# Patient Record
Sex: FEMALE | URBAN - METROPOLITAN AREA
[De-identification: names, ages, dates, MRNs, and addresses within clinical notes are randomized per-mention and may not be internally consistent; named-entity substitution may affect disease eponyms.]

---

## 2023-03-10 ENCOUNTER — TELEPHONE (OUTPATIENT)
Dept: ENDOCRINOLOGY CLINIC | Facility: CLINIC | Age: 33
End: 2023-03-10

## 2023-03-10 NOTE — TELEPHONE ENCOUNTER
Called pt to request the last two office visit notes from previous endocrinology office. Pt stated was seeing a Dr. Dayna Gould in [place] and has not been seen since November of 2022. Gave pt our fax number to have records sent to. Will await records and give to APRN for review.

## 2023-03-12 ENCOUNTER — PATIENT MESSAGE (OUTPATIENT)
Facility: CLINIC | Age: 33
End: 2023-03-12

## 2023-03-13 ENCOUNTER — OFFICE VISIT (OUTPATIENT)
Facility: CLINIC | Age: 33
End: 2023-03-13
Payer: COMMERCIAL

## 2023-03-13 VITALS — DIASTOLIC BLOOD PRESSURE: 64 MMHG | SYSTOLIC BLOOD PRESSURE: 110 MMHG | OXYGEN SATURATION: 99 % | HEART RATE: 96 BPM

## 2023-03-13 DIAGNOSIS — E10.9 TYPE 1 DIABETES MELLITUS WITHOUT COMPLICATION (HCC): Primary | ICD-10-CM

## 2023-03-13 PROBLEM — Z96.41 INSULIN PUMP IN PLACE: Status: ACTIVE | Noted: 2017-02-23

## 2023-03-13 LAB
CARTRIDGE LOT#: ABNORMAL NUMERIC
HEMOGLOBIN A1C: 6.4 % (ref 4.3–5.6)

## 2023-03-13 RX ORDER — INSULIN LISPRO 100 [IU]/ML
40 INJECTION, SOLUTION INTRAVENOUS; SUBCUTANEOUS DAILY
Qty: 40 ML | Refills: 2 | Status: SHIPPED | OUTPATIENT
Start: 2023-03-13

## 2023-03-13 RX ORDER — CIPROFLOXACIN HYDROCHLORIDE 3.5 MG/ML
2 SOLUTION/ DROPS TOPICAL 2 TIMES DAILY
COMMUNITY
End: 2023-03-13

## 2023-03-13 RX ORDER — BLOOD-GLUCOSE SENSOR
1 EACH MISCELLANEOUS
Qty: 9 EACH | Refills: 2 | Status: SHIPPED | OUTPATIENT
Start: 2023-03-13

## 2023-03-13 RX ORDER — BLOOD-GLUCOSE TRANSMITTER
1 EACH MISCELLANEOUS
Qty: 1 EACH | Refills: 3 | Status: SHIPPED | OUTPATIENT
Start: 2023-03-13

## 2023-03-13 RX ORDER — LANCETS
1 EACH MISCELLANEOUS
COMMUNITY

## 2023-03-13 RX ORDER — DESOGESTREL AND ETHINYL ESTRADIOL 21-5 (28)
1 KIT ORAL DAILY
COMMUNITY
End: 2023-03-13

## 2023-03-13 RX ORDER — BLOOD-GLUCOSE SENSOR
1 EACH MISCELLANEOUS
COMMUNITY
End: 2023-03-13

## 2023-03-13 RX ORDER — BLOOD-GLUCOSE TRANSMITTER
1 EACH MISCELLANEOUS
COMMUNITY
End: 2023-03-13

## 2023-03-13 RX ORDER — NORGESTIMATE AND ETHINYL ESTRADIOL 0.25-0.035
1 KIT ORAL DAILY
COMMUNITY
End: 2023-03-13

## 2023-03-13 RX ORDER — PERPHENAZINE 16 MG/1
1 TABLET, FILM COATED ORAL 4 TIMES DAILY
COMMUNITY

## 2023-03-13 RX ORDER — INSULIN LISPRO 100 [IU]/ML
50 INJECTION, SOLUTION INTRAVENOUS; SUBCUTANEOUS DAILY
COMMUNITY
End: 2023-03-13

## 2023-03-13 RX ORDER — INSULIN GLARGINE 100 [IU]/ML
20 INJECTION, SOLUTION SUBCUTANEOUS NIGHTLY
Qty: 15 ML | Refills: 0 | Status: SHIPPED | OUTPATIENT
Start: 2023-03-13

## 2023-03-23 ENCOUNTER — TELEPHONE (OUTPATIENT)
Facility: CLINIC | Age: 33
End: 2023-03-23

## 2023-03-23 NOTE — TELEPHONE ENCOUNTER
Received a call from [employer] from [employer]. Pts Tslim pump is out of warranty and pt due for a new replacement pump. [employer] will fax us the order form to complete.

## 2023-03-23 NOTE — TELEPHONE ENCOUNTER
Form received from [employer] statement of medical necessity. Form completed and signed by Ciaran Barriga. Faxed to [phone]. Fax confirmation received.

## 2023-03-27 NOTE — TELEPHONE ENCOUNTER
Call placed to [facility] [place] at [phone]. All documents have been received for pump replacement.  Statement of Medical Necessity sent to scan

## 2023-05-11 DIAGNOSIS — E10.9 TYPE 1 DIABETES MELLITUS WITHOUT COMPLICATION (HCC): ICD-10-CM

## 2023-05-11 RX ORDER — BLOOD SUGAR DIAGNOSTIC
1 STRIP MISCELLANEOUS 4 TIMES DAILY
COMMUNITY

## 2023-05-11 RX ORDER — LANCETS 33 GAUGE
1 EACH MISCELLANEOUS AS DIRECTED
COMMUNITY

## 2023-05-11 NOTE — TELEPHONE ENCOUNTER
Refills have been requested for the following medications, please advise and review:     Rx: INSULIN GLARGINE (LANTUS SOLOSTAR) 100 UNIT/ML SUBCUTANEOUS SOLUTION PEN-INJECTOR  Sig: Inject 20 units into the skin nightly.   Route: Subcutaneous  Quantity: 15 mL  Refills: 0     Preferred pharmacy: [facility] ([facility] - Karis Alcantara    Last Rx Refill: 3/13/2023  LOV: 3/13/2023  Next Scheduled Appt: 6/16/2023        Per LOV notes:   (E10.9) Type 1 diabetes mellitus without complication ([facility] [address]  (primary encounter diagnosis)  Plan: LIPID PANEL, MICROALB/CREAT RATIO, RANDOM         URINE, TSH W REFLEX TO FREE T4, COMP METABOLIC         PANEL (14), CBC, PLATELET; NO DIFFERENTIAL,         insulin glargine (LANTUS SOLOSTAR) 100 UNIT/ML         Subcutaneous Solution Pen-injector, Insulin         Lispro (HUMALOG) 100 UNIT/ML Injection         Solution, Continuous Blood Gluc Transmit         (DEXCOM G6 TRANSMITTER) Does not apply Misc,         Continuous Blood Gluc Sensor (DEXCOM G6 SENSOR)        Does not apply Misc, HEMOGLOBIN A1C, IRON AND         TIBC

## 2023-05-11 NOTE — TELEPHONE ENCOUNTER
Please call patient to verify this refill is needed- this is for Lantus (long acting insulin) and was intended as pump back up plan    1. Let patient know we received a request for Lantus refill  2. Ask if she has been using Lantus pens or using her pump  3. If she is using Lantus, how many units is she using? 4.  If not using and she is not aware, might have been autogenerated by the pharmacy and we can close it if she does not need it

## 2023-05-12 RX ORDER — INSULIN GLARGINE 100 [IU]/ML
INJECTION, SOLUTION SUBCUTANEOUS
Qty: 15 ML | Refills: 3 | OUTPATIENT
Start: 2023-05-12

## 2023-05-12 NOTE — TELEPHONE ENCOUNTER
RN called patient to ask if she needs a refill of Lantus pens. Patient stated she is currently on a pump and does not use the pens on a regular basis-only if having problems with pump. Patient said she has extra pens on hand and in her refrigerator, if that should occur. Refill request rejected. Routed to [facility]    Will close encounter.

## 2023-05-23 ENCOUNTER — LAB ENCOUNTER (OUTPATIENT)
Dept: LAB | Age: 33
End: 2023-05-23
Payer: COMMERCIAL

## 2023-05-23 DIAGNOSIS — E10.9 TYPE 1 DIABETES MELLITUS WITHOUT COMPLICATION (HCC): ICD-10-CM

## 2023-05-23 LAB
ALBUMIN SERPL-MCNC: 4 G/DL (ref 3.4–5)
ALBUMIN/GLOB SERPL: 1.1 {RATIO} (ref 1–2)
ALP LIVER SERPL-CCNC: 47 U/L
ALT SERPL-CCNC: 12 U/L
ANION GAP SERPL CALC-SCNC: 4 MMOL/L (ref 0–18)
AST SERPL-CCNC: 17 U/L (ref 15–37)
BILIRUB SERPL-MCNC: 0.4 MG/DL (ref 0.1–2)
BUN BLD-MCNC: 15 MG/DL (ref 7–18)
CALCIUM BLD-MCNC: 8.9 MG/DL (ref 8.5–10.1)
CHLORIDE SERPL-SCNC: 108 MMOL/L (ref 98–112)
CHOLEST SERPL-MCNC: 152 MG/DL (ref ?–200)
CO2 SERPL-SCNC: 25 MMOL/L (ref 21–32)
CREAT BLD-MCNC: 0.73 MG/DL
CREAT UR-SCNC: 65.5 MG/DL
ERYTHROCYTE [DISTWIDTH] IN BLOOD BY AUTOMATED COUNT: 15.6 %
FASTING PATIENT LIPID ANSWER: YES
FASTING STATUS PATIENT QL REPORTED: YES
GFR SERPLBLD BASED ON 1.73 SQ M-ARVRAT: 111 ML/MIN/1.73M2 (ref 60–?)
GLOBULIN PLAS-MCNC: 3.7 G/DL (ref 2.8–4.4)
GLUCOSE BLD-MCNC: 166 MG/DL (ref 70–99)
HCT VFR BLD AUTO: 35.3 %
HDLC SERPL-MCNC: 68 MG/DL (ref 40–59)
HGB BLD-MCNC: 10.8 G/DL
IRON SATN MFR SERPL: 10 %
IRON SERPL-MCNC: 36 UG/DL
LDLC SERPL CALC-MCNC: 74 MG/DL (ref ?–100)
MCH RBC QN AUTO: 22.5 PG (ref 26–34)
MCHC RBC AUTO-ENTMCNC: 30.6 G/DL (ref 31–37)
MCV RBC AUTO: 73.4 FL
MICROALBUMIN UR-MCNC: 1.09 MG/DL
MICROALBUMIN/CREAT 24H UR-RTO: 16.6 UG/MG (ref ?–30)
NONHDLC SERPL-MCNC: 84 MG/DL (ref ?–130)
OSMOLALITY SERPL CALC.SUM OF ELEC: 289 MOSM/KG (ref 275–295)
PLATELET # BLD AUTO: 247 10(3)UL (ref 150–450)
POTASSIUM SERPL-SCNC: 4.1 MMOL/L (ref 3.5–5.1)
PROT SERPL-MCNC: 7.7 G/DL (ref 6.4–8.2)
RBC # BLD AUTO: 4.81 X10(6)UL
SODIUM SERPL-SCNC: 137 MMOL/L (ref 136–145)
TIBC SERPL-MCNC: 371 UG/DL (ref 240–450)
TRANSFERRIN SERPL-MCNC: 249 MG/DL (ref 200–360)
TRIGL SERPL-MCNC: 43 MG/DL (ref 30–149)
TSI SER-ACNC: 3.06 MIU/ML (ref 0.36–3.74)
VLDLC SERPL CALC-MCNC: 7 MG/DL (ref 0–30)
WBC # BLD AUTO: 5.3 X10(3) UL (ref 4–11)

## 2023-05-23 PROCEDURE — 80061 LIPID PANEL: CPT

## 2023-05-23 PROCEDURE — 84443 ASSAY THYROID STIM HORMONE: CPT

## 2023-05-23 PROCEDURE — 82570 ASSAY OF URINE CREATININE: CPT

## 2023-05-23 PROCEDURE — 36415 COLL VENOUS BLD VENIPUNCTURE: CPT

## 2023-05-23 PROCEDURE — 85027 COMPLETE CBC AUTOMATED: CPT

## 2023-05-23 PROCEDURE — 83550 IRON BINDING TEST: CPT

## 2023-05-23 PROCEDURE — 3061F NEG MICROALBUMINURIA REV: CPT

## 2023-05-23 PROCEDURE — 83540 ASSAY OF IRON: CPT

## 2023-05-23 PROCEDURE — 80053 COMPREHEN METABOLIC PANEL: CPT

## 2023-05-23 PROCEDURE — 82043 UR ALBUMIN QUANTITATIVE: CPT

## 2023-05-24 ENCOUNTER — TELEPHONE (OUTPATIENT)
Facility: CLINIC | Age: 33
End: 2023-05-24

## 2023-05-24 NOTE — TELEPHONE ENCOUNTER
Called pt to go over lab results. Per Jacinta BRAGG: \"Please call pt with results:   -CBC shows anemia which appears to be iron deficiency anemia, she mentioned this previously to me during visit so I checked since was getting other annual labs. Would recommend follow up with primary care regarding this, typically we don't manage   -Urine test was checking for microalbuminuria, which isprotein spillage in the urine, can happen in patients with diabetes when their blood sugar or blood pressure is uncontrolled. Her test was NEGATIVE for protein spillage, which is a good result. We will check this annually to screen for kidney damage.   -Cholesterol panel looks good   -TFTs are normal, check annually \"  Provided pt with Jacinta notes and pt verbalized understanding and had no questions for staff at this time.

## 2023-06-16 ENCOUNTER — OFFICE VISIT (OUTPATIENT)
Facility: CLINIC | Age: 33
End: 2023-06-16
Payer: COMMERCIAL

## 2023-06-16 VITALS
DIASTOLIC BLOOD PRESSURE: 66 MMHG | OXYGEN SATURATION: 100 % | HEART RATE: 85 BPM | WEIGHT: 128 LBS | SYSTOLIC BLOOD PRESSURE: 116 MMHG

## 2023-06-16 DIAGNOSIS — D50.9 IRON DEFICIENCY ANEMIA, UNSPECIFIED IRON DEFICIENCY ANEMIA TYPE: ICD-10-CM

## 2023-06-16 DIAGNOSIS — E10.9 TYPE 1 DIABETES MELLITUS WITHOUT COMPLICATION (HCC): Primary | ICD-10-CM

## 2023-06-16 LAB
CARTRIDGE LOT#: ABNORMAL NUMERIC
HEMOGLOBIN A1C: 6.4 % (ref 4.3–5.6)

## 2023-06-16 PROCEDURE — 95251 CONT GLUC MNTR ANALYSIS I&R: CPT

## 2023-06-16 PROCEDURE — 3074F SYST BP LT 130 MM HG: CPT

## 2023-06-16 PROCEDURE — 99215 OFFICE O/P EST HI 40 MIN: CPT

## 2023-06-16 PROCEDURE — 3044F HG A1C LEVEL LT 7.0%: CPT

## 2023-06-16 PROCEDURE — 83036 HEMOGLOBIN GLYCOSYLATED A1C: CPT

## 2023-06-16 PROCEDURE — 3078F DIAST BP <80 MM HG: CPT

## 2023-06-16 RX ORDER — BUSPIRONE HYDROCHLORIDE 5 MG/1
10 TABLET ORAL DAILY
COMMUNITY
Start: 2023-05-12

## 2023-06-16 NOTE — PATIENT INSTRUCTIONS
Eye exam: Please have your diabetic eye exam if you have not had one this year. This exam is critical to preventing and treating eye conditions caused by diabetes that could potentially lead to vision loss. Once you get your eye exam done, or if you've already had one done this year, please call your eye care provider and ask them to fax your latest report to our office. This will help us update our records. Fax number: [phone]     Plan:  - Follow up with primary care/OB regarding iron deficiency anemia  - minor pump setting adjustments made today, if noting still running a bit higher after meals I'd move your carb ratio from 15 --> 14 and see if that helps.  You can also make some more minor adjustments to your sensitivity- if 68 is still not enough, move to 65, that may be your sweet spot  - let's see you back in 3 months to make sure changes all look good :)       Pump back up plan:  Lantus 20u daily  Humalog carb ratio: take 1u for every 15 grams of carbs,   Sensitivity: take 1 u for every 68 points over 100

## 2023-09-15 ENCOUNTER — OFFICE VISIT (OUTPATIENT)
Facility: CLINIC | Age: 33
End: 2023-09-15
Payer: COMMERCIAL

## 2023-09-15 VITALS — DIASTOLIC BLOOD PRESSURE: 60 MMHG | SYSTOLIC BLOOD PRESSURE: 110 MMHG | OXYGEN SATURATION: 100 % | HEART RATE: 93 BPM

## 2023-09-15 DIAGNOSIS — E10.9 TYPE 1 DIABETES MELLITUS WITHOUT COMPLICATION (HCC): Primary | ICD-10-CM

## 2023-09-15 PROBLEM — E10.319 DIABETIC RETINOPATHY ASSOCIATED WITH TYPE 1 DIABETES MELLITUS (HCC): Status: ACTIVE | Noted: 2023-09-15

## 2023-09-15 LAB
CARTRIDGE LOT#: ABNORMAL NUMERIC
HEMOGLOBIN A1C: 6.2 % (ref 4.3–5.6)

## 2023-09-15 PROCEDURE — 3044F HG A1C LEVEL LT 7.0%: CPT

## 2023-09-15 PROCEDURE — 95251 CONT GLUC MNTR ANALYSIS I&R: CPT

## 2023-09-15 PROCEDURE — 3074F SYST BP LT 130 MM HG: CPT

## 2023-09-15 PROCEDURE — 83036 HEMOGLOBIN GLYCOSYLATED A1C: CPT

## 2023-09-15 PROCEDURE — 99214 OFFICE O/P EST MOD 30 MIN: CPT

## 2023-09-15 PROCEDURE — 3078F DIAST BP <80 MM HG: CPT

## 2023-09-15 RX ORDER — INSULIN GLARGINE 100 [IU]/ML
17 INJECTION, SOLUTION SUBCUTANEOUS NIGHTLY
COMMUNITY
Start: 2023-09-15

## 2023-09-15 RX ORDER — PEN NEEDLE, DIABETIC 32GX 5/32"
1 NEEDLE, DISPOSABLE MISCELLANEOUS DAILY
COMMUNITY
Start: 2023-09-15

## 2023-10-22 DIAGNOSIS — E10.9 TYPE 1 DIABETES MELLITUS WITHOUT COMPLICATION (HCC): ICD-10-CM

## 2023-10-23 RX ORDER — PROCHLORPERAZINE 25 MG/1
1 SUPPOSITORY RECTAL
Qty: 9 EACH | Refills: 1 | Status: SHIPPED | OUTPATIENT
Start: 2023-10-23

## 2023-10-23 NOTE — TELEPHONE ENCOUNTER
LOV: 9/15/23    RTC: 3 months - around 12/15/23    FU: scheduled 12/15/23    Last Refill: 3/13/23    Month Supply Pending: 3 months

## 2023-11-09 DIAGNOSIS — E10.9 TYPE 1 DIABETES MELLITUS WITHOUT COMPLICATION (HCC): ICD-10-CM

## 2023-11-09 RX ORDER — INSULIN LISPRO 100 [IU]/ML
INJECTION, SOLUTION INTRAVENOUS; SUBCUTANEOUS
Qty: 60 ML | Refills: 1 | Status: SHIPPED | OUTPATIENT
Start: 2023-11-09

## 2023-11-09 NOTE — TELEPHONE ENCOUNTER
LOV: 9/15/23    RTC: 3 months (around 12/15/23)    FU: scheduled 12/15/23    Last Refill: 3/13/23    Month Supply Pending: 3 months     Per OV notes from 9/15/23:    \"Tandem TSlim pump- Basal IQ- Using Humalog U100 (fills with ~120u q3day)  MN: 0.60 u/hr   630a:  0.90 u/hr   8p: 0.90 u/hr   Calculated Total daily basal: 19.70u  ICR:  14 --> 15  ISF:  68 --> 65  Target \"

## 2023-11-28 LAB
ANTIBODY SCREEN OB: NEGATIVE
HEPATITIS B SURFACE ANTIGEN OB: NEGATIVE
HIV RESULT OB: NEGATIVE
RAPID PLASMA REAGIN OB: NONREACTIVE

## 2023-11-29 ENCOUNTER — OFFICE VISIT (OUTPATIENT)
Facility: CLINIC | Age: 33
End: 2023-11-29
Payer: COMMERCIAL

## 2023-11-29 VITALS — OXYGEN SATURATION: 100 % | HEART RATE: 104 BPM | DIASTOLIC BLOOD PRESSURE: 60 MMHG | SYSTOLIC BLOOD PRESSURE: 92 MMHG

## 2023-11-29 DIAGNOSIS — O24.011 TYPE 1 DIABETES MELLITUS DURING PREGNANCY IN FIRST TRIMESTER: Primary | ICD-10-CM

## 2023-11-29 PROCEDURE — 99214 OFFICE O/P EST MOD 30 MIN: CPT

## 2023-11-29 PROCEDURE — 3074F SYST BP LT 130 MM HG: CPT

## 2023-11-29 PROCEDURE — 3078F DIAST BP <80 MM HG: CPT

## 2023-11-29 PROCEDURE — 95251 CONT GLUC MNTR ANALYSIS I&R: CPT

## 2023-11-29 RX ORDER — GLUCAGON INJECTION, SOLUTION 1 MG/.2ML
1 INJECTION, SOLUTION SUBCUTANEOUS AS NEEDED
Qty: 0.4 ML | Refills: 1 | Status: SHIPPED | OUTPATIENT
Start: 2023-11-29

## 2023-11-29 RX ORDER — PERPHENAZINE 16 MG/1
1 TABLET, FILM COATED ORAL 4 TIMES DAILY
Qty: 200 EACH | Refills: 1 | Status: SHIPPED | OUTPATIENT
Start: 2023-11-29

## 2023-11-29 NOTE — PROGRESS NOTES
Endocrinology Clinic Note    Name: June Grossman  Date: 11/29/2023      HISTORY OF PRESENT ILLNESS   June Grossman is a 26year old female with PMHx significant for type 1 diabetes mellitus who presents for T1DM management. Initial HPI consult in March 2023:   A1C 6.4%  Diagnosed age: 7y/o  Pt denies hx of hospitalization for DM and/or pancreatitis. Family history of DM: None  Only has had DKA 1x possibly- unsure  Establishing care to be closer to home, prev pt of Dr Sanchez Rebolledo in [facility]  Using Tandem pump in [place], is due for pump upgrade but needed updated visit note    Interim hx  June 2023- A1C 6.4% today. Upgraded to control IQ pump in April 2023, liking it so far! Did have to change her settings from [address], we changed from 100-->55 and was constantly snacking w/ this so incr'd back up to middle ground of 75. She does note that once she hits >200 her insulin resistance seems worse, otherwise very insulin sensitive. No pattern to hypo events, occurring very rarely. Sept 2023- Last A1c value was 6.2% done 9/15/2023. Current DM meds: Tandem pump settings (below)   Doing well overall, liking the pump changes we made. Is often manually entering carbs instead of using carb ratio; very busy w/ work and kids  Starting taking iron supplements, has more energy now.  Also recently dx'd with ADHD, tried anxiety meds but wasn't a good fit    Nov 2023-  Newly pregnant, LMP 10/4/23, approx 8 wks pregnant, saw OB yesterday ([address] [place] [address])  Has noticed higher BG readings the past few weeks, using more insulin  Previous pregnancy used Control IQ, prior to that was on medtronic pump      Tandem Tslim with Control IQ - Humalog U100 insulin and Dexcom G6  Average daily insulin summary:  TDD 39.36  Basal: 46% - 18.24u/day  Bolus: 54% - 21.13u/day  Basal setting:  MN: 0.60 u/hr  6:30a 0.90 u/hr  8p: 0.90 u/hr  Bolus setting:   ICR: 15  ISF: 65  Active insulin time: 5h, target 110      Continuous Glucose Monitoring Interpretation    Ronan Blanco has undergone continuous glucose monitoring with the Dexcom CGM. The blood glucose tracings were evaluated for two weeks prior to office visit. Blood glucose tracings demonstrated areas of hyperglycemia from 11a to 4p. There were no areas of hypoglycemia during the weeks of evaluation. At goal () 77% of the time. High 20% of the time. Very high 3% of the time. Low 0% of the time. Very low 0% of the time. Previously trialed/failed: prev on medtronic pump    REVIEW OF SYSTEMS  Ten point review of systems has been performed and is otherwise negative and/or non-contributory, except as described above. Medications:     Current Outpatient Medications:     Glucose Blood (CONTOUR NEXT TEST) In Vitro Strip, 1 each by Other route in the morning, at noon, in the evening, and at bedtime. , Disp: 200 each, Rfl: 1    glucagon (GVOKE HYPOPEN 2-PACK) 1 MG/0.2ML Subcutaneous SUBQ injection, Inject 0.2 mL (1 mg total) into the skin as needed. Patient trained on [address] No substitutions, Disp: 0.4 mL, Rfl: 1    insulin lispro (HUMALOG) 100 UNIT/ML Injection Solution, Used to fill insulin pump with max 60 u daily. 3-month supply, Disp: 60 mL, Rfl: 1    Continuous Blood Gluc Sensor (DEXCOM G6 SENSOR) Does not apply Misc, CHANGE 1 SENSOR EVERY 10 DAYS., Disp: 9 each, Rfl: 1    Insulin Pen Needle (BD PEN NEEDLE CARMEN U/F) 32G X 4 MM Does not apply Misc, Inject 1 Pen into the skin daily. Use as insulin pump back up, Disp: , Rfl:     insulin glargine (LANTUS SOLOSTAR) 100 UNIT/ML Subcutaneous Solution Pen-injector, Inject 17 Units into the skin nightly., Disp: , Rfl:     busPIRone 5 MG Oral Tab, Take 2 tablets (10 mg total) by mouth daily. , Disp: , Rfl:     OneTouch Delica Lancets 11Y Does not apply Misc, 1 Lancet by Finger stick route As Directed., Disp: , Rfl:     Glucose Blood (ONETOUCH VERIO) In Vitro Strip, 1 each by Other route 4 (four) times daily. , Disp: , Rfl:     Microlet Lancets Does not apply Misc, 1 each by Other route TID & HS., Disp: , Rfl:     Continuous Blood Gluc Transmit (DEXCOM G6 TRANSMITTER) Does not apply Misc, 1 each by Other route every 3 (three) months., Disp: 1 each, Rfl: 3     Allergies:   No Known Allergies    Social History:   Social History     Socioeconomic History    Marital status:    Tobacco Use    Smoking status: Never    Smokeless tobacco: Never   Substance and Sexual Activity    Alcohol use: Yes     Alcohol/week: 1.0 standard drink of alcohol     Types: 1 Glasses of wine per week     Comment: 1 per week occasionally    Drug use: Never    Sexual activity: Yes       Medical History:   Past Medical History:   Diagnosis Date    Insulin pump in place     Type 1 diabetes mellitus ([facility] [address]        Surgical history:   Past Surgical History:   Procedure Laterality Date      SECTION LEVEL I  2020      SECTION LEVEL I  2017    WISDOM TEETH REMOVED           PHYSICAL EXAM  Vitals:    23 0912   BP: 92/60   Pulse: 104   SpO2: 100%       General Appearance:  alert, well developed, in no acute distress  Eyes:  normal conjunctivae, sclera  Musculoskeletal:  normal muscle strength and tone  Skin:  normal moisture and skin texture  Hair & Nails:  normal scalp hair     Neuro:  sensory grossly intact and motor grossly intact  Psychiatric:  oriented to time, self, and place      ASSESSMENT/PLAN:    (E10.9) Type 1 diabetes mellitus without complication ([facility] [address]  (primary encounter diagnosis)    Pre pregnancy A1C 6.2%,to goal <6.5%. We discussed options of remaining in control IQ vs manual mode on pump. Control IQ will keep pt closer to 110 vs manual closer to guidelines recommended FBG <95. Pt prefers to trial incr'd settings on Control IQ to eval if BG improve as she is quite sensitive to low Bgs, fearful of hypo events overnight, which control IQ mitigates.  Will trial Control IQ with adjusted settings and she is aware of risks associated with higher BG readings above target. If not meeting goals, will plan to trial manual mode settings     We discussed the importance of glycemic control to prevent complications of diabetes. We discussed the importance of SBGM and consistent, low carb diet as well as moderate exercise as well. We also discussed the complications of diabetes include retinopathy, neuropathy, nephropathy and cardiovascular disease. Tandem TSlim pump- Basal IQ- Using Humalog U100 (fills with ~130u q3day)    Basal setting:  MN: 0.60 u/hr --> 0.80 u/hr  Bolus setting:   ICR: 15 --> 12  ISF: 65 --> 55  Active insulin time: 5h, target 110    - got labs done with OB office, if did not complete TFTs to let office know and I will order  - Goal is to get as close to 6% as possible without causing hypoglycemia. Fasting blood sugar goal <95. 1 hour after meal <140. 2 hours after meal <120. A1C goal 6-6.5%  - plan for every Monday BG check ins while pregnant  - gvoke sent (11/2023)  - pump backup plan: Lantus 17u nightly, Humalog ICR 1:15, ISF 1:65>100 (updated 9/2023)  - Ophtho: no DR, No data recorded Eye Exam shows Diabetic Retinopathy?: No  - BP controlled, on not on antihypertensives  - LDL at goal, defer statin, pregnant  - MAB neg  - Neuropathy/ Foot exam: Last Foot Exam: 03/13/23  - CAD: none       Return in about 1 month (around 12/29/2023) for evaluation of insulin adjustments, follow up on pump settings.       11/29/2023  YEMI Head

## 2023-12-04 ENCOUNTER — TELEPHONE (OUTPATIENT)
Facility: CLINIC | Age: 33
End: 2023-12-04

## 2023-12-04 NOTE — TELEPHONE ENCOUNTER
Printed pts Tandem report for weekly review. Placed in APN in basket.      Per LOV 11/29:  Tandem TSlim pump- Basal IQ- Using Humalog U100 (fills with ~130u q3day)     Basal setting:  MN: 0.60 u/hr --> 0.80 u/hr  Bolus setting:   ICR: 15 --> 12  ISF: 65 --> 55  Active insulin time: 5h, target 110

## 2023-12-04 NOTE — TELEPHONE ENCOUNTER
Patient self adjusted basal rates to:    MN --> 0.8  8 pm --> 1 u (added yesterday)    Patient was noticing high BG's in the evening since stopping control IQ    Patient adjusted ICR + ISF, will check with provider about basal rates    Nidhi Denny RN   DCES

## 2023-12-04 NOTE — TELEPHONE ENCOUNTER
Monday pregnancy check in- 12/4/2023  Unknown today. Patient currently taking:  Basal setting:  MN: 0.80 u/hr  Bolus setting:   ICR: 12  ISF: 55  Active insulin time: 5h, target 110    CGM and Tandem T-slim data reviewed. Provider assessment:  no post prandial hyperglycemia noted. Having lows after meal boluses when she enters carbs, but still is overriding quite a bit  Overnight/basal pattern stable. Occasional near hypo. Did not adjust settings per message last week  There were no areas of hypoglycemia during the weeks of evaluation. and There was notable hypoglycemia over the last week. Plan:  Make the following med changes:    Basal setting:  MN: 0.60 u/hr   0530: 0.65 u/hr  8A: 0.7 u/hr  10P (or one hour before bedtime): 0.60 u/hr  Bolus setting:   ICR: 12 --> 14  ISF: 55---> 56  Active insulin time: 5h, target 110       Routed to DM educator to communicate plan to patient.

## 2023-12-04 NOTE — TELEPHONE ENCOUNTER
Leander Ya to communicate to patient:  Let's try this for basal rates:  MN: 0.70u/hr  7a: 0.80 u/hr  7P: 0.90u/hr  10P: 0.70u/hr    ICR 12-->13  ISF 56    Try to be more consistent with entering carbs using carb bolus feature as this will really help prevent these labile readings and help us titrate as needed    Will get on Nova's schedule for carb count refresh

## 2023-12-05 ENCOUNTER — MED REC SCAN ONLY (OUTPATIENT)
Facility: CLINIC | Age: 33
End: 2023-12-05

## 2023-12-11 ENCOUNTER — TELEPHONE (OUTPATIENT)
Facility: CLINIC | Age: 33
End: 2023-12-11

## 2023-12-11 NOTE — TELEPHONE ENCOUNTER
Printed patients tandem report for weekly review. Placed in APN inbasket.      Per last weekly check in 12/4:   Plan:  Make the following med changes:     Basal setting:  MN: 0.60 u/hr   0530: 0.65 u/hr  8A: 0.7 u/hr  10P (or one hour before bedtime): 0.60 u/hr  Bolus setting:   ICR: 12 --> 14  ISF: 55---> 56  Active insulin time: 5h, target 110

## 2023-12-11 NOTE — TELEPHONE ENCOUNTER
Monday pregnancy check in- 12/11/2023    Patient currently taking:  Basal setting:  MN: 0.60 u/hr   0630: 0.80 u/hr  1P: 1.20 u/hr  3P: 0.80  6P: 1.20  10P: 0.80 u/hr  Bolus setting:   ICR: 14  ISF: 56  TDD 40.93u    Dexcom G6 CGM Tandem T-slim data reviewed. Provider assessment:  mild post prandial hyperglycemia noted. Overnight/basal pattern stable. FBG not to goal  There were several areas of hypoglycemia. These occurred after pump override doses    Plan:  Make the following med changes:  Basal setting:  MN: 0.60 u/hr --> 0.70  0630: 0.80 u/hr --> 1.0 u/hr  1P: 1.20 u/hr--> 1.0 u/hr  3P: 0.80--> 1.0 u/hr  6P: 1.20--> 1.0 u/hr (let's try making these profiles the same thruout the day to see if helps with consistency- hard to note patterns)  10P: 0.80 u/hr  Bolus setting:   ICR: 14 --> 13  ISF: 56    Routed to DM educator to communicate plan to patient.

## 2023-12-18 ENCOUNTER — TELEPHONE (OUTPATIENT)
Facility: CLINIC | Age: 33
End: 2023-12-18

## 2023-12-18 NOTE — TELEPHONE ENCOUNTER
Monday pregnancy check in- 12/18/2023 - Unknown today. Patient currently taking:  TDD 36u     MN:  basal 0.55  ICR 1:13  ISF 56  target 110  6:30 am: basal 1.0 u  ICR 1:13  ISF 56  target 110  10 pm: basal 0.55 u   ICR 1:13  ISF 56  target 110     Tandem T-slim and with phone (connected to clinic profile) data reviewed. Provider assessment:  FBG not to goal, 2h PP to goal, and Hypoglycemia present  Having some overnight hypo/hyperglycemia (no consistent pattern); sometimes going low after meal boluses. Is over basalized currently. Plan:  Make the following med changes:    MN:  basal 0.55   ICR 1:13   --> 14; ISF 56  target 110  6:30 am: basal 1.0 u --> 0.95 u/hr  ICR 1:13 --> 14; ISF 56  target 110  10 pm: basal 0.70 --> 0.65 u/hr   ICR 1:13  --> 14 ; ISF 56  target 110   I am decreasing the settings ever so slightly; now if she is running higher, should give corrections; and if she feels the corrections aren't bringing her down,  we can slowly adjust her correction factor down (which is quite conservative right now compared to rest of settings)    Routed to DM educator to communicate plan to patient.

## 2023-12-18 NOTE — TELEPHONE ENCOUNTER
Downloaded pts Tandem report into [employer] for APN review.      Per last weekly check in 12/11:   Update settings to this:     MN:  basal 0.55  ICR 1:13  ISF 56  target 110  6:30 am: basal 1.0 u  ICR 1:13  ISF 56  target 110  10 pm: basal 0.55 u   ICR 1:13  ISF 56  target 110

## 2023-12-26 ENCOUNTER — PATIENT MESSAGE (OUTPATIENT)
Facility: CLINIC | Age: 33
End: 2023-12-26

## 2023-12-26 ENCOUNTER — TELEPHONE (OUTPATIENT)
Facility: CLINIC | Age: 33
End: 2023-12-26

## 2023-12-27 NOTE — TELEPHONE ENCOUNTER
Reviewed Tandem source    I suspected if we reduced basal rate, would need to incr ICR 14 --> 12    Also c/b pt's current illness.  [facility] sent with instructions

## 2023-12-29 NOTE — TELEPHONE ENCOUNTER
RN phoned patient to no answer, LVM asking patient to review [facility] from [address] [place] [address] & [facility] and then let us know how she is doing on 1/2 when we are back in office.

## 2024-01-02 ENCOUNTER — TELEPHONE (OUTPATIENT)
Facility: CLINIC | Age: 34
End: 2024-01-02

## 2024-01-02 NOTE — TELEPHONE ENCOUNTER
LVM for patient to call back when available to discuss further and schedule appointment with DCES.  Follow up MCM sent as well    Rohini BETANCUR

## 2024-01-02 NOTE — TELEPHONE ENCOUNTER
Monday pregnancy check in- 1/2/2024     Tandem T-slim and with phone (connected to clinic profile) data reviewed.     Provider assessment:  There are some evenings where FBG look stable. She is frequently overriding her pump so the calculations/changes we are making aren't considered in calculation. Her 2hPP appears not to goal, but looks like at some point in last few days she adjusted ICR 1:14 --> 10. Would benefit from ISF adjustment however would not see benefit unless she uses ISF calculator consistently. Needs to meet with CDE.    ISF remains 1:56, using calculations could be ~1:50     Plan:  Make the following med changes:  MN:  basal 0.50   ICR 1:10; ISF 56 --> 52  target 110  6:30 am: basal 0.80  ICR 1:10; ISF 56--> 52   target 110  10 pm: basal  0.65 u/hr   ICR 1:10 ; ISF 56 --> 52  target 110     Encourage pt to avoid pump overrides otherwise we won't have clear pictures of insulin dosages  Plan to meet with DM educator just to review bolus habits    Routed to DM educator to communicate plan to patient.

## 2024-01-02 NOTE — TELEPHONE ENCOUNTER
Uploaded pts [employer]connect report into [email or web address] for weekly check in. Routing to APN for review.     Per last weekly check in 12/26:     Plan:  Make the following med changes:  Suspect some over-basalization during the day. Phoned pt to discuss. She had to cancel her appt tomorrow because she's sick. Will adjust basal rates; MCM sent. If still high later this week, then can adjust ISF, advised to try avoiding overriding the pump using manual boluses as this seems to contribute to low BG as well        MN:  basal 0.50   ICR 1:14; ISF 56  target 110  6:30 am: basal 0.95 --> 0.70 u/hr  ICR 1:14; ISF 56  target 110  10 pm: basal  0.65 u/hr   ICR 1:14 ; ISF 56  target 110

## 2024-01-08 ENCOUNTER — TELEPHONE (OUTPATIENT)
Facility: CLINIC | Age: 34
End: 2024-01-08

## 2024-01-08 NOTE — TELEPHONE ENCOUNTER
Uploaded pt Tandem report into [employer] for weekly review. Routing to APN.     Per last weekly check in 1/2:  Plan:  Make the following med changes:  MN:  basal 0.50   ICR 1:10; ISF 56 --> 52  target 110  6:30 am: basal 0.80  ICR 1:10; ISF 56--> 52   target 110  10 pm: basal  0.65 u/hr   ICR 1:10 ; ISF 56 --> 52  target 110      Encourage pt to avoid pump overrides otherwise we won't have clear pictures of insulin dosages  Plan to meet with DM educator just to review bolus habits

## 2024-01-08 NOTE — TELEPHONE ENCOUNTER
LVM and sent MCM with the following update per APN:     MN:  basal 0.50   ICR 1:10; ISF  52 --> 48  target 110  6:30 am: basal 0.80  ICR 1:10; ISF  52 --> 48   target 110  10 pm: basal  0.65 u/hr   ICR 1:10 ; ISF  52 --> 48  target 110   Patient reports that he used to smoke 1 5 PPD  Has now cut back to smoking 3 cigarettes a day  Plan:  Continue with nicotine patch  Continue encouraging smoking cessation

## 2024-01-08 NOTE — TELEPHONE ENCOUNTER
Monday pregnancy check in- 1/8/2024 - Unknown today.     Patient currently taking:  MN:  basal 0.50   ICR 1:10; ISF  52  target 110  6:30 am: basal 0.80  ICR 1:10; ISF  52   target 110  10 pm: basal  0.65 u/hr   ICR 1:10 ; ISF  52  target 110    Tandem T-slim and with phone (connected to clinic profile) data reviewed.     Provider assessment:  FBG to goal/stable, 2hPP not to goal, and Hypoglycemia present    Plan:  ISF still more conservative which is why she may feel the need to override the pump after a correction given.  Plan to adjust ISF    Make the following med changes:    MN:  basal 0.50   ICR 1:10; ISF  52 --> 48  target 110  6:30 am: basal 0.80  ICR 1:10; ISF  52 --> 48   target 110  10 pm: basal  0.65 u/hr   ICR 1:10 ; ISF  52 --> 48  target 110    Routed to DM educator to communicate plan to patient.

## 2024-01-15 ENCOUNTER — TELEPHONE (OUTPATIENT)
Facility: CLINIC | Age: 34
End: 2024-01-15

## 2024-01-15 NOTE — TELEPHONE ENCOUNTER
Placed call to patient and gave direction per APN to try and avoid overriding boluses.  Patient v/u and thanks writer for calling.    Rohini BETANCUR   DCES

## 2024-01-15 NOTE — TELEPHONE ENCOUNTER
Uploaded pts Tandem report into [employer] for weekly review. Routing to APN.     Per last weekly check in 1/8:  Plan:  ISF still more conservative which is why she may feel the need to override the pump after a correction given.  Plan to adjust ISF     Make the following med changes:     MN:  basal 0.50   ICR 1:10; ISF  52 --> 48  target 110  6:30 am: basal 0.80  ICR 1:10; ISF  52 --> 48   target 110  10 pm: basal  0.65 u/hr   ICR 1:10 ; ISF  52 --> 48  target 110

## 2024-01-15 NOTE — TELEPHONE ENCOUNTER
Monday pregnancy check in- 1/15/2024 - Unknown today.     Patient currently taking:  MN:  basal 0.50u/hr    ICR 1:10; ISF 48  target 110  6:30 am: basal 0.80u/hr  ICR 1:10; ISF 48   target 110  10 pm: basal  0.65 u/hr   ICR 1:10 ; ISF  48  target 110    Tandem T-slim and with phone (connected to clinic profile) data reviewed.   Avg TDD 35u    Provider assessment:  FBG to goal/stable, 2hPP not to goal, and Hypoglycemia present    Plan:  Overall BG appear more stable this week. Occasional hypo episodes occur after stacked insulin, particularly after pt gives manual corrections  No medication changes today, continue current plan.   Advise pt to continue using pump programmed- recommendations as if she overrides for corrections, my settings adjustments won't matter too much. Gave some screenshots below as examples for pt - non manual boluses bring her down nicely w/o lows      Routed to DM educator to communicate plan to patient.

## 2024-01-22 ENCOUNTER — PATIENT MESSAGE (OUTPATIENT)
Facility: CLINIC | Age: 34
End: 2024-01-22

## 2024-01-22 ENCOUNTER — TELEPHONE (OUTPATIENT)
Facility: CLINIC | Age: 34
End: 2024-01-22

## 2024-01-22 NOTE — TELEPHONE ENCOUNTER
Monday pregnancy check in- 1/22/2024 - Unknown today.     Patient currently taking:  MN:  basal 0.50u/hr    ICR 1:10; ISF 48  target 110  6:30 am: basal 0.80u/hr  ICR 1:10; ISF 48   target 110  10 pm: basal  0.60 u/hr   ICR 1:10 ; ISF  48  target 110      Tandem T-slim and with phone (connected to clinic profile) data reviewed.     Provider assessment:  FBG not to goal, 2hPP not to goal, and No hypoglycemia noted    Plan:  No changes today- pt needs appt to discuss BG control - generally stable  MCM sent to pt requesting to call and schedule appt.    MN:  basal 0.50u/hr    ICR 1:10; ISF 48  target 110  6:30 am: basal 0.80u/hr  ICR 1:10; ISF 48   target 110  10 pm: basal  0.60 u/hr   ICR 1:10 ; ISF  48  target 110

## 2024-01-23 NOTE — TELEPHONE ENCOUNTER
From: Beatriz Aguilar  To: Tristin Gonzalez  Sent: 1/22/2024 11:04 AM CST  Subject: Settings    Hi Tristin--    I reviewed your pump report. Things overall look stable but I see you're often riding low overnight.    I like to see you monthly for visits while pregnant- please schedule an in person or phone visit this week or next so we can check in how it's going and from there I can more more adjustments -- I don't want to keep adjusting without chatting about what's working well/what you feel isn't working.    Office phone number: [phone]    Take care! YEMI Ortiz

## 2024-01-23 NOTE — TELEPHONE ENCOUNTER
MN:  basal 0.50u/hr    ICR 1:10; ISF 48  target 110  6:30 am: basal 0.80u/hr  ICR 1:10; ISF 48   target 110  10 pm: basal  0.60 u/hr   ICR 1:10 ; ISF  48  target 110    Changed ISF 48 --> 55, asked to schedule f/u appt. Closing encounter

## 2024-01-29 ENCOUNTER — TELEPHONE (OUTPATIENT)
Facility: CLINIC | Age: 34
End: 2024-01-29

## 2024-01-29 NOTE — TELEPHONE ENCOUNTER
Monday pregnancy check in- 1/29/2024 - Unknown today.     Patient currently taking:  MN:  basal 0.50u/hr    ICR 1:10; ISF 48  target 110  6:30 am: basal 0.80u/hr  ICR 1:10; ISF 48   target 110  10 pm: basal  0.60 u/hr   ICR 1:10 ; ISF  48  target 110     Changed ISF 48 --> 55, asked to schedule f/u appt. Closing encounter           Tandem T-slim and with phone (connected to clinic profile) data reviewed.     Provider assessment:  FBG not to goal, 2hPP not to goal, and No hypoglycemia noted    Plan:  Make the following med changes:  Please arrange for pt to meet with CDE prior to her appt with me  on 1/31 (if possible) to discuss bolusing strategies and carb intake during pregnancy  Agree w/ the changes she made to ISF/ICR over the weekend. Let's adjust MN basal as she's climbing higher at that time:    MN:  basal 0.50u/hr  --> 0.55u/hr  ICR 1:9; ISF 48  target 110  6:30 am: basal 0.80u/hr  ICR 1:9; ISF 48   target 110  10 pm: basal  0.60 u/hr   ICR 1:9 ; ISF  48  target 110        Routed to DM educator to communicate plan to patient.

## 2024-01-29 NOTE — TELEPHONE ENCOUNTER
Uploaded pt tandem report into [employer]. Routing to provider for review.     Pt sent mcm will the following comments:   Hi Beatriz, Happy Monday!      So, the past 2 days my blood sugars have been out of control. I think I’ve hit that next stage in pregnancy where my insulin resistance is going up. I increased the basal slightly, and also the carb ratio from 10 to 9. This may need to be adjusted further.      I’m Calling today to make an appointment!      Thank you,  Tristin Gonzalez     Per TE 1/22 last pump changes made:   Changed ISF 48 --> 55, asked to schedule f/u appt.

## 2024-01-31 ENCOUNTER — LAB ENCOUNTER (OUTPATIENT)
Dept: LAB | Age: 34
End: 2024-01-31
Payer: COMMERCIAL

## 2024-01-31 ENCOUNTER — OFFICE VISIT (OUTPATIENT)
Facility: CLINIC | Age: 34
End: 2024-01-31
Payer: COMMERCIAL

## 2024-01-31 VITALS — SYSTOLIC BLOOD PRESSURE: 110 MMHG | OXYGEN SATURATION: 98 % | DIASTOLIC BLOOD PRESSURE: 64 MMHG | HEART RATE: 101 BPM

## 2024-01-31 DIAGNOSIS — O24.012 TYPE 1 DIABETES MELLITUS DURING PREGNANCY IN SECOND TRIMESTER: Primary | ICD-10-CM

## 2024-01-31 DIAGNOSIS — O24.012 TYPE 1 DIABETES MELLITUS DURING PREGNANCY IN SECOND TRIMESTER: ICD-10-CM

## 2024-01-31 LAB
EST. AVERAGE GLUCOSE BLD GHB EST-MCNC: 117 MG/DL (ref 68–126)
HBA1C MFR BLD: 5.7 % (ref ?–5.7)
T4 FREE SERPL-MCNC: 1.2 NG/DL (ref 0.8–1.7)
THYROGLOB SERPL-MCNC: <15 U/ML (ref ?–60)
THYROPEROXIDASE AB SERPL-ACNC: 44 U/ML (ref ?–60)
TSI SER-ACNC: 3.05 MIU/ML (ref 0.36–3.74)

## 2024-01-31 PROCEDURE — 84439 ASSAY OF FREE THYROXINE: CPT

## 2024-01-31 PROCEDURE — 86376 MICROSOMAL ANTIBODY EACH: CPT

## 2024-01-31 PROCEDURE — 36415 COLL VENOUS BLD VENIPUNCTURE: CPT

## 2024-01-31 PROCEDURE — 86800 THYROGLOBULIN ANTIBODY: CPT

## 2024-01-31 PROCEDURE — 3078F DIAST BP <80 MM HG: CPT

## 2024-01-31 PROCEDURE — 3044F HG A1C LEVEL LT 7.0%: CPT

## 2024-01-31 PROCEDURE — 84443 ASSAY THYROID STIM HORMONE: CPT

## 2024-01-31 PROCEDURE — 95251 CONT GLUC MNTR ANALYSIS I&R: CPT

## 2024-01-31 PROCEDURE — 3074F SYST BP LT 130 MM HG: CPT

## 2024-01-31 PROCEDURE — 99214 OFFICE O/P EST MOD 30 MIN: CPT

## 2024-01-31 PROCEDURE — 83036 HEMOGLOBIN GLYCOSYLATED A1C: CPT

## 2024-01-31 RX ORDER — ACYCLOVIR 400 MG/1
1 TABLET ORAL
Qty: 9 EACH | Refills: 1 | Status: SHIPPED | OUTPATIENT
Start: 2024-01-31

## 2024-01-31 NOTE — PROGRESS NOTES
Endocrinology Clinic Note    Name: Tristin Gonzalez  Date: 1/31/2024      HISTORY OF PRESENT ILLNESS   Tristin Gonzalez is a 33 year old female with PMHx significant for type 1 diabetes mellitus who presents for T1DM management.    Initial HPI consult in March 2023:   A1C 6.4%  Diagnosed age: 7y/o  Pt denies hx of hospitalization for DM and/or pancreatitis.  Family history of DM: None  Only has had DKA 1x possibly- unsure  Establishing care to be closer to home, prev pt of Dr Perrin in [place]  Using Tandem pump in BasalIQ, is due for pump upgrade but needed updated visit note    Interim hx  June 2023- A1C 6.4% today. Upgraded to control IQ pump in April 2023, liking it so far! Did have to change her settings from LOV, we changed from 100-->55 and was constantly snacking w/ this so incr'd back up to middle ground of 75. She does note that once she hits >200 her insulin resistance seems worse, otherwise very insulin sensitive. No pattern to hypo events, occurring very rarely.    Sept 2023- Last A1c value was 6.2% done 9/15/2023.  Current DM meds: Tandem pump settings (below)   Doing well overall, liking the pump changes we made. Is often manually entering carbs instead of using carb ratio; very busy w/ work and kids  Starting taking iron supplements, has more energy now. Also recently dx'd with ADHD, tried anxiety meds but wasn't a good fit    Nov 2023-  Newly pregnant, LMP 10/4/23, approx 8 wks pregnant, saw OB yesterday ([facility])  Has noticed higher BG readings the past few weeks, using more insulin  Previous pregnancy used Control IQ, prior to that was on medtronic pump    January 2024- w/ endo APN, re: DM. Last A1c value was 6.2% done 9/15/2023. Approx 17 wks pregnany.   Changes made at LOV include: pump settings continue to be adjusted qMonday in the setting of pregnancy.  Subjective updates since last office visit: Feeling well, noting more insulin resistance but also thinks she may have had a  faulty site. Some occasional lows. Sometimes overrides the suggested correction because feels it doesn't give enough. Has been feeling tired lately- on vit D, iron supplement, and started baby aspirin at 12 weeks. Unfortunately a lot of ongoing stressors, had a loss in the family so missed an appt.   Blood glucose updates: Checking with CGM, data below  Current DM meds at visit: Tandem Tslim in MANUAL mode, Dexcom G6    Continuous Glucose Monitoring Interpretation  Tristin Gonzalez has undergone continuous glucose monitoring with their CGM.  The blood glucose tracings were evaluated for two weeks prior to office visit.   Blood glucose tracings demonstrated areas of hyperglycemia overnight There were several areas of hypoglycemia noted particularly after a pump override correction bolus.          Tandem Tslim- MANUAL MODE during pregnancy - Humalog U100 insulin and Dexcom G6    Time Basal ICR ISF Active Insulin Time Target   MN 0.60 u/hr 9 48 4h 110   6:30A 0.90 u/hr         10P 0.65 u/hr         TDD 37.33u      Previously trialed/failed: prev on medtronic pump    REVIEW OF SYSTEMS  Ten point review of systems has been performed and is otherwise negative and/or non-contributory, except as described above.     History/Other:   History  Medications:     Current Outpatient Medications:     Continuous Blood Gluc Sensor (DEXCOM G7 SENSOR) Does not apply Misc, 1 each Every 10 days., Disp: 9 each, Rfl: 1    Glucose Blood (CONTOUR NEXT TEST) In Vitro Strip, 1 each by Other route in the morning, at noon, in the evening, and at bedtime., Disp: 200 each, Rfl: 1    glucagon (GVOKE HYPOPEN 2-PACK) 1 MG/0.2ML Subcutaneous SUBQ injection, Inject 0.2 mL (1 mg total) into the skin as needed. Patient trained on Gvoke. No substitutions, Disp: 0.4 mL, Rfl: 1    insulin lispro (HUMALOG) 100 UNIT/ML Injection Solution, Used to fill insulin pump with max 60 u daily.  3-month supply, Disp: 60 mL, Rfl: 1    Insulin Pen Needle (BD PEN NEEDLE  CARMEN U/F) 32G X 4 MM Does not apply Misc, Inject 1 Pen into the skin daily. Use as insulin pump back up, Disp: , Rfl:     insulin glargine (LANTUS SOLOSTAR) 100 UNIT/ML Subcutaneous Solution Pen-injector, Inject 17 Units into the skin nightly., Disp: , Rfl:     busPIRone 5 MG Oral Tab, Take 2 tablets (10 mg total) by mouth daily., Disp: , Rfl:     OneTouch Delica Lancets 33G Does not apply Misc, 1 Lancet by Finger stick route As Directed., Disp: , Rfl:     Glucose Blood (ONETOUCH VERIO) In Vitro Strip, 1 each by Other route 4 (four) times daily., Disp: , Rfl:     Microlet Lancets Does not apply Misc, 1 each by Other route TID & HS., Disp: , Rfl:      Allergies:   No Known Allergies    Social History:   Social History     Socioeconomic History    Marital status:    Tobacco Use    Smoking status: Never    Smokeless tobacco: Never   Substance and Sexual Activity    Alcohol use: Yes     Alcohol/week: 1.0 standard drink of alcohol     Types: 1 Glasses of wine per week     Comment: 1 per week occasionally    Drug use: Never    Sexual activity: Yes       Medical History:   Reviewed    Surgical history:   Past Surgical History:   Procedure Laterality Date      SECTION LEVEL I  2020      SECTION LEVEL I  2017    WISDOM TEETH REMOVED         Objective:   Vitals:  Vitals:    24 0845   BP: 110/64   Pulse: 101   SpO2: 98%       Physical exam:  General Appearance:  alert, well developed, in no acute distress  Eyes:  normal conjunctivae, sclera  Respiratory:  breathing comfortably  Musculoskeletal:  normal muscle strength and tone  Skin:  normal moisture and skin texture  Hair & Nails:  normal scalp hair     Psychiatric:  oriented to time, self, and place  Neuro:  sensory grossly intact and motor grossly intact    Labs/Imaging: Pertinent imaging reviewed.    Assessment & Plan:     (E10.9) Type 1 diabetes mellitus without complication (HCC)  (primary encounter diagnosis)    Pre pregnancy A1C  6.2%,to goal <6.5%. BG are stable, some occasional low BG after a pump override. Pt also states sometimes she is not to target but the calculation will not allow for more insulin, so she overrides. Will adjust AIT which should help w/ this as well.     We discussed the importance of glycemic control to prevent complications of diabetes. We discussed the importance of SBGM and consistent, low carb diet as well as moderate exercise as well.  We also discussed the complications of diabetes include retinopathy, neuropathy, nephropathy and cardiovascular disease.     Tandem TSlim pump- Manual mode-- Using Humalog U100 (fills with ~130u q3day)    Time Basal ICR ISF Active Insulin Time Target   MN 0.60 u/hr 9 --> 10 48 --> 45 4h --> 3h 110   6:30A 0.90 u/hr         10P 0.65 u/hr             - repeat TFTs (fT4 on low end of normal), add on antiTPO, discussed we can consider adding on LT4 possibly  - Goal is to get as close to 6% as possible without causing hypoglycemia. Fasting blood sugar goal <95. 1 hour after meal <140. 2 hours after meal <120. A1C goal 6-6.5%  - plan for every Monday BG check ins while pregnant  - gvoke sent (11/2023)  - pump backup plan: Lantus 17u nightly, Humalog ICR 1:15, ISF 1:65>100 (updated 9/2023)    DM quality metrics:  - Ophtho: no DR, No data recorded Eye Exam shows Diabetic Retinopathy?: No  - BP controlled, on not on antihypertensives  - LDL at goal 5/2023, defer statin, pregnant  - MAB neg 5/2023  - Neuropathy/ Foot exam: Last Foot Exam: 03/13/23  - CAD: none       Return in about 1 month (around 2/29/2024) for review pump settings, also plan for weekly check in via [employer] during pregnancy.    A total of 30 minutes was spent today on obtaining history, reviewing pertinent labs, evaluating patient, providing multiple treatment options, reinforcing diet/exercise and compliance, and completing documentation.       1/31/2024  YEMI Gilbert

## 2024-01-31 NOTE — PATIENT INSTRUCTIONS
Plan:  - complete repeat labs  - upgrade dexcom g6--> g7   - complete your diabetes eye exam. This exam is critical to preventing and treating eye conditions caused by diabetes that could potentially lead to vision loss. Once complete, please call your eye care provider and ask them to fax your latest report to our office. This will help us update our records. Our fax number is: [phone]   Ophthalmologist for diabetes eye exam:  [facility]: Dr Sharon Escalante- [address] [address]- PHONE: [phone]      Medications:     Time Basal ICR ISF Active Insulin Time Target   MN 0.60 u/hr 9 --> 10 48 --> 45 4h --> 3h 110   6:30A 0.90 u/hr         10P 0.65 u/hr             Pregnancy blood sugar targets:  Goal is to get as close to 6% as possible without causing hypoglycemia.   Fasting blood sugar goal <95, 1 hour after meal <140, 2 hours after meal <120.   These targets are supported by American College of Obstetrics and Gynecologists (ACOG) and American Diabetes Association (ADA).    Blood sugar testing:  Please continue checking your blood sugar four times daily (pre-meal, and once before bed, at a minimum), with your Dexcom. If your Dexcom has a reading <70, make sure you verify your reading with a fingerstick. Also, if your Dexcom stops working, resume checking with finger sticks. .     TREATING LOW BLOOD SUGAR: Hypoglycemia  Low blood sugar= Less than 70    How to treat a low blood sugar if you are able to eat/drink: The Rule of 15  If using continuous glucose monitor, verify on fingerstick that your blood sugar is actually low before treating.   Eat 15 grams of carbs (see examples below)  Check your blood sugar after 15 minutes. If it’s still below your target range, have another serving.   Repeat these steps until it’s in your target range. Once it’s in range, if you're nervous about your sugar going low again, have a protein source (ie, a spoonful of peanut  butter).     EASY, PRE-PORTIONED treatment for low blood sugars that are 15G of carbs:   - Children sized fruit snack pack- look for one with 15 grams of total carbohydrate  - Children sized squeeze pouch applesauce  - Small children's sized juicebox- 15g carb  - Glucose tablets from [employer], you can find them near diabetes supplies --> Note, you will need to eat 3-4 to get to 15g of carbs  - 3-4 Starburst candies  - 1 pack of fun sized skittles          Early pregnancy changes  Many women find it extremely challenging to keep blood glucose levels in the target range in the early stage of pregnancy with so many hormonal and physical changes occurring. For around the first six to eight weeks of pregnancy your blood glucose levels may be more unstable. Following these early pregnancy changes to your blood glucose levels, you may find that your insulin requirements are lower until the end of the first trimester. You are likely to need to adjust your insulin doses or pump delivery at this time to reduce the risk of hypos occurring, especially severe ones. It is also important to be aware that during pregnancy, sometimes hypos can occur without much (or any) warning. Preventing a hypo is better than treating one. Try not to miss any meals or snacks and check your blood glucose levels regularly. While most women find that they need lower insulin doses in early pregnancy, this is not the case for everyone. Your diabetes in pregnancy team can help you with advice on individual insulin dose adjustments.      Mid to late pregnancy changes  From the second trimester of pregnancy, especially after 18 weeks your insulin requirements will usually start to rise. By around 30 weeks you may need up to two or three times as much insulin as you did before pregnancy. Hormones made by the placenta interfere with the way your insulin normally works, so as the pregnancy hormones rise, so does your need for insulin.    In the second  half of pregnancy, you are likely to need more mealtime, (rapid-acting/bolus insulin), compared with the long-acting (basal) insulin. Insulin requirements tend to continue to rise until about 34 to 36 weeks, when they may plateau or start to fall a little. If you notice your insulin requirements fall significantly and rapidly in late pregnancy, promptly contact your diabetes in pregnancy team for advice.      Changes after the birth  Once your baby is born and your placenta is delivered, your insulin requirements will fall dramatically. The mother’s insulin requirements tend to be very low for the first few days after the baby is born and then gradually increases. However, if you had Celestone injections before the birth, your insulin requirements will probably fall less.    Your target blood glucose levels should be reviewed after the birth, and frequent blood glucose monitoring is recommended. Target blood glucose targets will be higher than your pregnancy targets. This helps reduce the risk of hypos while you are establishing breastfeeding, and a new routine with your baby.     In the first few weeks, you will usually still need less insulin than you did before the pregnancy. If you are breastfeeding, once your milk comes in your insulin requirements may decrease again. Your endocrinologist or credentialled diabetes educator will help you re-adjust your insulin doses after birth. In the later stages of pregnancy or before you go home from hospital, discuss with your diabetes health professionals the best way of contacting them. It can be challenging managing your diabetes in the early weeks/months with a new baby.    [facility]  My office phone number: [phone]  Fax number: [phone]

## 2024-02-05 ENCOUNTER — TELEPHONE (OUTPATIENT)
Facility: CLINIC | Age: 34
End: 2024-02-05

## 2024-02-05 NOTE — TELEPHONE ENCOUNTER
Monday pregnancy check in- 2/5/2024 - Unknown today.     Patient currently taking:  Time Basal ICR ISF Active Insulin Time Target   MN 0.60 u/hr 10 46 3h 110   6:30A 0.90 u/hr           10P 0.65 u/hr               Provider assessment:  FBG not to goal, 2h PP to goal, and Hypoglycemia present    Plan:  Make the following med changes:    Time Basal ICR ISF Active Insulin Time Target   MN 0.55 --> 0.60 u/hr 10 46 3h 110   6:30A 0.90 u/hr           10P 0.55 --> 0.60 u/hr                 I sent MCM to patient w/ information. Closing encounter.

## 2024-02-05 NOTE — TELEPHONE ENCOUNTER
Uploaded pts Tandem report into [employer] for review.     Per LOV 1/31:     Tandem TSlim pump- Manual mode-- Using Humalog U100 (fills with ~130u q3day)     Time Basal ICR ISF Active Insulin Time Target   MN 0.60 u/hr 9 --> 10 48 --> 45 4h --> 3h 110   6:30A 0.90 u/hr           10P 0.65 u/hr

## 2024-02-08 DIAGNOSIS — O24.011 TYPE 1 DIABETES MELLITUS DURING PREGNANCY IN FIRST TRIMESTER: ICD-10-CM

## 2024-02-08 RX ORDER — PERPHENAZINE 16 MG/1
TABLET, FILM COATED ORAL
Qty: 400 STRIP | Refills: 3 | Status: SHIPPED | OUTPATIENT
Start: 2024-02-08

## 2024-02-08 NOTE — TELEPHONE ENCOUNTER
LOV:     RTC:    FU:    Last Refill:     Month Supply [employer] day supply.    Refill pended and routed for review.

## 2024-02-12 ENCOUNTER — TELEPHONE (OUTPATIENT)
Facility: CLINIC | Age: 34
End: 2024-02-12

## 2024-02-12 NOTE — TELEPHONE ENCOUNTER
Endocrinology- Monday pregnancy check in re: diabetes control- 2/12/2024 - 18w5d today.     Changes made last check-in:  Time Basal ICR ISF Active Insulin Time Target   MN 0.55 --> 0.60 u/hr 10 46 3h 110   6:30A 0.90 u/hr           10P 0.55 --> 0.60 u/hr               Assessment/Plan:  Data reviewed. Currently using 36u/day. FBG not to goal, 2hPP not consistently to goal, frequently hypoglycemic after using ISF  She is having frequent lows, still with some occ post prandial hyperglycemia. Need to first minimize the lows by adjusting ISF, can get better 2hPP control once lows have improved. Ensure she is adequately prebolusing before meals.  Make the following med changes:  Time Basal ICR ISF Active Insulin Time Target   MN 0.60 u/hr 10 --> 11 46 --> 49 3h 110   6:30A 0.90 u/hr           10P 0.60 u/hr               Routed to DM educator to communicate plan to patient.         Using overrides, going low:        Using bolus calculator, going low:

## 2024-02-12 NOTE — TELEPHONE ENCOUNTER
Uploaded pts Tandem report into [employer] for weekly review.     Per last weekly check in 2/5:  I reviewed your report- let's make the following adjustments to your basal rate -- very gentle change just to get your overnight fasting a bit lower.     Time Basal ICR ISF Active Insulin Time Target   MN 0.55 --> 0.60 u/hr 10 46 3h 110   6:30A 0.90 u/hr           10P 0.55 --> 0.60 u/hr

## 2024-02-19 ENCOUNTER — TELEPHONE (OUTPATIENT)
Facility: CLINIC | Age: 34
End: 2024-02-19

## 2024-02-19 NOTE — TELEPHONE ENCOUNTER
Uploaded pts Tandem report into [employer] for weekly review.     Per last weekly check in 2/12:  Make the following med changes:  Endocrinology- Monday pregnancy check in re: diabetes control- 2/19/2024 - 19w5d today.     Changes made last check-in:  Time Basal ICR ISF Active Insulin Time Target   MN 0.60 u/hr 10 --> 11 46 --> 49 3h 110   6:30A 0.90 u/hr           10P 0.60 u/hr               Assessment/Plan:  Data reviewed. FBG to goal/stable, 2hPP not consistently to goal, frequent hypoglycemia particularly after overriding boluses    Relay to patient:  No medication changes today, continue current plan as I want to give it some more time where she has her doses.  I want her to meet with Nova to discuss dosing behaviors prior to our next visit    Time Basal ICR ISF Active Insulin Time Target   MN 0.55 u/hr 9 50 3h 110   6:30A 0.90 u/hr           10P 0.60 u/hr             Routed to pool to communicate plan to patient.

## 2024-02-21 ENCOUNTER — OFFICE VISIT (OUTPATIENT)
Dept: PERINATAL CARE | Facility: HOSPITAL | Age: 34
End: 2024-02-21
Attending: OBSTETRICS & GYNECOLOGY
Payer: COMMERCIAL

## 2024-02-21 VITALS
BODY MASS INDEX: 23.14 KG/M2 | HEIGHT: 66 IN | HEART RATE: 75 BPM | DIASTOLIC BLOOD PRESSURE: 78 MMHG | WEIGHT: 144 LBS | SYSTOLIC BLOOD PRESSURE: 131 MMHG

## 2024-02-21 DIAGNOSIS — O98.512 COVID-19 AFFECTING PREGNANCY IN SECOND TRIMESTER (HCC): ICD-10-CM

## 2024-02-21 DIAGNOSIS — O34.219 HISTORY OF CESAREAN DELIVERY AFFECTING PREGNANCY (HCC): ICD-10-CM

## 2024-02-21 DIAGNOSIS — E10.9 TYPE 1 DIABETES MELLITUS WITHOUT COMPLICATION (HCC): Primary | ICD-10-CM

## 2024-02-21 DIAGNOSIS — U07.1 COVID-19 AFFECTING PREGNANCY IN SECOND TRIMESTER (HCC): ICD-10-CM

## 2024-02-21 DIAGNOSIS — O09.522 MULTIGRAVIDA OF ADVANCED MATERNAL AGE IN SECOND TRIMESTER (HCC): ICD-10-CM

## 2024-02-21 DIAGNOSIS — E10.9 TYPE 1 DIABETES MELLITUS (HCC): ICD-10-CM

## 2024-02-21 PROCEDURE — 76811 OB US DETAILED SNGL FETUS: CPT | Performed by: OBSTETRICS & GYNECOLOGY

## 2024-02-21 RX ORDER — MELATONIN
325 EVERY OTHER DAY
COMMUNITY

## 2024-02-21 RX ORDER — CHOLECALCIFEROL (VITAMIN D3) 25 MCG
1 TABLET,CHEWABLE ORAL DAILY
COMMUNITY

## 2024-02-21 RX ORDER — ASPIRIN 81 MG/1
81 TABLET ORAL DAILY
COMMUNITY

## 2024-02-21 NOTE — PROGRESS NOTES
Outpatient Maternal-Fetal Medicine Consultation    Dear Dr. Perales    Thank you for requesting ultrasound evaluation and maternal fetal medicine consultation on your patient Tristin Gonzalez.  As you are aware she is a 33 year old female  with a lobo pregnancy and an Estimated Date of Delivery: 7/10/24.  A maternal-fetal medicine consultation was requested secondary to Advanced Maternal Age and Diabetes, pre-gestational.  Her prenatal records and labs were reviewed.    She is on a pump.  Managed by Endo.  Diabetes type diagnosed at age 6.  Only has had 1 episode potentially of DKA.  She is overall easily controlled.  No retinopathy.  No kidney disease from the diabetes.  ROS    HISTORY  OB History    Para Term  AB Living   3 2 2   0     SAB IAB Ectopic Multiple Live Births                  # Outcome Date GA Lbr Peter/2nd Weight Sex Delivery Anes PTL Lv   3 Current            2 Term            1 Term                Allergies:  No Known Allergies   Current Meds:  Current Outpatient Medications   Medication Sig Dispense Refill    prenatal vitamin with DHA 27-0.8-228 MG Oral Cap Take 1 capsule by mouth daily.      cholecalciferol (VITAMIN D3) 125 MCG (5000 UT) Oral Cap Take 1 capsule (5,000 Units total) by mouth daily.      aspirin 81 MG Oral Tab EC Take 1 tablet (81 mg total) by mouth daily.      ferrous sulfate 325 (65 FE) MG Oral Tab EC Take 1 tablet (325 mg total) by mouth every other day.      insulin lispro (HUMALOG) 100 UNIT/ML Injection Solution Used to fill insulin pump with max 60 u daily.  3-month supply 60 mL 1    Glucose Blood (CONTOUR NEXT TEST) In Vitro Strip Use to test BG up to four times daily. 400 strip 3    Continuous Blood Gluc Sensor (DEXCOM G7 SENSOR) Does not apply Misc 1 each Every 10 days. 9 each 1    glucagon (GVOKE HYPOPEN 2-PACK) 1 MG/0.2ML Subcutaneous SUBQ injection Inject 0.2 mL (1 mg total) into the skin as needed. Patient trained on Gvoke. No substitutions 0.4 mL  1    Insulin Pen Needle (BD PEN NEEDLE CARMEN U/F) 32G X 4 MM Does not apply Misc Inject 1 Pen into the skin daily. Use as insulin pump back up      insulin glargine (LANTUS SOLOSTAR) 100 UNIT/ML Subcutaneous Solution Pen-injector Inject 17 Units into the skin nightly.      busPIRone 5 MG Oral Tab Take 2 tablets (10 mg total) by mouth daily.      OneTouch Delica Lancets 33G Does not apply Misc 1 Lancet by Finger stick route As Directed.      Glucose Blood (ONETOUCH VERIO) In Vitro Strip 1 each by Other route 4 (four) times daily.      Microlet Lancets Does not apply Misc 1 each by Other route TID & HS.          HISTORY:  Past Medical History:   Diagnosis Date    Insulin pump in place     Type 1 diabetes mellitus (HCC)       Past Surgical History:   Procedure Laterality Date    HC  SECTION LEVEL I  2020      SECTION LEVEL I  2017    WISDOM TEETH REMOVED        Family History   Problem Relation Age of Onset    Other (multiple sclerosis) Mother     Other (Hemochromatosis) Father     Asthma Brother     Colon Cancer Maternal Grandmother     Other (CABG) Maternal Grandfather     Dementia Maternal Grandfather     Other (Coronary artery disease) Maternal Grandfather     Colon Cancer Paternal Grandmother 70    Blood Cancer Paternal Grandfather 70        Lymphoma?      Social History     Socioeconomic History    Marital status:    Tobacco Use    Smoking status: Never    Smokeless tobacco: Never   Substance and Sexual Activity    Alcohol use: Yes     Alcohol/week: 1.0 standard drink of alcohol     Types: 1 Glasses of wine per week     Comment: 1 per week occasionally    Drug use: Never    Sexual activity: Yes          PHYSICAL EXAMINATION:  /78 (BP Location: Right arm, Patient Position: Sitting, Cuff Size: adult)   Pulse 75   Ht 5' 6\" (1.676 m)   Wt 144 lb (65.3 kg)   BMI 23.24 kg/m²   Physical Exam  Constitutional:       Appearance: Normal appearance.   Abdominal:      Palpations:  Abdomen is soft.      Tenderness: There is no abdominal tenderness.   Neurological:      Mental Status: She is alert.   Psychiatric:         Mood and Affect: Mood normal.         Behavior: Behavior normal.           OBSTETRIC ULTRASOUND  The patient had a level II ultrasound today which revealed size consistent with dates and a normal detailed anatomic survey.  Ultrasound Findings:  Single IUP in cephalic presentation.    Placenta is posterior.   A 3 vessel cord is noted.  Cardiac activity is present at 131 bpm   g ( 0 lb 13 oz);   MVP is 3 cm .     The fetal measurements are consistent with the established EDC. No ultrasound evidence of structural abnormalities are seen today. The nasal bone is present. No ultrasound evidence of markers for aneuploidy are seen. She understands that ultrasound exam cannot exclude genetic abnormalities and that genetic testing is recommended. The limitations of ultrasound were discussed.     Uterus and adnexa appeared WNL today on US  See PACS/Imaging Tab For Complete Ultrasound Report  I interpreted the results and reviewed them with the patient.    DISCUSSION  During her visit we discussed and reviewed the following issues:  ADVANCED MATERNAL AGE    Background  I reviewed with the patient that pregnancies in women of advanced maternal age (35 or older at delivery) are associated with elevated risks. Specifically, there is a higher rate of:  Fetal malformations  Preeclampsia  Gestational diabetes  Intrauterine fetal death    As a result, enhanced pregnancy surveillance is advised for these patients including a comprehensive ultrasound to assess for fetal malformations (at 20 weeks) and a third trimester ultrasound assessment for fetal growth (at 32 weeks). In addition, weekly NST's (initiating at 36 weeks gestation for women 35-39 years and at 32 weeks gestation for women 40 years and older) are also advised. Routine obstetric care is more than adequate to assess for  gestational diabetes and preeclampsia; hence, no further significant alterations in obstetric care are advised.    Medical Complications    Women 35 years of age or older can expect to experience two to three fold higher rates of hospitalization,  delivery, and pregnancy-related complications when compared to their younger counterparts.  The two most common medical problems complicating these  pregnanccies are hypertension and diabetes.   The incidence of preeclampsia in the general obstetric population is 3 to 4 percent; this increases to 5 to 10 percent in women over age 40 and is as high as 35 percent in women over age 50.   The incidence of gestational diabetes in the general obstetric population is 3 percent, rising to 7 to 12 percent in women over age 40 and 20 percent in women over age 50.  Women 35 years of age or older are more likely to be delivered by . The  delivery rate in the general obstetric population of the [place] is almost 30 percent, compared to almost 50 percent in women over age 40 to 45 and almost 80 percent in women age 50 to 63.          Fetal Death        A decision analysis tool using data from the [place] [facility]  Database predicted a strategy of weekly antepartum testing and labor induction would lower the risk of unexplained fetal death in women 35 years of age or older. In this model, weekly testing starting at 36 weeks of gestation would drop the risk of fetal death from 5.2 to 1.3 per 1000 pregnancies. While a policy of antepartum testing in older women does increase the chance that a women will be induced (71 inductions per fetal death averted) and thereby increases her risk of having a  delivery, only 14 additional cesareans would need to be performed to avert one unexplained fetal death.  Hence, weekly NST's are advised for women of advanced maternal age; testing should be initiated at 36 weeks for women 35-39 years and at 32 weeks  for women 40 years and older.    Fetal Malformations    Cardiac malformations, clubfoot, and diaphragmatic hernia appear to occur with increased frequency in offspring of older women. These abnormalities are structural and unrelated to aneuploidy, thus they would not be detected by karyotype analysis.  For these reasons a complete, detailed ultrasound (level II) is advised even if the fetus has a normal karyotype.      Fetal Aneuploidy      Invasive Testing  I offered invasive genetic testing (amniocentesis, chorionic villus sampling) after reviewing the diagnostic accuracy of these tests as well as the procedure associated loss rate (1:500 for genetic amniocentesis).    She ultimately does not desire invasive genetic testing.     We discussed  the increased risk of chromosomal abnormalities associated with advanced maternal age at age  34 year old. She understands that ultrasound exam cannot exclude potential genetic abnormalities.  Her estimated risk based on maternal age at term with any chromosome abnormality is about 1: 150 and with Down Syndrome is about 1: 267.   We also discussed the risks and benefits of having  genetic testing ([facility] and amniocentesis) performed.      Non-invasive Pregnancy Testing (NIPT)  I reviewed current non-invasive screening options. Currently non-invasive pregnancy testing (NIPT) offers the highest detection rate (with the lowest false positive rate) for the detection of fetal aneuploidy amongst high-risk patients. The limitations of detailed mid-trimester sonography was reviewed with the patient. First trimester screening and second trimester multiple-marker serum serum screening as alternative aneuploidy screening options were also reviewed. However, both of these tests are associated with lower detection and higher false positive rates.    DIABETES MELLITUS  Diabnosed age 6.  One episode on DKA possible.    We discussed the risks associated with pregestational diabetes.  There is  an increased risk of congenital malformations, fetal growth disturbances, preeclampsia, spontaneous  and intrauterine fetal demise (IUFD).  Infants of diabetic mothers (IDMs) are also at increased risk for hypoglycemia and hyperbilirubinemia.    Congenital Malformations:  Data from multiple studies have consistently shown a higher risk of major congenital malformations and miscarriage associated with increasing first trimester glycated hemoglobin values. Although glycated hemoglobin values from different laboratories may not be comparable because of differences in methodology and a lack of standardization among laboratories, a value >1 percent above the upper limit of the normal range is associated with an increased risk of congenital anomalies.  Patients with a markedly elevated glycated hemoglobin value have a markedly increased risk of congenital anomalies, particularly neural tube and cardiac defects. I informed the patient that more information about fetal development will be obtained from first and second trimester sonographic examinations and maternal serum analyte results.     Fetal Growth Disturbances:   Maternal diabetes mellitus may double the incidence of LGA infants; it also changes the anthropometric measurements of infants of diabetic mothers (IDMs) compared with offspring of women without diabetes. Specifically, the chest-to-head and shoulder-to-head ratios are increased in IDMs.  LGA fetuses are at increased risk for a prolonged second stage of labor, shoulder dystocia, operative delivery, maternal and infant birth trauma, and  death. Maternal diabetes mellitus increases the likelihood of shoulder dystocia two- to six-fold compared to the population without diabetes and increases the likelihood of dystocia-associated fetal morbidity, such as brachial plexus injury. Accelerated fetal growth is also associated with an increased risk of  metabolic and physiologic disturbances.  Continued control of blood glucose concentration during the third trimester is important to minimize the risk of these complications.   Impaired fetal growth is more common among women with diabetic vasculopathy and/or superimposed preeclampsia. It is associated with increased fetal and  morbidity and mortality, and has long-term health implications.  If there is evidence of intrauterine growth restriction, which is uncommon, but often related to preeclampsia or preexisting maternal vasculopathy, tests of fetal well-being are initiated.    Preeclampsia:  The incidences of hypertension and preeclampsia are increased in pregnant women with diabetes and are related to pregestational hypertension and vascular and renal disease. Poor glycemic control also appears to play a role.  Diagnosis and management of preeclampsia are similar to that in women without diabetes, except among those who enter pregnancy with preexisting nephropathy. In these women, diagnosing preeclampsia can be difficult and requires relying on deterioration of other markers.    Intrauterine Fetal Death (IUFD):  Intrauterine fetal demise is now a rare complication of diabetic pregnancy, primarily due to achievement of good glycemic control. The fetus of the diabetic mother is at risk for hypoxia primarily from two mechanisms: (1) fetal hyperglycemia and hyperinsulinemia increase fetal oxygen consumption, which may induce fetal hypoxemia and acidosis if the oxygen needs of the fetus are not met and (2) maternal vasculopathy and hyperglycemia can lead to reduced uteroplacental perfusion, which may be associated with reduced fetal growth.  The American College of Obstetricians and Gynecologists (ACOG) recommends antepartum fetal testing for pregnancies complicated by pregestational diabetes. There are no data from large or randomized trials on which to make an evidenced-based recommendation as to which pregnancies complicated by diabetes should  undergo fetal surveillance, when to start, what test to order, or how often to perform it.   Most experts agree starting weekly NST's at 32 weeks of gestation and increasing the frequency of testing to two times per week from 36 weeks until delivery. In complicated patients (IUGR, oligohydramnios, preeclampsia, or poorly controlled blood glucose concentrations) testing may start earlier in gestation and is performed more frequently. Any significant deterioration in maternal status necessitates reevaluation of the fetus. The frequency of intrauterine fetal death (excluding congenital malformations) with such protocols is approximately 3 per 1000 pregnancies in women with type 1 diabetes.    Glycemic Control:  We discussed goals of optimal glucose control: Fasting levels between 60 - 95, and - hour postprandial values of less than 120.  Blood sugars should be check at least 4 times daily and should be sent to the MFM office for weekly MFM physician review.     Maternal Testing:  Recommendations for pregnancy testing were discussed with the patient. I advised regular  opthomalogical evaluation (with repeat evaluation as indicated), ECG, 24 hour urine collection for protein and creatinine clearance, as well as serum labs including a complete metabolic profile, CBC and HgB A1C.    Fetal Evaluation:  Fetal testing was also reviewed with the patient.  First trimester dating and early anatomic assessment is advised as well as a targeted ultrasound at 20-22 weeks gestation and a fetal echocardiogram at 22-24 weeks gestation.  In addition, growth ultrasounds should be performed every 4 weeks in the third trimester.  Finally, weekly NST evaluations should be initiated at 32 weeks gestation and increased to twice weekly at 36 weeks.      Managed by endo.    Prevention of Preeclampsia    Antiplatelet Agents  The observation that preeclampsia is associated with increased platelet turnover and increased platelet-derived  thromboxane levels led to randomized trials evaluating low-dose aspirin therapy in women thought to be at increased risk of the disease. As opposed to higher dose aspirin therapy, low-dose aspirin (60 to 150 mg per day) diminishes platelet thromboxane synthesis while maintaining vascular wall prostacyclin synthesis. Although not well studied, the beneficial effect of low-dose aspirin for prevention of preeclampsia may also be in part related to modulation of inflammation. The drug has been studied for both prevention of preeclampsia and prevention of progression from mild to severe disease. It appears to result in a modest reduction in risk of preeclampsia when given to women at moderate to high risk of preeclampsia.  This approach has been studied in over 35,000 women, for both prevention of preeclampsia and prevention of progression of the disease. Low dose aspirin has a modest impact on pregnancy outcome; it reduces the risk of preeclampsia, as well as other adverse pregnancy outcomes (eg,  delivery, fetal growth restriction) by about 10 to 20 percent. Low dose aspirin is safe in pregnancy; thus, it is a reasonable strategy in women with a moderate to high risk of preeclampsia in whom the benefits outweigh the risks.     Clinical Guidelines  Based on the available data, low-dose aspirin is advised for women at high risk for preeclampsia. There is no consensus on the criteria that confer high risk. The [place] Preventive Services Task Force (USPSTF) risk criteria are reasonable.  USPSTF criteria for high risk include one or more of the following:   Previous pregnancy with preeclampsia, especially early onset and with an adverse outcome   Multifetal gestation  Chronic hypertension   Type 1 or 2 diabetes mellitus  Chronic kidney disease  Autoimmune disease (antiphospholipid syndrome, systemic lupus erythematosus)     Women with multiple moderate risk factors for preeclampsia are considered high risk, as  well. Identification of women with an appropriate combination of moderate risk factors to be considered high risk is subjective and determined case by case, as the data describing the magnitude of the association between each of these risk factors and development of preeclampsia vary widely and lack consistency. USPSTF criteria for moderate risk include:  Nulliparity  Obesity (body mass index >30 kg/m2)  Family history of preeclampsia in mother or sister  Age ?35 years  Sociodemographic characteristics (, low socioeconomic level)  Personal risk factors (eg, history of low birth weight or small for gestational age, previous adverse pregnancy outcome, >10 year pregnancy interval)     If used, daily low-dose aspirin should be initiated in the 12th or 13th week of gestation, although adverse effects from earlier initiation (eg, preconception) have not been documented. The optimum low dose is unclear; 81 mg is readily available, but 100 or 150 mg (which are not available in some countries including the [place] may be more effective.  In some pregnant women, platelet function is not inhibited by the 81 mg dose but is altered by higher dosing. Hence, current evidence has suggested a daily dose of 100 to 150 mg of aspirin rather than a lower dose. In the [place], this can be achieved by taking one and one-half 81 mg tablets; however, taking one 81 mg tablet is also reasonable since this is the commercially available dose and has proven efficacy. For prevention of preeclampsia, no trials have evaluated the efficacy of a higher dose of aspirin, which could be achieved easily by taking two 81 mg tablets or splitting a 325 mg tablet. For prevention of myocardial infarction and stroke in nonpregnant individuals, aspirin appears to be equally effective at doses between 75 and 325 mg per day.  The safety of low-dose aspirin use in the second and third trimesters is well established, but questions linger  regarding use in the first trimester (possible increase in minor vaginal bleeding or gastroschisis). Early therapy (before 16 weeks) may be important since the pathophysiologic features of preeclampsia develop at this time, weeks before clinical disease is apparent. However, available evidence is inconsistent about the importance of early initiation of therapy, possibly because aspirin has major effects on prostacyclin production and endothelial function throughout gestation.  Aspirin is discontinued 5 to 10 days before expected delivery to diminish the risk of bleeding during delivery; however, no adverse maternal or fetal effects related to low-dose aspirin have been proven.  Major questions remain as to which, if any, subgroups of women are more likely to benefit from low-dose aspirin therapy, when treatment should be started (first or second trimester), and the optimum dose to inhibit placental PGH synthase (cyclooxygenase) and also allow the anti-inflammatory effects of low-dose aspirin.          Despite extensive clinical and epidemiologic research, the etiology of placental abruption is heterogeneous and speculative. Risk factors for placental abruption can be classified as those associated with an acute etiology;  medical and obstetrical risk factors; and  maternal sociodemographic and behavioral risk factors.     Medical and obstetrical risk factors for placental abruption include the hypertensive disorders of pregnancy, premature rupture of membranes, chorioamnionitis (especially at  gestational ages), ischemic placental disease in the current or previous pregnancy (including previous placental abruption, small for gestational age infant, obesity and preeclampsia), and inherited thrombophilia.  Trauma is a factor for acute abruption and fibroids may increase the risk.         As many as 10 percent of women using cocaine in the third trimester will suffer placental abruption. The pathophysiological  effect of cocaine in the mynor of abruption is unknown, but may be related to cocaine-induced acute vasoconstriction leading to ischemia, reflex vasodilatation, and disruption of vascular integrity. We do not routinely perform toxicology screening on all patients with unexplained placental abruption, but we do obtain urine toxicology tests in selected patients.       Smoking is associated with a 2.5 fold increased risk of abruption severe enough to result in fetal death; the risk increases by 40 percent for each pack per day smoked. The effects of cigarette smoking and hypertension are synergistic. A possible mechanism is that the vasoconstrictive effects of cigarette smoking may cause placental hypoperfusion, which could result in decidual ischemia, necrosis, and hemorrhage leading to placental separation. Chorionic villous hemorrhage and intervillous thrombosis are observed in abruptions among cigarette smokers.           Women with placental abruption are at several-fold higher risk of abruption in a subsequent pregnancy. The risk of recurrence has been reported to be 5 to 15 percent, compared to a baseline incidence of 0.4 to 1.3 percent in the general population. After two consecutive abruptions, the risk of a third rises to 20 to 25 percent. The risk of recurrence is higher after a severe abruption than after a mild abruption, and the woman's sisters appear to be at increased risk of also having an abruption. When the abruption is severe enough to kill the fetus, there is a 7 percent incidence of the same outcome in a future pregnancy.        Unfortunately, there are no studies demonstrating that any intervention lowers this risk. Nonetheless, it is reasonable to identify risk factors for abruption and address those risk factors that are modifiable. Women who smoke or use cocaine should be encouraged to stop. Poorly controlled hypertension should be controlled. These changes have proven health benefits even  in the absence of pregnancy. On the other hand, placental abruption resulting from trauma is not likely to recur, so these women can be reassured.        There are no laboratory screening tests that are predictive of an increased risk for abruption. Testing women with a history of abruption for antiphospholipid antibodies or an inherited thrombophilia is not indicated unless other factors are present that could be associated with thrombophilia.     She has type 1 diabetes and has been delivered at 35 weeks for an abruption and then was delivered more in her last pregnancy at 37 weeks for elevated blood pressure.  Therefore I recommended that she be delivered at 38 weeks this pregnancy .    IMPRESSION:  IUP at 20w0d   AMA --NIPT low risk, declines invasive testing   Type I diabetes  History abruption at 35 weeks in Preg #1  History elevated BP and delivery 37 weeks Preg #2  Prior  x 2    RECOMMENDATIONS:  Continue care with Dr. Perales  Check glucoses 4x/day and send to Endo weekly  Fetal heart study at 22-24 wks    Monthly growth US in third trimester with BPP at or beyond 32 weeks  Weekly NST at    32 wks / twice weekly at  34  wks      Hgb A1C q trimester  Baseline urine P/C ratio , CMP or AST/ALT/creatinine, TSH  EKG     Ophthalmology exam      Insulin as above  Aspirin 81-120mg daily   Delivery at 38 weeks          Thank you for allowing me to participate in the care of your patient.  Please do not hesitate to contact me if additional questions or concerns arise.      Randi Oswald M.D.    40 minutes spent in review of records, patient consultation, documentation and coordination of care.  The relevant clinical matter(s) are summarized above.     Note to patient and family  The 21st Century Cures Act makes medical notes available to patients in the interest of transparency.  However, please be advised that this is a medical document.  It is intended as gdnb-wy-tbuf communication.  It is written and  medical language may contain abbreviations or verbiage that are technical and unfamiliar.  It may appear blunt or direct.  Medical documents are intended to carry relevant information, facts as evident, and the clinical opinion of the practitioner.

## 2024-02-26 ENCOUNTER — TELEPHONE (OUTPATIENT)
Facility: CLINIC | Age: 34
End: 2024-02-26

## 2024-02-26 NOTE — TELEPHONE ENCOUNTER
Endocrinology- Monday pregnancy check in re: diabetes control- 2/26/2024 - 20w5d today.     Changes made last check-in:  NO changes made  Current pump settings:  Tandem Tslim + manual mode + Dexcom G6  Time Basal ICR ISF Active Insulin Time Target   MN 0.55 u/hr 9 50 3h 110   0630 0.90 u/hr         10P 0.60 u/hr         TDD 39.40u     Assessment/Plan:  Data reviewed. FBG to goal/stable, 2hPP not consistently to goal, occasional hypoglycemia noted  Make the following med changes:  Time Basal ICR ISF Active Insulin Time Target   MN 0.55 u/hr 9 50 --> 46  3h 110   0630 0.90 u/hr         10P 0.60 u/hr         - the ISF change should help her not have to override to give a correction dose- overall things look much more stable than previous weeks, so don't want to adjust anything else for right now. Reminder: 2h PP goal <120      Routed to DM educator to communicate plan to patient.

## 2024-02-26 NOTE — TELEPHONE ENCOUNTER
Uploaded pts Tandem report into [employer] for review.     Per last weekly check in 2/19:  No medication changes today, continue current plan as I want to give it some more time where she has her doses.  I want her to meet with Nova to discuss dosing behaviors prior to our next visit

## 2024-02-29 ENCOUNTER — OFFICE VISIT (OUTPATIENT)
Facility: CLINIC | Age: 34
End: 2024-02-29
Payer: COMMERCIAL

## 2024-02-29 VITALS
BODY MASS INDEX: 23.14 KG/M2 | SYSTOLIC BLOOD PRESSURE: 102 MMHG | HEIGHT: 66 IN | DIASTOLIC BLOOD PRESSURE: 60 MMHG | WEIGHT: 144 LBS | OXYGEN SATURATION: 100 % | HEART RATE: 95 BPM

## 2024-02-29 DIAGNOSIS — Z96.41 INSULIN PUMP IN PLACE: ICD-10-CM

## 2024-02-29 DIAGNOSIS — Z46.81 INSULIN PUMP TITRATION: ICD-10-CM

## 2024-02-29 DIAGNOSIS — O24.012: Primary | ICD-10-CM

## 2024-02-29 PROCEDURE — 3008F BODY MASS INDEX DOCD: CPT

## 2024-02-29 PROCEDURE — 95251 CONT GLUC MNTR ANALYSIS I&R: CPT

## 2024-02-29 PROCEDURE — 99214 OFFICE O/P EST MOD 30 MIN: CPT

## 2024-02-29 PROCEDURE — 3074F SYST BP LT 130 MM HG: CPT

## 2024-02-29 PROCEDURE — 3078F DIAST BP <80 MM HG: CPT

## 2024-02-29 RX ORDER — INSULIN LISPRO 100 [IU]/ML
INJECTION, SOLUTION INTRAVENOUS; SUBCUTANEOUS
Qty: 70 ML | Refills: 0 | Status: SHIPPED | OUTPATIENT
Start: 2024-02-29

## 2024-02-29 NOTE — PROGRESS NOTES
Endocrinology Clinic Note    Name: Tristin Gonzalez  Date: 2/29/2024      HISTORY OF PRESENT ILLNESS   Tristin Gonzalez is a 33 year old female with PMHx significant for type 1 diabetes mellitus who presents for T1DM management.    Initial HPI consult in March 2023:   A1C 6.4%  Diagnosed age: 5y/o  Pt denies hx of hospitalization for DM and/or pancreatitis.  Family history of DM: None  Only has had DKA 1x possibly- unsure  Establishing care to be closer to home, prev pt of Dr Perrin in [place]  Using Tandem pump in BasalIQ, is due for pump upgrade but needed updated visit note    Interim hx  June 2023- A1C 6.4% today. Upgraded to control IQ pump in April 2023, liking it so far! Did have to change her settings from LOV, we changed from 100-->55 and was constantly snacking w/ this so incr'd back up to middle ground of 75. She does note that once she hits >200 her insulin resistance seems worse, otherwise very insulin sensitive. No pattern to hypo events, occurring very rarely.    Sept 2023- Last A1c value was 6.2% done 9/15/2023.  Current DM meds: Tandem pump settings (below)   Doing well overall, liking the pump changes we made. Is often manually entering carbs instead of using carb ratio; very busy w/ work and kids  Starting taking iron supplements, has more energy now. Also recently dx'd with ADHD, tried anxiety meds but wasn't a good fit    Nov 2023-  Newly pregnant, LMP 10/4/23, approx 8 wks pregnant, saw OB yesterday ([facility])  Has noticed higher BG readings the past few weeks, using more insulin  Previous pregnancy used Control IQ, prior to that was on medtronic pump    January 2024- w/ endo APN, re: DM. Last A1c value was 5.7% done 1/31/2024. Approx 17 wks pregnany.   Changes made at LOV include: pump settings continue to be adjusted qMonday in the setting of pregnancy.  Subjective updates since last office visit: Feeling well, noting more insulin resistance but also thinks she may have had a  faulty site. Some occasional lows. Sometimes overrides the suggested correction because feels it doesn't give enough. Has been feeling tired lately- on vit D, iron supplement, and started baby aspirin at 12 weeks. Unfortunately a lot of ongoing stressors, had a loss in the family so missed an appt.   Blood glucose updates: Checking with CGM, data below  Current DM meds at visit: Tandem Tslim in MANUAL mode, Dexcom G6    February 2024- w/ catherine APN. Last A1c value was 5.7% done 1/31/2024.  Currently 21 weeks, had 20 wk scan & baby girl looked good. She is hoping to make it to 38 wks, planning on csection. Saw MFM. She is hoping to wear pump during Csection/self manage BG while admitted. States overall her energy is good  Current DM meds at visit: Tandem Tslim in MANUAL mode, Dexcom G6    Continuous Glucose Monitoring Interpretation  Tristin Gonzalez has undergone continuous glucose monitoring with their CGM.  The blood glucose tracings were evaluated for two weeks prior to office visit.   Blood glucose tracings demonstrated areas of hyperglycemia post meal occasionally  There were several areas of hypoglycemia noted.            Tandem Tslim- Manual mode during pregnancy - Humalog U100 insulin and Dexcom G6    Time Basal ICR ISF Active Insulin Time Target   MN 0.60 u/hr 9 46  3h 110   0630 0.90 u/hr           10P 0.650 u/hr           TDD 37.24u      Previously trialed/failed: prev on medtronic pump    REVIEW OF SYSTEMS  Ten point review of systems has been performed and is otherwise negative and/or non-contributory, except as described above.     History/Other:   History  Medications:     Current Outpatient Medications:     insulin lispro (HUMALOG) 100 UNIT/ML Injection Solution, Used to fill insulin pump with max 70u daily.  3-month supply, Disp: 70 mL, Rfl: 0    prenatal vitamin with DHA 27-0.8-228 MG Oral Cap, Take 1 capsule by mouth daily., Disp: , Rfl:     cholecalciferol (VITAMIN D3) 125 MCG (5000 UT) Oral Cap, Take  1 capsule (5,000 Units total) by mouth daily., Disp: , Rfl:     aspirin 81 MG Oral Tab EC, Take 1 tablet (81 mg total) by mouth daily., Disp: , Rfl:     ferrous sulfate 325 (65 FE) MG Oral Tab EC, Take 1 tablet (325 mg total) by mouth every other day., Disp: , Rfl:     Glucose Blood (CONTOUR NEXT TEST) In Vitro Strip, Use to test BG up to four times daily., Disp: 400 strip, Rfl: 3    Continuous Blood Gluc Sensor (DEXCOM G7 SENSOR) Does not apply Misc, 1 each Every 10 days., Disp: 9 each, Rfl: 1    glucagon (GVOKE HYPOPEN 2-PACK) 1 MG/0.2ML Subcutaneous SUBQ injection, Inject 0.2 mL (1 mg total) into the skin as needed. Patient trained on Gvoke. No substitutions, Disp: 0.4 mL, Rfl: 1    Insulin Pen Needle (BD PEN NEEDLE CARMEN U/F) 32G X 4 MM Does not apply Misc, Inject 1 Pen into the skin daily. Use as insulin pump back up, Disp: , Rfl:     insulin glargine (LANTUS SOLOSTAR) 100 UNIT/ML Subcutaneous Solution Pen-injector, Inject 17 Units into the skin nightly., Disp: , Rfl:     busPIRone 5 MG Oral Tab, Take 2 tablets (10 mg total) by mouth daily., Disp: , Rfl:     OneTouch Delica Lancets 33G Does not apply Misc, 1 Lancet by Finger stick route As Directed., Disp: , Rfl:     Glucose Blood (ONETOUCH VERIO) In Vitro Strip, 1 each by Other route 4 (four) times daily., Disp: , Rfl:     Microlet Lancets Does not apply Misc, 1 each by Other route TID & HS., Disp: , Rfl:      Allergies:   No Known Allergies    Social History:   Social History     Socioeconomic History    Marital status:    Tobacco Use    Smoking status: Never    Smokeless tobacco: Never   Substance and Sexual Activity    Alcohol use: Yes     Alcohol/week: 1.0 standard drink of alcohol     Types: 1 Glasses of wine per week     Comment: 1 per week occasionally    Drug use: Never    Sexual activity: Yes       Medical History:   Reviewed    Surgical history:   Past Surgical History:   Procedure Laterality Date    HC  SECTION LEVEL I  2020     HC  SECTION LEVEL I  2017    WISDOM TEETH REMOVED         Objective:   Vitals:  Vitals:    24 0841   BP: 102/60   Pulse: 95   SpO2: 100%   Weight: 144 lb (65.3 kg)   Height: 5' 6\" (1.676 m)         Physical exam:  General Appearance:  alert, well developed, in no acute distress  Eyes:  normal conjunctivae, sclera  Respiratory:  breathing comfortably  Musculoskeletal:  normal muscle strength and tone  Skin:  normal moisture and skin texture  Hair & Nails:  normal scalp hair     Psychiatric:  oriented to time, self, and place  Neuro:  sensory grossly intact and motor grossly intact    Labs/Imaging: Pertinent imaging reviewed.    Assessment & Plan:     ICD-10-CM    1. Type 1 diabetes mellitus affecting pregnancy in second trimester, antepartum (HCC)  O24.012 GLUC MNTR CONT REC FROM NTRSTL TISS FLU PHYS I&R     insulin lispro (HUMALOG) 100 UNIT/ML Injection Solution      2. Insulin pump in place  Z96.41       3. Insulin pump titration  Z46.81         A1C 5.7% in 2024, to goal <6.5%. BG generally to target- FBG not consistently <95. Asked pt to fingerstick the next two days and gently incr basal rate by 0.05u/hr to FBG goal <95. Discussed adjusting AIT to 2.5h which would help reduce her need to override pump which sometimes leads to subsequent low BG.    No changes to settings today:  Tandem TSlim pump- Manual mode-- Using Humalog U100  Time Basal ICR ISF Active Insulin Time Target   MN 0.60 u/hr 9 46  3h 110   0630 0.90 u/hr           10P 0.650 u/hr               Recommended Postpartum Settings: (Based on previous settings. Can convert back to control IQ postpartum)  Time Basal ICR ISF Active Insulin Time Target   MN 0.60 u/hr 15 65  3h 110   0630 0.90 u/hr           10P 0.650 u/hr             Diabetes management plan during delivery:  - intrapartum plan: Pt requests that she wear her insulin pump during - I advised her to ask MFM if this is possible as not sure [facility] current  policy. She likely could just pre-program her pump for postpartum settings before  and continue on control IQ while delivering as the closed loop system would avoid hypoglycemia  - postpartum: once baby is delivered, pt is to resume her insulin pump with the above adjusted settings. Endocrinology consult can be placed, endo will see the patient during available endo inpatient hours (M-F, 8:30-4)  - If further BG management is needed during off hours, hospitalist will need to be contacted    - TSH stable 2024  - Goal is to get as close to 6% as possible without causing hypoglycemia. Fasting blood sugar goal <95. 1 hour after meal <140. 2 hours after meal <120. A1C goal 6-6.5%  - plan for every Monday BG check ins while pregnant  - gvoke sent (2023)  - pregnancy pump backup plan: Lantus 17u nightly, Humalog ICR 1:9, ISF 1:45>100 (updated 2023)  - postpartum pump backup plan: lantus 15u nightly, humalog ICR 1:15, ISF 1:65 > 140      DM quality metrics:  - Ophtho: no DR, No data recorded Eye Exam shows Diabetic Retinopathy?: No  - BP controlled, on not on antihypertensives  - LDL at goal 2023, defer statin, pregnant  - MAB neg 2023  - Neuropathy/ Foot exam: Last Foot Exam: 23  - CAD: none       Return in about 1 month (around 3/29/2024) for follow up on pump settings.    2024  YEMI Gilbert

## 2024-02-29 NOTE — PATIENT INSTRUCTIONS
Time Basal ICR ISF Active Insulin Time Target   MN 0.60 u/hr 9 46  3h 110   0630 0.90 u/hr           10P 0.650 u/hr               - Check your fasting BG x2 over the weekend - if >95, adjust basal at 10P and MN to 0.70u/hr, do the same test x2, if still not <95, then incr to 0.75u/hr    - consider adjusting active insulin time from 3h to 2.5h    Pregnancy BG goals: A1C 6-6.5%. Fasting blood sugar goal <95. 1 hour after meal <140. 2 hours after meal <120.

## 2024-03-04 ENCOUNTER — TELEPHONE (OUTPATIENT)
Facility: CLINIC | Age: 34
End: 2024-03-04

## 2024-03-04 NOTE — TELEPHONE ENCOUNTER
Uploaded pts Tandem report into [employer] for weekly review.     Per LOV 2/29:  Recommended Postpartum Settings: (Based on previous settings. Can convert back to control IQ postpartum)  Time Basal ICR ISF Active Insulin Time Target   MN 0.60 u/hr 15 65  3h 110   0630 0.90 u/hr           10P 0.650 u/hr

## 2024-03-04 NOTE — TELEPHONE ENCOUNTER
Endocrinology- Monday pregnancy check in re: diabetes control- 3/4/2024 - 21w5d today.     Changes made last check-in: no changes were made last office visit as BG were quite labile  Time Basal ICR ISF Active Insulin Time Target   MN 0.60 u/hr 9 46  3h 110   0630 0.90 u/hr           10P 0.650 u/hr               Assessment/Plan:  Data reviewed. 2hPP not consistently to goal, Hypoglycemia present. She is having low BG and labile BG control particularly on days she overrides her pump to give a correction. I am going to adjust correction factor to match her TDD of 42u-- want her to consistently just use her corrections as suggested and see if BG control is more stable/less hypoglycemia. Also looks like some sensor errors overnight.    Make the following med changes:  Time Basal ICR ISF Active Insulin Time Target   MN 0.60 u/hr 9 46 --> 42 3h --> 2.5h (this change would help her give more frequent corrections and help to not feel like she has to override so often) 110   0630 0.90 u/hr           10P 0.650 u/hr                     No overrides-- her day was much smoother:        Routed to DM educator to communicate plan to patient.

## 2024-03-11 ENCOUNTER — TELEPHONE (OUTPATIENT)
Facility: CLINIC | Age: 34
End: 2024-03-11

## 2024-03-11 NOTE — TELEPHONE ENCOUNTER
Endocrinology- Monday pregnancy check in re: diabetes control- 3/11/2024 - 22w5d today.     Changes made last check-in:  Time Basal ICR ISF Active Insulin Time Target   MN 0.650 u/hr 9 46 --> 42 3h --> 2.5h (this change will help give you more frequent corrections and help to not feel like you have to override so often) 110   0630 0.90 u/hr           10P 0.650 u/hr             Patient's current settings: (some self adjustments to basal, ICR and ISF made)  Time Basal ICR ISF Active Insulin Time Target   MN 0.650 u/hr 8 43 2.5h  110   0630 1.0 u/hr           10P 0.750 u/hr             TDD 45.29u    Assessment/Plan:  Data reviewed.  It appears (below) most lows are occurring after meal bolus - she is coming down GREAT when using correction doses. Seems that the AIT change helps. Recommend using a gentler carb ratio and focus on behaviors around dosing-- make sure to prebolus 10-15min beforehand  Make the following med changes:  Time Basal ICR ISF Active Insulin Time Target   MN 0.650 --> 0.70 u/hr 8 --> 10 43 2.5h  110   0630 1.0 u/hr           10P 0.750 u/hr               Routed to DM educator to communicate plan to patient.

## 2024-03-11 NOTE — TELEPHONE ENCOUNTER
Uploaded pts Tandem report into [employer] for weekly review. Routing to APN.     Per last weekly check in 3/4/24:  Make the following med changes:  Time Basal ICR ISF Active Insulin Time Target   MN 0.60 u/hr 9 46 --> 42 3h --> 2.5h (this change will help give you more frequent corrections and help to not feel like you have to override so often) 110   0630 0.90 u/hr           10P 0.650 u/hr

## 2024-03-18 ENCOUNTER — TELEPHONE (OUTPATIENT)
Facility: CLINIC | Age: 34
End: 2024-03-18

## 2024-03-18 NOTE — TELEPHONE ENCOUNTER
Continuity of Care Form    Patient Name: Bettye Peterson   :  1947  MRN:  [id number]    Admit date:  3/18/2024  Discharge date:  ***    Code Status Order: Prior   Advance Directives:     Admitting Physician:  No admitting provider for patient encounter.  PCP: Sheri Fam MD    Discharging Nurse: ***  Discharging Hospital Unit/Room#: [id number]  Discharging Unit Phone Number: ***    Emergency Contact:   Extended Emergency Contact Information  Primary Emergency Contact: Flex Peterson   [facility]  Home Phone: [phone]  Mobile Phone: [phone]  Relation: Child  Secondary Emergency Contact: Kayla Peterson  Home Phone: [phone]  Mobile Phone: [phone]  Relation: Child    Past Surgical History:  Past Surgical History:   Procedure Laterality Date    ANKLE SURGERY      left ankle    BLADDER REPAIR      COLONOSCOPY N/A 2/15/2024    COLONOSCOPY POLYPECTOMY SNARE/COLD BIOPSY performed by Lucas Blair MD at [facility] ENDOSCOPY    HYSTERECTOMY (CERVIX STATUS UNKNOWN)      LUNG REMOVAL, PARTIAL Left 2018    PLANTAR FASCIA SURGERY      [facility]  [facility]    BRONCHOSCOPY BRUSHINGS performed by Og Couch [facility]    [facility]TX [facility] PERPH LES  2018    BRONCHOSCOPY ULTRASOUND performed by Og Couch MD at [facility] ENDOSCOPY    MA BRONCHOSCOPY BRONCHIAL/ENDOBRNCL BX 1+ SITES  2018    BRONCHOSCOPY BIOPSY BRONCHUS performed by Og Couch MD at [facility] ENDOSCOPY    MA BRONCHOSCOPY W/TRANSBRONCHIAL LUNG BX 1 LOBE N/A 2018    BRONCHOSCOPY/TRANSBRONCHIAL NEEDLE BIOPSY performed by Og Couch MD at [facility] ENDOSCOPY    UPPER GASTROINTESTINAL ENDOSCOPY N/A 2/15/2024    EGD ESOPHAGOGASTRODUODENOSCOPY WITH BIOPSY performed by Lucas Blair MD at [facility] ENDOSCOPY    WRIST FRACTURE SURGERY Left 2023       Immunization History:   Immunization History   Administered Date(s)  Monday pregnancy check in- 12/26/2023       Patient currently taking:  MN:  basal 0.50   ICR 1:14; ISF 56  target 110  6:30 am: basal 0.95 --> 0.70 u/hr  ICR 1:14; ISF 56  target 110  10 pm: basal  0.65 u/hr   ICR 1:14 ; ISF 56  target 110     Avg TDD 35.52u    As of this morning, she changed her 10pm dose to 0.65u/hr    Dexcom G6 CGM, Tandem T-slim, and with phone (connected to clinic profile) data reviewed. Provider assessment:  FBG not to goal- overnight lows,  2hPP not to goal- sometimes prolonged hyperglycemia, then overcorrects and drops low  Frequent Hypoglycemia present - mostly happening after stacking of boluses; though suspect she may be overbasalized    Plan:  Make the following med changes:  Suspect some over-basalization during the day. Phoned pt to discuss. She had to cancel her appt tomorrow because she's sick. Will adjust basal rates; [facility] sent. If still high later this week, then can adjust ISF, advised to try avoiding overriding the pump using manual boluses as this seems to contribute to low BG as well      MN:  basal 0.50   ICR 1:14; ISF 56  target 110  6:30 am: basal 0.95 --> 0.70 u/hr  ICR 1:14; ISF 56  target 110  10 pm: basal  0.65 u/hr   ICR 1:14 ; ISF 56  target 110     Closing encounter, plan discussed via phone.   YEMI Ervin

## 2024-03-18 NOTE — TELEPHONE ENCOUNTER
Uploaded pts Tandem report into [employer] for weekly review. Routing to APN for review.     Per last weekly check in 3/11:   Make the following med changes:  Time Basal ICR ISF Active Insulin Time Target   MN 0.650 --> 0.70 u/hr 8 --> 10 43 2.5h  110   0630 1.0 u/hr           10P 0.750 u/hr

## 2024-03-18 NOTE — TELEPHONE ENCOUNTER
Endocrinology- Monday pregnancy check in re: diabetes control- 3/18/2024 - 23w5d today.     Changes made last check-in:  Time Basal ICR ISF Active Insulin Time Target   MN 0.650 --> 0.70 u/hr 8 --> 10 43 2.5h  110   0630 1.0 u/hr           10P 0.750 u/hr               Assessment/Plan:  Current pump settings:  Time Basal ICR ISF Active Insulin Time Target   MN 0.650  u/hr 8  43 2.5h  110   0630 1.0 u/hr           10P 0.70 u/hr             Data reviewed. FBG not to goal, 2hPP not consistently to goal -- she has some days with great FBG control, others are higher than goal. She is still needing to override to give corrections- can likely be more aggressive on ISF. Needs to prebolus consistently as the post-meal highs seem to be from bolusing right at meal start.    Time Basal ICR ISF Active Insulin Time Target   MN 0.650  u/hr 8  43 --> 39 2.5h  110   0630 1.0 u/hr           10P 0.70 u/hr               Routed to DM educator to communicate plan to patient.

## 2024-03-20 ENCOUNTER — OFFICE VISIT (OUTPATIENT)
Dept: PERINATAL CARE | Facility: HOSPITAL | Age: 34
End: 2024-03-20
Attending: OBSTETRICS & GYNECOLOGY
Payer: COMMERCIAL

## 2024-03-20 VITALS
HEIGHT: 66 IN | SYSTOLIC BLOOD PRESSURE: 117 MMHG | BODY MASS INDEX: 23.78 KG/M2 | HEART RATE: 79 BPM | DIASTOLIC BLOOD PRESSURE: 70 MMHG | WEIGHT: 148 LBS

## 2024-03-20 DIAGNOSIS — E10.9 TYPE 1 DIABETES MELLITUS (HCC): ICD-10-CM

## 2024-03-20 DIAGNOSIS — E10.9 TYPE 1 DIABETES MELLITUS WITHOUT COMPLICATION (HCC): Primary | ICD-10-CM

## 2024-03-20 DIAGNOSIS — O98.512 COVID-19 AFFECTING PREGNANCY IN SECOND TRIMESTER (HCC): ICD-10-CM

## 2024-03-20 DIAGNOSIS — O34.219 HISTORY OF CESAREAN DELIVERY AFFECTING PREGNANCY (HCC): ICD-10-CM

## 2024-03-20 DIAGNOSIS — U07.1 COVID-19 AFFECTING PREGNANCY IN SECOND TRIMESTER (HCC): ICD-10-CM

## 2024-03-20 PROCEDURE — 76827 ECHO EXAM OF FETAL HEART: CPT

## 2024-03-20 PROCEDURE — 93325 DOPPLER ECHO COLOR FLOW MAPG: CPT

## 2024-03-20 PROCEDURE — 76825 ECHO EXAM OF FETAL HEART: CPT | Performed by: OBSTETRICS & GYNECOLOGY

## 2024-03-20 NOTE — PROGRESS NOTES
Outpatient Maternal-Fetal Medicine Consultation    Dear Dr. Perales    Thank you for requesting ultrasound evaluation and maternal fetal medicine consultation on your patient Tristin Gonzalez.  As you are aware she is a 33 year old female  with a lobo pregnancy and an Estimated Date of Delivery: 7/10/24.  A maternal-fetal medicine f/u is today. Her prenatal records and labs were reviewed.    Asks if pump will need to be off or will she be able to manage her pump during .    Having lower abdominal discomfort and feels popping in her hip or leg area when she tries to turn over in bed.  ROS    HISTORY  OB History    Para Term  AB Living   3 2 2   0     SAB IAB Ectopic Multiple Live Births                  # Outcome Date GA Lbr Peter/2nd Weight Sex Delivery Anes PTL Lv   3 Current            2 Term            1 Term                Allergies:  No Known Allergies   Current Meds:  Current Outpatient Medications   Medication Sig Dispense Refill    insulin lispro (HUMALOG) 100 UNIT/ML Injection Solution Used to fill insulin pump with max 70u daily.  3-month supply 70 mL 0    prenatal vitamin with DHA 27-0.8-228 MG Oral Cap Take 1 capsule by mouth daily.      cholecalciferol (VITAMIN D3) 125 MCG (5000 UT) Oral Cap Take 1 capsule (5,000 Units total) by mouth daily.      aspirin 81 MG Oral Tab EC Take 1 tablet (81 mg total) by mouth daily.      ferrous sulfate 325 (65 FE) MG Oral Tab EC Take 1 tablet (325 mg total) by mouth every other day.      Glucose Blood (CONTOUR NEXT TEST) In Vitro Strip Use to test BG up to four times daily. 400 strip 3    Continuous Blood Gluc Sensor (DEXCOM G7 SENSOR) Does not apply Misc 1 each Every 10 days. 9 each 1    glucagon (GVOKE HYPOPEN 2-PACK) 1 MG/0.2ML Subcutaneous SUBQ injection Inject 0.2 mL (1 mg total) into the skin as needed. Patient trained on Gvoke. No substitutions 0.4 mL 1    Insulin Pen Needle (BD PEN NEEDLE CARMEN U/F) 32G X 4 MM Does not apply Misc  Inject 1 Pen into the skin daily. Use as insulin pump back up      insulin glargine (LANTUS SOLOSTAR) 100 UNIT/ML Subcutaneous Solution Pen-injector Inject 17 Units into the skin nightly. (Patient not taking: Reported on 3/20/2024)      busPIRone 5 MG Oral Tab Take 2 tablets (10 mg total) by mouth daily. (Patient not taking: Reported on 3/20/2024)      OneTouch Delica Lancets 33G Does not apply Misc 1 Lancet by Finger stick route As Directed.      Glucose Blood (ONETOUCH VERIO) In Vitro Strip 1 each by Other route 4 (four) times daily.      Microlet Lancets Does not apply Misc 1 each by Other route TID & HS.          HISTORY:  Past Medical History:   Diagnosis Date    Insulin pump in place     Type 1 diabetes mellitus (HCC)       Past Surgical History:   Procedure Laterality Date    HC  SECTION LEVEL I  2020    HC  SECTION LEVEL I  2017    WISDOM TEETH REMOVED        Family History   Problem Relation Age of Onset    Other (multiple sclerosis) Mother     Other (Hemochromatosis) Father     Asthma Brother     Colon Cancer Maternal Grandmother     Other (CABG) Maternal Grandfather     Dementia Maternal Grandfather     Other (Coronary artery disease) Maternal Grandfather     Colon Cancer Paternal Grandmother 70    Blood Cancer Paternal Grandfather 70        Lymphoma?      Social History     Socioeconomic History    Marital status:    Tobacco Use    Smoking status: Never    Smokeless tobacco: Never   Substance and Sexual Activity    Alcohol use: Yes     Alcohol/week: 1.0 standard drink of alcohol     Types: 1 Glasses of wine per week     Comment: 1 per week occasionally    Drug use: Never    Sexual activity: Yes          PHYSICAL EXAMINATION:  /70 (BP Location: Right arm, Patient Position: Sitting, Cuff Size: adult)   Pulse 79   Ht 5' 6\" (1.676 m)   Wt 148 lb (67.1 kg)   BMI 23.89 kg/m²   Physical Exam  Constitutional:       Appearance: Normal appearance.   Abdominal:       Palpations: Abdomen is soft.      Tenderness: There is no abdominal tenderness.   Neurological:      Mental Status: She is alert.   Psychiatric:         Mood and Affect: Mood normal.         Behavior: Behavior normal.         OBSTETRIC ULTRASOUND  The patient had a fetal echocardiogram today which revealed normal fetal cardiac anatomy.  FETAL ECHOCARDIOGRAM:    Single IUP in breech presentation.    Placenta is posterior.   A 3 vessel cord is noted.  Cardiac activity is present at 137 bpm  MVP is 5.1 cm .      A transabdominal 2D Doppler, fetal echocardiogram is performed. There is a four-chamber heart of appropriate cardiac dimensions. There is situs solitus with levocardia. Intracardiac connection appear to be normal.  The  atrioventricular valves appear normal. There is no evidence of pericardial or pleural effusion. The A-V conduction is one to one and the heart rate is appropriate for gestational age. No evidence of fetal arrhythmias is seen during today's study. There is a right to left shunting across the patent laci ovale. There is a normal appearance of the aorta and branching pattern of the head vessels.    Normal fetal Doppler interrogations    There appears to be a structurally  normal fetal heart and rhythm. The patient was made aware of the limitations of the fetal heart study: malformations such as but not limiting to minor valve , VSD, anomalous pulmonary venus return, or coarctation of the aorta maybe missed. I discussed the results with the patient.        See PACS/Imaging Tab For Complete Ultrasound Report  I interpreted the results and reviewed them with the patient.    DISCUSSION  During her visit we discussed and reviewed the following issues:  ADVANCED MATERNAL AGE    Background  I reviewed with the patient that pregnancies in women of advanced maternal age (35 or older at delivery) are associated with elevated risks. Specifically, there is a higher rate of:  Fetal  malformations  Preeclampsia  Gestational diabetes  Intrauterine fetal death   Please see previous [facility] detailed discussion.     DIABETES MELLITUS  Diabnosed age 6.  One episode on DKA possible.      She is on a pump.  Managed by Endo.  Diabetes type diagnosed at age 6.  Only has had 1 episode potentially of DKA.  She is overall easily controlled.  No retinopathy.  No kidney disease from the diabetes.     We discussed the risks associated with pregestational diabetes.  There is an increased risk of congenital malformations, fetal growth disturbances, preeclampsia, spontaneous  and intrauterine fetal demise (IUFD).  Infants of diabetic mothers (IDMs) are also at increased risk for hypoglycemia and hyperbilirubinemia.   Please see previous [facility] detailed discussion. Managed by endo.    Prevention of Preeclampsia   Please see previous [facility] detailed discussion.           Despite extensive clinical and epidemiologic research, the etiology of placental abruption is heterogeneous and speculative. Risk factors for placental abruption can be classified as those associated with an acute etiology;  medical and obstetrical risk factors; and  maternal sociodemographic and behavioral risk factors.  Please see previous [facility] detailed discussion.         There are no laboratory screening tests that are predictive of an increased risk for abruption. Testing women with a history of abruption for antiphospholipid antibodies or an inherited thrombophilia is not indicated unless other factors are present that could be associated with thrombophilia.     She has type 1 diabetes and has been delivered at 35 weeks for an abruption and then was delivered more in her last pregnancy at 37 weeks for elevated blood pressure.  Therefore I recommended that she be delivered at 38 weeks this pregnancy .    Goal will be to have the pump on before and after surgery.  During surgery, it is possible pump may not be able to be worn. Dexcom cannot be  worn during surgery.         Discomforts of pregnancy discussed.  We discussed using heat packs, shoes with arch support, pregnancy support belt, compression stockings, massage therapy, and physical therapy as options to manage the discomforts.    IMPRESSION:  IUP at 24w0d   AMA --NIPT low risk, declines invasive testing   Type I diabetes  History abruption at 35 weeks in Preg #1  History elevated BP and delivery 37 weeks Preg #2  Prior  x 2    RECOMMENDATIONS:  Continue care with Dr. Perales  Check glucoses 4x/day and send to Endo weekly  Monthly growth US in third trimester with BPP at or beyond 32 weeks  Weekly NST at    32 wks / twice weekly at  34  wks      Hgb A1C q trimester  Baseline urine P/C ratio , CMP or AST/ALT/creatinine, TSH  EKG     Ophthalmology exam      Insulin as above  Aspirin 81-120mg daily   Delivery at 38 weeks          Thank you for allowing me to participate in the care of your patient.  Please do not hesitate to contact me if additional questions or concerns arise.      Randi Oswald M.D.    30 minutes spent in review of records, patient consultation, documentation and coordination of care.  The relevant clinical matter(s) are summarized above.     Note to patient and family  The 21st Century Cures Act makes medical notes available to patients in the interest of transparency.  However, please be advised that this is a medical document.  It is intended as [id number] communication.  It is written and medical language may contain abbreviations or verbiage that are technical and unfamiliar.  It may appear blunt or direct.  Medical documents are intended to carry relevant information, facts as evident, and the clinical opinion of the practitioner.

## 2024-03-25 ENCOUNTER — TELEPHONE (OUTPATIENT)
Facility: CLINIC | Age: 34
End: 2024-03-25

## 2024-03-25 NOTE — TELEPHONE ENCOUNTER
Uploaded pts Tandem report into [employer] for weekly review.    Per last weekly check in 3/18:   FBG not to goal, 2hPP not consistently to goal -- she has some days with great FBG control, others are higher than goal. She is still needing to override to give corrections- can likely be more aggressive on ISF. Needs to prebolus consistently as the post-meal highs seem to be from bolusing right at meal start.     Time Basal ICR ISF Active Insulin Time Target   MN 0.650  u/hr 8  43 --> 39 2.5h  110   0630 1.0 u/hr           10P 0.70 u/hr

## 2024-03-28 ENCOUNTER — OFFICE VISIT (OUTPATIENT)
Facility: CLINIC | Age: 34
End: 2024-03-28
Payer: COMMERCIAL

## 2024-03-28 VITALS — OXYGEN SATURATION: 98 % | SYSTOLIC BLOOD PRESSURE: 118 MMHG | HEART RATE: 95 BPM | DIASTOLIC BLOOD PRESSURE: 70 MMHG

## 2024-03-28 DIAGNOSIS — E10.9 TYPE 1 DIABETES MELLITUS WITHOUT COMPLICATION (HCC): ICD-10-CM

## 2024-03-28 DIAGNOSIS — O24.012: ICD-10-CM

## 2024-03-28 DIAGNOSIS — Z96.41 INSULIN PUMP IN PLACE: Primary | ICD-10-CM

## 2024-03-28 DIAGNOSIS — Z46.81 INSULIN PUMP TITRATION: ICD-10-CM

## 2024-03-28 PROCEDURE — 3074F SYST BP LT 130 MM HG: CPT

## 2024-03-28 PROCEDURE — 3078F DIAST BP <80 MM HG: CPT

## 2024-03-28 PROCEDURE — 99214 OFFICE O/P EST MOD 30 MIN: CPT

## 2024-03-28 RX ORDER — INSULIN GLARGINE 100 [IU]/ML
17 INJECTION, SOLUTION SUBCUTANEOUS NIGHTLY
COMMUNITY
Start: 2024-03-28

## 2024-03-28 NOTE — PROGRESS NOTES
Endocrinology Clinic Note    Name: Tristin Gonzalez  Date: 3/28/2024      HISTORY OF PRESENT ILLNESS   Tristin Gonzalez is a 33 year old female with PMHx significant for type 1 diabetes mellitus who presents for T1DM management.    Initial HPI consult in March 2023:   A1C 6.4%  Diagnosed age: 5y/o  Pt denies hx of hospitalization for DM and/or pancreatitis.  Family history of DM: None  Only has had DKA 1x possibly- unsure  Establishing care to be closer to home, prev pt of Dr Perrin in [place]  Using Tandem pump in Basal[employer], is due for pump upgrade but needed updated visit note    Interim hx  June 2023- A1C 6.4% today. Upgraded to control IQ pump in April 2023, liking it so far! Did have to change her settings from LOV, we changed from 100-->55 and was constantly snacking w/ this so incr'd back up to middle ground of 75. She does note that once she hits >200 her insulin resistance seems worse, otherwise very insulin sensitive. No pattern to hypo events, occurring very rarely.    Sept 2023- Last A1c value was 6.2% done 9/15/2023.  Current DM meds: Tandem pump settings (below)   Doing well overall, liking the pump changes we made. Is often manually entering carbs instead of using carb ratio; very busy w/ work and kids  Starting taking iron supplements, has more energy now. Also recently dx'd with ADHD, tried anxiety meds but wasn't a good fit    Nov 2023-  Newly pregnant, LMP 10/4/23, approx 8 wks pregnant, saw OB yesterday ([facility])  Has noticed higher BG readings the past few weeks, using more insulin  Previous pregnancy used Control IQ, prior to that was on medtronic pump    January 2024- w/ endo APN, re: DM. Last A1c value was 5.7% done 1/31/2024. Approx 17 wks pregnany.   Changes made at LOV include: pump settings continue to be adjusted qMonday in the setting of pregnancy.  Subjective updates since last office visit: Feeling well, noting more insulin resistance but also thinks she may have had a  faulty site. Some occasional lows. Sometimes overrides the suggested correction because feels it doesn't give enough. Has been feeling tired lately- on vit D, iron supplement, and started baby aspirin at 12 weeks. Unfortunately a lot of ongoing stressors, had a loss in the family so missed an appt.   Blood glucose updates: Checking with CGM, data below  Current DM meds at visit: Tandem Tslim in MANUAL mode, Dexcom G6    February 2024- w/ endo APN. Last A1c value was 5.7% done 1/31/2024.  Currently 21 weeks, had 20 wk scan & baby girl looked good. She is hoping to make it to 38 wks, planning on csection. Saw MFM. She is hoping to wear pump during Csection/self manage BG while admitted. States overall her energy is good  Current DM meds at visit: Tandem Tslim in MANUAL mode, Dexcom G6    March 2024-w/ [facility] APN. Last A1c value was 5.7% done 1/31/2024. 25w1d today.   Had 24 wk check in last week. Switched from dexcom G6 --> G7; having spotty readings.  For example, random BG on fingerstick 183, on dexcom it's saying its 204 with two up arrows.  Otherwise, thinks carb ratio is going well. Curious if she can wear pump while delivering.  Current DM meds at visit: Tandem Tslim + MANUAL mode + Dexcom G7  Time Basal ICR ISF Active Insulin Time Target   MN 0.750  u/hr 8  40 2.5h  110   0630 1.0 u/hr           10P 0.80 u/hr           TDD 50u    Continuous Glucose Monitoring Interpretation  Tristin Gonzalez has undergone continuous glucose monitoring with their CGM.  The blood glucose tracings were evaluated for two weeks prior to office visit.   Blood glucose tracings demonstrated areas of hyperglycemia after using ISF calculations  There were occasional hypoglycemia, particularly no specific pattern but some appear to be false sensor readings .             Previously trialed/failed: prev on medtronic pump    REVIEW OF SYSTEMS  Ten point review of systems has been performed and is otherwise negative and/or non-contributory, except  as described above.     History/Other:   History  Medications:     Current Outpatient Medications:     insulin glargine (LANTUS SOLOSTAR) 100 UNIT/ML Subcutaneous Solution Pen-injector, Inject 17 Units into the skin nightly. To use in the event of pump failure., Disp: , Rfl:     insulin lispro (HUMALOG) 100 UNIT/ML Injection Solution, Used to fill insulin pump with max 70u daily.  3-month supply, Disp: 70 mL, Rfl: 0    prenatal vitamin with DHA 27-0.8-228 MG Oral Cap, Take 1 capsule by mouth daily., Disp: , Rfl:     cholecalciferol (VITAMIN D3) 125 MCG (5000 UT) Oral Cap, Take 1 capsule (5,000 Units total) by mouth daily., Disp: , Rfl:     aspirin 81 MG Oral Tab EC, Take 1 tablet (81 mg total) by mouth daily., Disp: , Rfl:     ferrous sulfate 325 (65 FE) MG Oral Tab EC, Take 1 tablet (325 mg total) by mouth every other day., Disp: , Rfl:     Glucose Blood (CONTOUR NEXT TEST) In Vitro Strip, Use to test BG up to four times daily., Disp: 400 strip, Rfl: 3    Continuous Blood Gluc Sensor (DEXCOM G7 SENSOR) Does not apply Misc, 1 each Every 10 days., Disp: 9 each, Rfl: 1    glucagon (GVOKE HYPOPEN 2-PACK) 1 MG/0.2ML Subcutaneous SUBQ injection, Inject 0.2 mL (1 mg total) into the skin as needed. Patient trained on Gvoke. No substitutions, Disp: 0.4 mL, Rfl: 1    Insulin Pen Needle (BD PEN NEEDLE CARMEN U/F) 32G X 4 MM Does not apply Misc, Inject 1 Pen into the skin daily. Use as insulin pump back up, Disp: , Rfl:     OneTouch Delica Lancets 33G Does not apply Misc, 1 Lancet by Finger stick route As Directed., Disp: , Rfl:     Glucose Blood (ONETOUCH VERIO) In Vitro Strip, 1 each by Other route 4 (four) times daily., Disp: , Rfl:     Microlet Lancets Does not apply Misc, 1 each by Other route TID & HS., Disp: , Rfl:     busPIRone 5 MG Oral Tab, Take 2 tablets (10 mg total) by mouth daily. (Patient not taking: Reported on 3/20/2024), Disp: , Rfl:      Allergies:   No Known Allergies    Social History:   Social History      Socioeconomic History    Marital status:    Tobacco Use    Smoking status: Never    Smokeless tobacco: Never   Substance and Sexual Activity    Alcohol use: Yes     Alcohol/week: 1.0 standard drink of alcohol     Types: 1 Glasses of wine per week     Comment: 1 per week occasionally    Drug use: Never    Sexual activity: Yes       Medical History:   Reviewed    Surgical history:   Past Surgical History:   Procedure Laterality Date    HC  SECTION LEVEL I  2020      SECTION LEVEL I  2017    WISDOM TEETH REMOVED         Objective:   Vitals:  Vitals:    24 1110   BP: 118/70   Pulse: 95   SpO2: 98%         Physical exam:  General Appearance:  alert, well developed, in no acute distress  Eyes:  normal conjunctivae, sclera  Respiratory:  breathing comfortably  Musculoskeletal:  normal muscle strength and tone  Skin:  normal moisture and skin texture  Hair & Nails:  normal scalp hair     Psychiatric:  oriented to time, self, and place  Neuro:  sensory grossly intact and motor grossly intact    Labs/Imaging: Pertinent imaging reviewed.    Assessment & Plan:     ICD-10-CM    1. Type 1 diabetes mellitus without complication (HCC)  E10.9 insulin glargine (LANTUS SOLOSTAR) 100 UNIT/ML Subcutaneous Solution Pen-injector          A1C 5.7% in 2024, to goal <6.5%. BG generally to target- FBG improving, 2hPP not consistently to target. Will adjust ISF and overnight basal.    No changes to settings today:  Tandem TSlim pump- Manual mode-- Using Humalog U100 + Dexcom G7  Time Basal ICR ISF Active Insulin Time Target   MN 0.750  u/hr --> 0.80u/hr 8  40 --> 36 2.5h  110   0630 1.0 u/hr           10P 0.80 u/hr --> 0.85u/hr           --> If still high 2hPP after 2-3 days, adjust ISF --> 34    Recommended Postpartum Settings: (Based on previous settings. Can convert back to control IQ postpartum)  Time Basal ICR ISF Active Insulin Time Target   MN 0.60 u/hr 15 65  3h 110   0630 0.90 u/hr            10P 0.650 u/hr             Diabetes management plan during delivery:  - intrapartum plan: Pt requests that she wear her insulin pump during -  she likely can discontinue pump right before  and re-apply after delivery, with plan to check BG as needed using accuchek during delivery- will reach out to MFM team to eval what is typically done intrapartum.   - postpartum: once baby is delivered, pt is to resume her insulin pump with the above adjusted \"postpartum\" settings. Endocrinology consult can be placed while she is inpatient for postpartum insulin pump management, endo will see the patient during available endo inpatient hours (M-F, 8:30-4)  - If further BG management is needed during off hours, hospitalist will need to be contacted    - MFM contacted today re: intrapartum plan via staff message  - TSH stable 2024  - Goal is to get as close to 6% as possible without causing hypoglycemia. Fasting blood sugar goal <95. 1 hour after meal <140. 2 hours after meal <120. A1C goal 6-6.5%  - plan for every Monday BG check ins while pregnant  - gvoke sent (2023)  - pregnancy pump backup plan: Lantus 17u nightly, Humalog ICR 1:9, ISF 1:45>100 (updated 2023)  - postpartum pump backup plan: lantus 15u nightly, humalog ICR 1:15, ISF 1:65 > 140      DM quality metrics:  - Ophtho: no DR, No data recorded No data recorded  - BP controlled, on not on antihypertensives  - LDL at goal 2023, defer statin, pregnant  - MAB neg 2023  - Neuropathy/ Foot exam: No data recorded  - CAD: none       Return in about 1 month (around 2024) for DM follow up, weekly check in via [employer] during pregnancy.    3/28/2024  YEMI Gilbert

## 2024-03-28 NOTE — PATIENT INSTRUCTIONS
Time Basal ICR ISF Active Insulin Time Target   MN 0.750  u/hr --> 0.80u/hr 8  40 --> 36 2.5h  110   0630 1.0 u/hr           10P 0.80 u/hr --> 0.85u/hr             --> give this a few days- if you're still noticing higher numbers after correction, then move correction factor to 34

## 2024-04-01 ENCOUNTER — TELEPHONE (OUTPATIENT)
Facility: CLINIC | Age: 34
End: 2024-04-01

## 2024-04-01 NOTE — TELEPHONE ENCOUNTER
Patient Tandem report uploaded to [employer] for weekly review.     Per LOV 3/28:  No changes to settings today:  Tandem TSlim pump- Manual mode-- Using Humalog U100 + Dexcom G7  Time Basal ICR ISF Active Insulin Time Target   MN 0.750  u/hr --> 0.80u/hr 8  40 --> 36 2.5h  110   0630 1.0 u/hr           10P 0.80 u/hr --> 0.85u/hr           --> If still high 2hPP after 2-3 days, adjust ISF --> 34

## 2024-04-01 NOTE — TELEPHONE ENCOUNTER
Endocrinology- Monday pregnancy check in re: diabetes control- 4/1/2024 - 25w5d today.     Changes made last check-in:  Time Basal ICR ISF Active Insulin Time Target   MN 0.750  u/hr --> 0.80u/hr 8  40 --> 36 2.5h  110   0630 1.0 u/hr           10P 0.80 u/hr --> 0.85u/hr               Assessment/Plan:  Data reviewed- overall things look more stable (especially after her basal rate change to 0.80u/hr on Saturday- things look more stable overnight) & less hypo events after correction doses which is ideal. Will continue current settings for now:    Time Basal ICR ISF Active Insulin Time Target   MN 0.850u/hr 8  36 2.5h  110   0630 1.0 u/hr           10P 0.80 u/hr               Routed to DM educator to communicate plan to patient.

## 2024-04-03 NOTE — TELEPHONE ENCOUNTER
Sent a MCM to the patient about changing basal rates or ICR if she is noticing more resistance  No changes needed further  Closing this encounter

## 2024-04-08 ENCOUNTER — TELEPHONE (OUTPATIENT)
Facility: CLINIC | Age: 34
End: 2024-04-08

## 2024-04-08 NOTE — TELEPHONE ENCOUNTER
Patient Tandem report uploaded to [employer] for weekly review.     Per last TE notes 4/01/24:  Will continue current settings for now:     Time Basal ICR ISF Active Insulin Time Target   MN 0.850u/hr 8  36 2.5h  110   0630 1.0 u/hr           10P 0.80 u/hr

## 2024-04-12 ENCOUNTER — TELEPHONE (OUTPATIENT)
Facility: CLINIC | Age: 34
End: 2024-04-12

## 2024-04-12 NOTE — TELEPHONE ENCOUNTER
4/12/24-Received via fax from Dr. Sharon Escalante of [facility] a note regarding a diabetic eye exam dated 4/12/24.  Given to MA to review.

## 2024-04-15 ENCOUNTER — TELEPHONE (OUTPATIENT)
Facility: CLINIC | Age: 34
End: 2024-04-15

## 2024-04-15 NOTE — TELEPHONE ENCOUNTER
Last check in, kept settings the same; pt self adjusted her ICR 8--> 7 and ISF 36 --> 35 and 0630 basal from 1.0 --> 1.10u/hr since last check in:  Time Basal ICR ISF Active Insulin Time Target   MN 0.850u/hr 7 35 2.5h  110   0630 1.10 u/hr           10P 0.80 u/hr               Tandem reports reviewed. She is using ~49.7u/day-- current settings appear appropriate.  No changes today- will route to DCES to evaluate if there are any other areas of improvement (ie treated the low then had a subsequent high from pausing pump-- maybe did not need to pause pump 4/13 and 4/14?  ICR might be too aggressive if dropping her low).      Notes:  She had a rise in BG this AM, which is not the trend otherwise the past few days:      Several overrides + lows:      Overriding pump, no subsequent lows (initial low after a carb bolus; suspended pump with subsequent high):

## 2024-04-17 ENCOUNTER — MED REC SCAN ONLY (OUTPATIENT)
Facility: CLINIC | Age: 34
End: 2024-04-17

## 2024-04-17 ENCOUNTER — OFFICE VISIT (OUTPATIENT)
Dept: PERINATAL CARE | Facility: HOSPITAL | Age: 34
End: 2024-04-17
Attending: OBSTETRICS & GYNECOLOGY
Payer: COMMERCIAL

## 2024-04-17 VITALS
SYSTOLIC BLOOD PRESSURE: 126 MMHG | HEART RATE: 80 BPM | WEIGHT: 152 LBS | HEIGHT: 66 IN | DIASTOLIC BLOOD PRESSURE: 76 MMHG | BODY MASS INDEX: 24.43 KG/M2

## 2024-04-17 DIAGNOSIS — O09.523 MULTIGRAVIDA OF ADVANCED MATERNAL AGE IN THIRD TRIMESTER (HCC): ICD-10-CM

## 2024-04-17 DIAGNOSIS — O09.522 MULTIGRAVIDA OF ADVANCED MATERNAL AGE IN SECOND TRIMESTER (HCC): ICD-10-CM

## 2024-04-17 DIAGNOSIS — E10.9 TYPE 1 DIABETES MELLITUS (HCC): Primary | ICD-10-CM

## 2024-04-17 DIAGNOSIS — U07.1 COVID-19 AFFECTING PREGNANCY IN SECOND TRIMESTER (HCC): ICD-10-CM

## 2024-04-17 DIAGNOSIS — E10.9 TYPE 1 DIABETES MELLITUS (HCC): ICD-10-CM

## 2024-04-17 DIAGNOSIS — O24.013 PRE-EXISTING TYPE 1 DIABETES MELLITUS DURING PREGNANCY IN THIRD TRIMESTER (HCC): ICD-10-CM

## 2024-04-17 DIAGNOSIS — E10.9 TYPE 1 DIABETES MELLITUS WITHOUT COMPLICATION (HCC): ICD-10-CM

## 2024-04-17 DIAGNOSIS — O98.512 COVID-19 AFFECTING PREGNANCY IN SECOND TRIMESTER (HCC): ICD-10-CM

## 2024-04-17 PROCEDURE — 76819 FETAL BIOPHYS PROFIL W/O NST: CPT

## 2024-04-17 PROCEDURE — 76816 OB US FOLLOW-UP PER FETUS: CPT | Performed by: OBSTETRICS & GYNECOLOGY

## 2024-04-17 NOTE — PROGRESS NOTES
Outpatient Maternal-Fetal Medicine Consultation    Dear Dr. Perales    Thank you for requesting ultrasound evaluation and maternal fetal medicine consultation on your patient Tristin Gonzalez.  As you are aware she is a 33 year old female  with a olbo pregnancy and an Estimated Date of Delivery: 7/10/24.  A maternal-fetal medicine f/u is today. Her prenatal records and labs were reviewed.    Tearful as her 8 yo dog was diagnosed with cancer and will need to be put down this week.  ROS    HISTORY  OB History    Para Term  AB Living   3 2 2   0     SAB IAB Ectopic Multiple Live Births                  # Outcome Date GA Lbr Peter/2nd Weight Sex Type Anes PTL Lv   3 Current            2 Term            1 Term                Allergies:  No Known Allergies   Current Meds:  Current Outpatient Medications   Medication Sig Dispense Refill    insulin lispro (HUMALOG) 100 UNIT/ML Injection Solution Used to fill insulin pump with max 70u daily.  3-month supply 70 mL 0    prenatal vitamin with DHA 27-0.8-228 MG Oral Cap Take 1 capsule by mouth daily.      cholecalciferol (VITAMIN D3) 125 MCG (5000 UT) Oral Cap Take 1 capsule (5,000 Units total) by mouth daily.      aspirin 81 MG Oral Tab EC Take 1 tablet (81 mg total) by mouth daily.      ferrous sulfate 325 (65 FE) MG Oral Tab EC Take 1 tablet (325 mg total) by mouth every other day.      insulin glargine (LANTUS SOLOSTAR) 100 UNIT/ML Subcutaneous Solution Pen-injector Inject 17 Units into the skin nightly. To use in the event of pump failure.      Glucose Blood (CONTOUR NEXT TEST) In Vitro Strip Use to test BG up to four times daily. 400 strip 3    Continuous Blood Gluc Sensor (DEXCOM G7 SENSOR) Does not apply Misc 1 each Every 10 days. 9 each 1    glucagon (GVOKE HYPOPEN 2-PACK) 1 MG/0.2ML Subcutaneous SUBQ injection Inject 0.2 mL (1 mg total) into the skin as needed. Patient trained on Gvoke. No substitutions 0.4 mL 1    Insulin Pen Needle (BD PEN  NEEDLE CARMEN U/F) 32G X 4 MM Does not apply Misc Inject 1 Pen into the skin daily. Use as insulin pump back up      busPIRone 5 MG Oral Tab Take 2 tablets (10 mg total) by mouth daily. (Patient not taking: Reported on 3/20/2024)      OneTouch Delica Lancets 33G Does not apply Misc 1 Lancet by Finger stick route As Directed.      Glucose Blood (ONETOUCH VERIO) In Vitro Strip 1 each by Other route 4 (four) times daily.      Microlet Lancets Does not apply Misc 1 each by Other route TID & HS.          HISTORY:  Past Medical History:    Insulin pump in place    Type 1 diabetes mellitus (HCC)      Past Surgical History:   Procedure Laterality Date    Hc  section level i  2020    Hc  section level i  2017    [place] teeth removed        Family History   Problem Relation Age of Onset    Other (multiple sclerosis) Mother     Other (Hemochromatosis) Father     Asthma Brother     Colon Cancer Maternal Grandmother     Other (CABG) Maternal Grandfather     Dementia Maternal Grandfather     Other (Coronary artery disease) Maternal Grandfather     Colon Cancer Paternal Grandmother 70    Blood Cancer Paternal Grandfather 70        Lymphoma?      Social History     Socioeconomic History    Marital status:    Tobacco Use    Smoking status: Never    Smokeless tobacco: Never   Substance and Sexual Activity    Alcohol use: Yes     Alcohol/week: 1.0 standard drink of alcohol     Types: 1 Glasses of wine per week     Comment: 1 per week occasionally    Drug use: Never    Sexual activity: Yes          PHYSICAL EXAMINATION:  /76 (BP Location: Right arm, Patient Position: Sitting, Cuff Size: adult)   Pulse 80   Ht 5' 6\" (1.676 m)   Wt 152 lb (68.9 kg)   BMI 24.53 kg/m²   Physical Exam  Constitutional:       Appearance: Normal appearance.   Abdominal:      Palpations: Abdomen is soft.      Tenderness: There is no abdominal tenderness.   Neurological:      Mental Status: She is alert.   Psychiatric:          Mood and Affect: Mood normal.         Behavior: Behavior normal.           OBSTETRIC ULTRASOUND  The patient had a follow-up growth ultrasound today which revealed normal interval fetal growth.   Ultrasound Findings:  Single IUP in cephalic presentation.    Placenta is posterior.   A 3 vessel cord is noted.  Cardiac activity is present at 141 bpm  EFW 1338 g ( 2 lb 15 oz); 69%.    MVP is 4.3 cm . MONTSERRAT 14.3 cm    The fetal measurements are consistent with established EDC. No gross ultrasound evidence of structural abnormalities are seen today. The patient understands that ultrasound cannot rule out all structural and chromosomal abnormalities.   See PACS/Imaging Tab For Complete Ultrasound Report  I interpreted the results and reviewed them with the patient.    DISCUSSION  During her visit we discussed and reviewed the following issues:  ADVANCED MATERNAL AGE    Background  I reviewed with the patient that pregnancies in women of advanced maternal age (35 or older at delivery) are associated with elevated risks. Specifically, there is a higher rate of:  Fetal malformations  Preeclampsia  Gestational diabetes  Intrauterine fetal death   Please see previous [facility] detailed discussion.     DIABETES MELLITUS  Diagnosed age 6.  One episode on DKA possible.      She is on a pump.  Managed by Endo.  Diabetes type diagnosed at age 6.  Only has had 1 episode potentially of DKA.  She is overall easily controlled.  No retinopathy.  No kidney disease from the diabetes.     We discussed the risks associated with pregestational diabetes.  There is an increased risk of congenital malformations, fetal growth disturbances, preeclampsia, spontaneous  and intrauterine fetal demise (IUFD).  Infants of diabetic mothers (IDMs) are also at increased risk for hypoglycemia and hyperbilirubinemia.   Please see previous [facility] detailed discussion. Managed by endo.    Prevention of Preeclampsia   Please see previous [facility] detailed discussion.            Despite extensive clinical and epidemiologic research, the etiology of placental abruption is heterogeneous and speculative. Risk factors for placental abruption can be classified as those associated with an acute etiology;  medical and obstetrical risk factors; and  maternal sociodemographic and behavioral risk factors.  Please see previous [facility] detailed discussion.         There are no laboratory screening tests that are predictive of an increased risk for abruption. Testing women with a history of abruption for antiphospholipid antibodies or an inherited thrombophilia is not indicated unless other factors are present that could be associated with thrombophilia.     She has type 1 diabetes and has been delivered at 35 weeks for an abruption and then was delivered more in her last pregnancy at 37 weeks for elevated blood pressure.  Therefore I recommended that she be delivered at 38 weeks this pregnancy .    Pump and dexcom will need to be removed for surgery.       Discomforts of pregnancy discussed.  We discussed using heat packs, shoes with arch support, pregnancy support belt, compression stockings, massage therapy, and physical therapy as options to manage the discomforts.    IMPRESSION:  IUP at 28w0d   AMA --NIPT low risk, declines invasive testing   Type I diabetes  History abruption at 35 weeks in Preg #1  History elevated BP and delivery 37 weeks Preg #2  Prior  x 2    RECOMMENDATIONS:  Continue care with Dr. Perales  Check glucoses 4x/day and send to Endo weekly  Monthly growth US in third trimester with BPP at or beyond 32 weeks  Weekly NST at    32 wks / twice weekly at  34  wks      Hgb A1C q trimester  Baseline urine P/C ratio , CMP or AST/ALT/creatinine, TSH  EKG     Ophthalmology exam      Insulin as above  Aspirin 81-120mg daily   Delivery at 38 weeks          Thank you for allowing me to participate in the care of your patient.  Please do not hesitate to contact me if additional  questions or concerns arise.      Randi Oswald M.D.    20 minutes spent in review of records, patient consultation, documentation and coordination of care.  The relevant clinical matter(s) are summarized above.     Note to patient and family  The 21st Century Cures Act makes medical notes available to patients in the interest of transparency.  However, please be advised that this is a medical document.  It is intended as [id number] communication.  It is written and medical language may contain abbreviations or verbiage that are technical and unfamiliar.  It may appear blunt or direct.  Medical documents are intended to carry relevant information, facts as evident, and the clinical opinion of the practitioner.

## 2024-04-22 ENCOUNTER — TELEPHONE (OUTPATIENT)
Facility: CLINIC | Age: 34
End: 2024-04-22

## 2024-04-22 NOTE — TELEPHONE ENCOUNTER
Endocrinology- Monday pregnancy check in re: diabetes control- 4/22/2024 - 28w5d today.     Changes made last check-in:  No changes made- current settings as follows (patient self adjusted basal settings)  Time Basal ICR ISF Active Insulin Time Target   MN 0.90 u/hr 6 35 2.5h  110   0630 1.050 u/hr    35       10P 0.80 u/hr    36         TDD 47.9    Assessment/Plan:  Data reviewed. Several hypo- episodes d/t overrides.     Fasting BG not consistently to goal but suspect moreso d/t rollercoaster of treatment of low BG. Recommend avoiding the overrides for 3-4 days so we can evaluate patterns/settings-- might be too aggressive but difficult to tell what's going on with the number of overrides and would give us a clearer picture to help make changes-    Make the following med changes:  Time Basal ICR ISF Active Insulin Time Target   MN 0.90 u/hr 6  35 2.5h  110   0630 1.050 u/hr    35       10P 0.80 u/hr    36               Routed to DM educator to communicate plan to patient.

## 2024-04-22 NOTE — TELEPHONE ENCOUNTER
Uploaded pts Tandem to CHERELLE deng for weekly review.     Per last weekly check in 4/15:  She is using ~49.7u/day-- current settings appear appropriate. No changes today- will route to DCES to evaluate if there are any other areas of improvement

## 2024-04-23 DIAGNOSIS — O24.013 PRE-EXISTING TYPE 1 DIABETES MELLITUS DURING PREGNANCY IN THIRD TRIMESTER (HCC): Primary | ICD-10-CM

## 2024-04-24 LAB — HIV RESULT OB: NEGATIVE

## 2024-04-24 NOTE — PROGRESS NOTES
Endocrinology Clinic Note    Name: Tristin Gonzalez  Date: 4/25/2024      HISTORY OF PRESENT ILLNESS   Tristin Gonzalez is a 33 year old female with PMHx significant for type 1 diabetes mellitus who presents for T1DM management.    Initial HPI consult in March 2023:   A1C 6.4%  Diagnosed age: 7y/o  Pt denies hx of hospitalization for DM and/or pancreatitis.  Family history of DM: None  Only has had DKA 1x possibly- unsure  Establishing care to be closer to home, prev pt of Dr Perrin in [place]  Using Tandem pump in BasalIQ, is due for pump upgrade but needed updated visit note    Interim hx  June 2023- A1C 6.4% today. Upgraded to control IQ pump in April 2023, liking it so far! Did have to change her settings from LOV, we changed from 100-->55 and was constantly snacking w/ this so incr'd back up to middle ground of 75. She does note that once she hits >200 her insulin resistance seems worse, otherwise very insulin sensitive. No pattern to hypo events, occurring very rarely.    Sept 2023- Last A1c value was 6.2% done 9/15/2023.  Current DM meds: Tandem pump settings (below)   Doing well overall, liking the pump changes we made. Is often manually entering carbs instead of using carb ratio; very busy w/ work and kids  Starting taking iron supplements, has more energy now. Also recently dx'd with ADHD, tried anxiety meds but wasn't a good fit    Nov 2023-  Newly pregnant, LMP 10/4/23, approx 8 wks pregnant, saw OB yesterday ([facility])  Has noticed higher BG readings the past few weeks, using more insulin  Previous pregnancy used Control IQ, prior to that was on medtronic pump    January 2024- w/ endo APN, re: DM. Last A1c value was 5.6% done 4/25/2024. Approx 17 wks pregnany.   Changes made at LOV include: pump settings continue to be adjusted qMonday in the setting of pregnancy.  Subjective updates since last office visit: Feeling well, noting more insulin resistance but also thinks she may have had a  faulty site. Some occasional lows. Sometimes overrides the suggested correction because feels it doesn't give enough. Has been feeling tired lately- on vit D, iron supplement, and started baby aspirin at 12 weeks. Unfortunately a lot of ongoing stressors, had a loss in the family so missed an appt.   Blood glucose updates: Checking with CGM, data below  Current DM meds at visit: Tandem Tslim in MANUAL mode, Dexcom G6    February 2024- w/ endo APN. Last A1c value was 5.7% done 1/31/2024.  Currently 21 weeks, had 20 wk scan & baby girl looked good. She is hoping to make it to 38 wks, planning on csection. Saw MFM. She is hoping to wear pump during Csection/self manage BG while admitted. States overall her energy is good  Current DM meds at visit: Tandem Tslim in MANUAL mode, Dexcom G6    March 2024-w/ [facility] APN. Last A1c value was 5.7% done 1/31/2024. 25w1d today.   Had 24 wk check in last week. Switched from dexcom G6 --> G7; having spotty readings.  For example, random BG on fingerstick 183, on dexcom it's saying its 204 with two up arrows.  Otherwise, thinks carb ratio is going well. Curious if she can wear pump while delivering.  Current DM meds at visit: Tandem Tslim + MANUAL mode + Dexcom G7    April 2024-w/ [facility] APN. Last A1c value was 5.6% done 4/25/2024. 29w1d today.   Documentation from MA: Follow - Up and Diabetes (PT here for routine f/u, c/o of BG running higher than before in past 3 weeks- 140-170). Continues with weekly Monday follow ups.   Overall has felt more difficulty managing BG over the last few weeks due to insulin resistance features. Feels like she has to override to get dose she wants. Notes after some carb doses going low. Feels overall mental health/burnout w/ DM is normal, just having a few ongoing life stressors (unfortunately her dog passed last week unexpectedly)  Current DM meds at visit:   Blood glucose updates:  Tandem Tslim + MANUAL mode + Dexcom G7  Time Basal ICR ISF Active Insulin  Time Target   MN 0.90 u/hr 6 35 2.5h  110   0630 1.050 u/hr   35       10P 0.80 u/hr   36          TDD 52.87u/day    Continuous Glucose Monitoring Interpretation  Tristin Gonzalez has undergone continuous glucose monitoring with their CGM.  The blood glucose tracings were evaluated for two weeks prior to office visit.   Blood glucose tracings demonstrated areas of hyperglycemia following carb intake; followed by hypoglycemia from stacking insulin.      Several overrides needed:    More stable day:             Previously trialed/failed: prev on medtronic pump    REVIEW OF SYSTEMS  Ten point review of systems has been performed and is otherwise negative and/or non-contributory, except as described above.     History/Other:   History  Medications:     Current Outpatient Medications:     insulin glargine (LANTUS SOLOSTAR) 100 UNIT/ML Subcutaneous Solution Pen-injector, Inject 17 Units into the skin nightly. To use in the event of pump failure., Disp: , Rfl:     insulin lispro (HUMALOG) 100 UNIT/ML Injection Solution, Used to fill insulin pump with max 70u daily.  3-month supply, Disp: 70 mL, Rfl: 0    prenatal vitamin with DHA 27-0.8-228 MG Oral Cap, Take 1 capsule by mouth daily., Disp: , Rfl:     cholecalciferol (VITAMIN D3) 125 MCG (5000 UT) Oral Cap, Take 1 capsule (5,000 Units total) by mouth daily., Disp: , Rfl:     aspirin 81 MG Oral Tab EC, Take 1 tablet (81 mg total) by mouth daily., Disp: , Rfl:     ferrous sulfate 325 (65 FE) MG Oral Tab EC, Take 1 tablet (325 mg total) by mouth every other day., Disp: , Rfl:     Glucose Blood (CONTOUR NEXT TEST) In Vitro Strip, Use to test BG up to four times daily., Disp: 400 strip, Rfl: 3    Continuous Blood Gluc Sensor (DEXCOM G7 SENSOR) Does not apply Misc, 1 each Every 10 days., Disp: 9 each, Rfl: 1    glucagon (GVOKE HYPOPEN 2-PACK) 1 MG/0.2ML Subcutaneous SUBQ injection, Inject 0.2 mL (1 mg total) into the skin as needed. Patient trained on Gvoke. No substitutions, Disp:  0.4 mL, Rfl: 1    Insulin Pen Needle (BD PEN NEEDLE CARMEN U/F) 32G X 4 MM Does not apply Misc, Inject 1 Pen into the skin daily. Use as insulin pump back up, Disp: , Rfl:     busPIRone 5 MG Oral Tab, Take 2 tablets (10 mg total) by mouth daily., Disp: , Rfl:     OneTouch Delica Lancets 33G Does not apply Misc, 1 Lancet by Finger stick route As Directed., Disp: , Rfl:     Glucose Blood (ONETOUCH VERIO) In Vitro Strip, 1 each by Other route 4 (four) times daily., Disp: , Rfl:     Microlet Lancets Does not apply Misc, 1 each by Other route TID & HS., Disp: , Rfl:      Allergies:   No Known Allergies    Social History:   Social History     Socioeconomic History    Marital status:    Tobacco Use    Smoking status: Never    Smokeless tobacco: Never   Substance and Sexual Activity    Alcohol use: Yes     Alcohol/week: 1.0 standard drink of alcohol     Types: 1 Glasses of wine per week     Comment: 1 per week occasionally    Drug use: Never    Sexual activity: Yes       Medical History:   Reviewed    Surgical history:   Past Surgical History:   Procedure Laterality Date      section level i  2020      section level i  2017    [place] teeth removed         Objective:   Vitals:  Vitals:    24 0851   BP: 122/70   Pulse: 102   SpO2: 99%           Physical exam:  General Appearance:  alert, well developed, in no acute distress  Eyes:  normal conjunctivae, sclera  Respiratory:  breathing comfortably  Musculoskeletal:  normal muscle strength and tone  Skin:  normal moisture and skin texture  Hair & Nails:  normal scalp hair     Psychiatric:  oriented to time, self, and place  Neuro:  sensory grossly intact and motor grossly intact    Labs/Imaging: Pertinent imaging reviewed.    DM quality metrics:   CAD: none  Neuropathy/ Foot exam: No data recorded  Ophtho:  Mild unilateral NPDR noted on exam; unable to dilate this time due to pregnancy, is going to continue follow up with Dr. Escalante  q1yr  Last Dilated Eye Exam: 04/12/24   Eye Exam shows Diabetic Retinopathy?: Yes    Kidney health evaluation:  At goal  Lab Results   Component Value Date    EGFRCR 111 05/23/2023    MICROALBCREA 16.6 05/23/2023      HTN: At goal  BP Readings from Last 1 Encounters:   04/25/24 122/70     Lipids:  Statin CI; childbearing age  Lab Results   Component Value Date    CHOLEST 152 05/23/2023    LDL 74 05/23/2023    TRIG 43 05/23/2023    HDL 68 (H) 05/23/2023    FASTING Yes 05/23/2023    FASTING Yes 05/23/2023            Assessment & Plan:     ICD-10-CM    1. Type 1 diabetes mellitus affecting pregnancy in third trimester, antepartum (HCC)  O24.013 GLUC MNTR CONT REC FROM NTRSTL TISS FLU PHYS I&R     POC Hgb A1C      2. Mild nonproliferative diabetic retinopathy without macular edema associated with type 1 diabetes mellitus, unspecified laterality ([facility])  E10.3299       3. Statins contraindicated  Z53.09       4. Insulin pump in place  Z96.41       5. Insulin pump titration  Z46.81         Pleasant 33 year old female with PMHx Type 1 diabetes dx'd age 6 (one DKA episode). Currently pregnant, 29w1d today. Continues follow up with MFM.  Weekly check in's with endo re: pump settings. A1C 5.7% in Jan 2024, to goal <6.5%. Last A1c value was 5.6% done 4/25/2024, goal pregnancy <6.5%. Overall still requiring a lot of work on patient's end to manage BG- discussed I want to incr her basal but will require less overrides to avoid hypoglycemia with basal adjustment. ICR may be a bit too aggressive for TDD, so loosened. Created a new profile for 0300 to account for chary phenomenon.    Pump settings:   Tandem TSlim pump- Manual mode-- Using Humalog U100 + Dexcom G7  Time Basal ICR ISF Active Insulin Time Target   MN 0.90 u/hr 6 --> 7 35 --> 32 2.5h  110   0300 0.95 u/hr (new profile)`       0630 1.050 u/hr --> 1.1 u/hr   35 --> 32       10P 0.80 u/hr   36 --> 34                Recommended Postpartum Settings: (Based on previous  settings. Can convert back to control IQ postpartum)  Time Basal ICR ISF Active Insulin Time Target   MN 0.60 u/hr 15 65  3h 110   0630 0.90 u/hr           10P 0.650 u/hr             Diabetes management plan during delivery:  - intrapartum plan: Pt requests that she wear her insulin pump during -  she likely can discontinue pump right before  and re-apply after delivery, with plan to check BG as needed using accuchek during delivery- will reach out to MFM team to eval what is typically done intrapartum.   - postpartum: once baby is delivered, pt is to resume her insulin pump with the above adjusted \"postpartum\" settings. Endocrinology consult can be placed while she is inpatient for postpartum insulin pump management, endo will see the patient during available endo inpatient hours (M-F, 8:30-4)  - If further BG management is needed during off hours, hospitalist will need to be contacted    - MFM contacted 3/28 with postpartum plan, can feel free to contact me at any point for questions/concerns  - TSH stable 2024  - provided with burnout support (Rohini Aguilera our DM educator); feels her DM burnout is stable currently  - Goal is to get as close to 6% as possible without causing hypoglycemia. Fasting blood sugar goal <95. 1 hour after meal <140. 2 hours after meal <120. A1C goal 6-6.5%  - plan for every Monday BG check ins while pregnant  - gvoke sent (2023)  - pregnancy pump backup plan: Lantus 26u nightly, Humalog ICR 1:6, ISF 1:35>100 (updated 2024)  - postpartum pump backup plan: lantus 15u nightly, humalog ICR 1:15, ISF 1:65 > 140      Return in about 1 month (around 2024) for follow up on pump settings.    A total of 44 minutes was spent today on obtaining history, reviewing pertinent labs, evaluating patient, providing multiple treatment options, reinforcing diet/exercise and compliance, and completing documentation.       2024  YEMI Gilbert

## 2024-04-25 ENCOUNTER — OFFICE VISIT (OUTPATIENT)
Facility: CLINIC | Age: 34
End: 2024-04-25
Payer: COMMERCIAL

## 2024-04-25 VITALS — SYSTOLIC BLOOD PRESSURE: 122 MMHG | DIASTOLIC BLOOD PRESSURE: 70 MMHG | OXYGEN SATURATION: 99 % | HEART RATE: 102 BPM

## 2024-04-25 DIAGNOSIS — Z96.41 INSULIN PUMP IN PLACE: ICD-10-CM

## 2024-04-25 DIAGNOSIS — Z53.09 STATINS CONTRAINDICATED: ICD-10-CM

## 2024-04-25 DIAGNOSIS — O24.013 TYPE 1 DIABETES MELLITUS AFFECTING PREGNANCY IN THIRD TRIMESTER, ANTEPARTUM (HCC): Primary | ICD-10-CM

## 2024-04-25 DIAGNOSIS — Z46.81 INSULIN PUMP TITRATION: ICD-10-CM

## 2024-04-25 DIAGNOSIS — E10.3299 MILD NONPROLIFERATIVE DIABETIC RETINOPATHY WITHOUT MACULAR EDEMA ASSOCIATED WITH TYPE 1 DIABETES MELLITUS, UNSPECIFIED LATERALITY (HCC): ICD-10-CM

## 2024-04-25 LAB — HEMOGLOBIN A1C: 5.6 % (ref 4.3–5.6)

## 2024-04-25 PROCEDURE — 83036 HEMOGLOBIN GLYCOSYLATED A1C: CPT

## 2024-04-25 PROCEDURE — 3078F DIAST BP <80 MM HG: CPT

## 2024-04-25 PROCEDURE — 3074F SYST BP LT 130 MM HG: CPT

## 2024-04-25 PROCEDURE — 3044F HG A1C LEVEL LT 7.0%: CPT

## 2024-04-25 PROCEDURE — 99215 OFFICE O/P EST HI 40 MIN: CPT

## 2024-04-25 PROCEDURE — 95251 CONT GLUC MNTR ANALYSIS I&R: CPT

## 2024-04-25 NOTE — PATIENT INSTRUCTIONS
Follow up plan:  With YEMI Ortiz: Return in about 1 month (around 5/25/2024) for follow up on pump settings.      Medications:   Time Basal ICR ISF Active Insulin Time Target   MN 0.90 u/hr 6 --> 7 35 --> 32 2.5h  110   0300 0.95 u/hr (new profile)`       0630 1.050 u/hr --> 1.1 u/hr   35 --> 32       10P 0.80 u/hr   36 --> 34            - pregnancy pump backup plan: Lantus 26u nightly, Humalog ICR 1:6, ISF 1:35>100  - postpartum pump backup plan: lantus 15u nightly, humalog ICR 1:15, ISF 1:65 > 140      Information on Dr. Boston:        Dr. Boston has specialty training in postpartum disorders and diabetes.  As a mom and a type 1 diabetic, she understands at a personal level the struggles that individuals can face while coping with these concerns.    As your therapist, Dr. Boston will work with you on developing an individualized treatment plan to meet your goals.   Throughout therapy, you will work towards these goals through the development of a supportive therapeutic relationship, coping skills and interactive sessions.      Dr. Boston believes that not all individuals can fulfill their therapy needs solely through talking alone.   Consequently, sessions can be individualized based on individual interests and can include art, play therapy, connecting with nature, dance, and music.   At our location, walk and talk sessions are also available! She is described by her patients as a warm, compassionate, and openminded psychologist who will always meet you where you are.     Call [phone] or email at [email or web address] to schedule your appointment today!  Telehealth is available for those that travel to sessions can be difficult.     [address], [address] [place] [address]     Telehealth Available for [facility]    [email or web address]     Blood sugar targets:  Before breakfast: , 2 hours after meals: <180 (preferably <150), A1C goal:  <7.0%  Time in Range goal is higher than 80% if using a continuous glucose monitor (Dexcom or Delgado).  Time in Range can be found within the Dexcom G7 coco, on Clarity if using Dexcom G6, or on LibreView if using Delgado.    HOW TO TREAT LOW BLOOD SUGAR (Hypoglycemia)  Low blood sugar= Less than 70, or if you start to have symptoms (below)  Symptoms: Shaking or trembling, fast heart rate, extreme hunger, sweating, confusion/difficulty concentrating, dizziness.    How to treat a low blood sugar if you are able to eat/drink: The Rule of 15  If you are using continuous glucose monitor that says you are low, but you do not have any symptoms, verify on fingerstick that your blood sugar is actually low before treating.   Eat 15 grams of carbs (see examples below)  Check your blood sugar after 15 minutes. If it’s still below your target range, have another serving.   Repeat these steps until it’s in your target range. Once it’s in range, if you're nervous about your sugar going low again, have a protein source (ie, a spoonful of peanut butter).   If you have a CGM you want to look for how your arrow has changed. If you arrow is pointed up or sideways after 15 min, give your CGM more time OR check with a finger stick. Try not to eat more food until at least 15 min after the first BG check - otherwise you risk having a rebound high.  If you are experiencing symptoms and you are unable to check your blood glucose for any reason, treat the hypoglycemia.  If someone has a low blood sugar and is unconscious: Don’t hesitate to call 911. If someone is unconscious and glucagon is not available or someone does not know how to use it, call 911 immediately.    To treat a low, I recommend you carry with you easy, pre-portioned treatment for low blood sugars that are 15G of carbs:   - Children sized squeeze pouch applesauce (high fiber + carbs help prevent too high of a spike)  - Small children's sized juicebox- 15g carb --> 4oz juice  box  - Glucose tablets from [employer], you can find them near diabetes supplies --> Note, you will need to eat 3-4 tablets to get to 15g of carbs  - Children sized fruit snack pack- look for one with 15 grams of total carbohydrate  - Choice of how to treat your low is important. Complex carbs, or foods that contain fats along with carbs (like chocolate) can slow the absorption of glucose and should NOT be used to treat an emergency low

## 2024-04-29 ENCOUNTER — TELEPHONE (OUTPATIENT)
Facility: CLINIC | Age: 34
End: 2024-04-29

## 2024-04-29 NOTE — TELEPHONE ENCOUNTER
Uploaded pts Tandem report into [employer] for weekly review.     Per LOV 4/25:  Medications:   Time Basal ICR ISF Active Insulin Time Target   MN 0.90 u/hr 6 --> 7 35 --> 32 2.5h  110   0300 0.95 u/hr (new profile)`           0630 1.050 u/hr --> 1.1 u/hr   35 --> 32       10P 0.80 u/hr   36 --> 34             - pregnancy pump backup plan: Lantus 26u nightly, Humalog ICR 1:6, ISF 1:35>100  - postpartum pump backup plan: lantus 15u nightly, humalog ICR 1:15, ISF 1:65 > 140

## 2024-04-29 NOTE — TELEPHONE ENCOUNTER
Endocrinology- Monday pregnancy check in re: diabetes control- 4/29/2024 - 29w5d today.     Changes made last check-in:  Time Basal ICR ISF Active Insulin Time Target   MN 0.90 u/hr 6 --> 7 35 --> 32 2.5h  110   0300 0.95 u/hr            0630 1.050 u/hr --> 1.1 u/hr   35 --> 32       10P 0.80 u/hr   36 --> 34              Assessment/Plan:  Data reviewed. Occasional overnight hypoglycemia though appears to be from stacked insulin doses. 2hPP not consistently to goal but improving. Current basal/bolus split 42/58% respectively. Will adjust basal to be a bit more aggressive which will continue to help with need to take frequent corrections. Also will adjust ISF to reflect TDD 57u; patient should continue using bolus calculator to give corrections.  Make the following med changes:  Time Basal ICR ISF Active Insulin Time Target   MN 0.90 u/hr 7 32 --> 29 2.5h  110   0300 0.95 u/hr            0630 1.150 u/hr --> 1.20 u/hr   32--> 29       10P 0.80 u/hr   34--> 32              Routed to RN staff to communicate plan to patient.         Yesterday appeared more stable:

## 2024-04-29 NOTE — TELEPHONE ENCOUNTER
RN phoned patient to no answer, left detailed message for patient to phone office to discuss pregnancy check in, or review follow up MCM with questions    MCM sent to patient.

## 2024-05-06 ENCOUNTER — TELEPHONE (OUTPATIENT)
Facility: CLINIC | Age: 34
End: 2024-05-06

## 2024-05-06 NOTE — TELEPHONE ENCOUNTER
Please make the following ISF change and overnight basal rate change, and request that the patient does not use any overrides for the next 3 days, and we will check in on Friday and make further adjustments if needed:    Time Basal ICR ISF Active Insulin Time Target   MN 0.90 u/hr 7 29 2.5h  110   0300 0.95 u/hr    32 --> 30       0630 1.150 u/hr    29 --> 27       10P 0.80 u/hr   32 --> 30

## 2024-05-06 NOTE — TELEPHONE ENCOUNTER
Endocrinology- Monday pregnancy check in re: diabetes control- 5/6/2024 - 30w5d today.     Changes made last check-in:  Time Basal ICR ISF Active Insulin Time Target   MN 0.90 u/hr 7 32 --> 29 2.5h  110   0300 0.95 u/hr     32       0630 1.150 u/hr    29       10P 0.80 u/hr   32              Assessment/Plan:  Data reviewed. Ongoing stacking insulin with pump overrides leading to low BG, followed by high BG  Maybe Tristin may want to consider switching back to Control IQ? Might see better results at this point-- including with sleep schedule keeping in tighter control;  with both highs/lows  DCES to phone patient to discuss-- I think trialing that this week might be wise?? Using current settings, then can eval next week how it's going.     Logs:  When appropriate dosing factors used, her blood sugars trend down nicely for the most part (blue) vs. Stacking overrides (yellow):

## 2024-05-06 NOTE — TELEPHONE ENCOUNTER
Spoke with patient who politely declines going back into CIQ.  States things are feeling pretty normalized and she'd rather not add a new element.  Patient would be open to adjusting her corrections in the evening slightly.    Routed back to provider

## 2024-05-06 NOTE — TELEPHONE ENCOUNTER
Uploaded patients Tandem report into [employer] for weekly review.     Per last check in 4/29:  Make the following changes:   Time Basal ICR ISF Active Insulin Time Target   MN 0.90 u/hr 7 32 --> 29 2.5h  110   0300 0.95 u/hr            0630 1.150 u/hr --> 1.20 u/hr   32--> 29       10P 0.80 u/hr   34--> 32

## 2024-05-13 ENCOUNTER — TELEPHONE (OUTPATIENT)
Facility: CLINIC | Age: 34
End: 2024-05-13

## 2024-05-13 NOTE — TELEPHONE ENCOUNTER
Endocrinology- Monday pregnancy check in re: diabetes control- 5/13/2024 - 31w5d today.     Changes made last check-in:  Time Basal ICR ISF Active Insulin Time Target   MN 0.90 u/hr 7 29 2.5h  110   0300 0.95 u/hr    32 --> 30       0630 1.150 u/hr    29 --> 27       10P 0.80 u/hr   32 --> 30          TDD 55.42    Assessment/Plan:  Data reviewed. FBG to goal/stable, 2h PP to goal, occasional hypoglycemia noted  Overall, reports look so much more stable-- will adjust the ICR with goal of less need for postprandial corrections, appears to still be a bit resistant after meals. There are still some lows after override boluses. Generally speaking things look much better with current settings/less overrides  Make the following med changes:    Time Basal ICR ISF Active Insulin Time Target   MN 0.90 u/hr 7 --> 6 29 2.5h  110   0300 0.95 u/hr    30       0630 1.150 u/hr    27       10P 0.80 u/hr   30            Routed to DM educator to communicate plan to patient.         Override, then low:

## 2024-05-13 NOTE — TELEPHONE ENCOUNTER
Uploaded Tandem report into [employer] for weekly check in. Routing to provider for review.     Per last weekly check in 5/6:  Time Basal ICR ISF Active Insulin Time Target   MN 0.90 u/hr 7 29 2.5h  110   0300 0.95 u/hr    32 --> 30       0630 1.150 u/hr    29 --> 27       10P 0.80 u/hr   32 --> 30

## 2024-05-20 ENCOUNTER — OFFICE VISIT (OUTPATIENT)
Facility: CLINIC | Age: 34
End: 2024-05-20

## 2024-05-20 VITALS — DIASTOLIC BLOOD PRESSURE: 68 MMHG | SYSTOLIC BLOOD PRESSURE: 116 MMHG | OXYGEN SATURATION: 99 % | HEART RATE: 76 BPM

## 2024-05-20 DIAGNOSIS — O24.013 TYPE 1 DIABETES MELLITUS AFFECTING PREGNANCY IN THIRD TRIMESTER, ANTEPARTUM (HCC): Primary | ICD-10-CM

## 2024-05-20 DIAGNOSIS — Z96.41 INSULIN PUMP IN PLACE: ICD-10-CM

## 2024-05-20 DIAGNOSIS — E10.3299 MILD NONPROLIFERATIVE DIABETIC RETINOPATHY WITHOUT MACULAR EDEMA ASSOCIATED WITH TYPE 1 DIABETES MELLITUS, UNSPECIFIED LATERALITY (HCC): ICD-10-CM

## 2024-05-20 DIAGNOSIS — Z53.09 STATINS CONTRAINDICATED: ICD-10-CM

## 2024-05-20 DIAGNOSIS — Z46.81 INSULIN PUMP TITRATION: ICD-10-CM

## 2024-05-20 PROCEDURE — 3074F SYST BP LT 130 MM HG: CPT

## 2024-05-20 PROCEDURE — 99214 OFFICE O/P EST MOD 30 MIN: CPT

## 2024-05-20 PROCEDURE — 3078F DIAST BP <80 MM HG: CPT

## 2024-05-20 PROCEDURE — 95251 CONT GLUC MNTR ANALYSIS I&R: CPT

## 2024-05-20 NOTE — PROGRESS NOTES
Endocrinology Clinic Note    Name: Tristin Gonzalez  Date: 5/20/2024      HISTORY OF PRESENT ILLNESS   Tristin Gonzalez is a 33 year old female with PMHx significant for type 1 diabetes mellitus who presents for T1DM management.    Initial HPI consult in March 2023:   A1C 6.4%  Diagnosed age: 7y/o  Pt denies hx of hospitalization for DM and/or pancreatitis.  Family history of DM: None  Only has had DKA 1x possibly- unsure  Establishing care to be closer to home, prev pt of Dr Perrin in [place]  Using Tandem pump in [employer], is due for pump upgrade but needed updated visit note    Interim hx  June 2023- A1C 6.4% today. Upgraded to control IQ pump in April 2023, liking it so far! Did have to change her settings from LOV, we changed from 100-->55 and was constantly snacking w/ this so incr'd back up to middle ground of 75. She does note that once she hits >200 her insulin resistance seems worse, otherwise very insulin sensitive. No pattern to hypo events, occurring very rarely.    Sept 2023- Last A1c value was 6.2% done 9/15/2023.  Current DM meds: Tandem pump settings (below)   Doing well overall, liking the pump changes we made. Is often manually entering carbs instead of using carb ratio; very busy w/ work and kids  Starting taking iron supplements, has more energy now. Also recently dx'd with ADHD, tried anxiety meds but wasn't a good fit    Nov 2023-  Newly pregnant, LMP 10/4/23, approx 8 wks pregnant, saw OB yesterday ([facility])  Has noticed higher BG readings the past few weeks, using more insulin  Previous pregnancy used Control IQ, prior to that was on medtronic pump    January 2024- w/ endo APN, re: DM. Last A1c value was 5.6% done 4/25/2024. Approx 17 wks pregnany.   Changes made at LOV include: pump settings continue to be adjusted qMonday in the setting of pregnancy.  Subjective updates since last office visit: Feeling well, noting more insulin resistance but also thinks she may have had a  faulty site. Some occasional lows. Sometimes overrides the suggested correction because feels it doesn't give enough. Has been feeling tired lately- on vit D, iron supplement, and started baby aspirin at 12 weeks. Unfortunately a lot of ongoing stressors, had a loss in the family so missed an appt.   Blood glucose updates: Checking with CGM, data below  Current DM meds at visit: Tandem Tslim in MANUAL mode, Dexcom G6    February 2024- w/ endo APN. Last A1c value was 5.7% done 1/31/2024.  Currently 21 weeks, had 20 wk scan & baby girl looked good. She is hoping to make it to 38 wks, planning on csection. Saw MFM. She is hoping to wear pump during Csection/self manage BG while admitted. States overall her energy is good  Current DM meds at visit: Tandem Tslim in MANUAL mode, Dexcom G6    March 2024-w/ [facility] APN. Last A1c value was 5.7% done 1/31/2024. 25w1d today.   Had 24 wk check in last week. Switched from dexcom G6 --> G7; having spotty readings.  For example, random BG on fingerstick 183, on dexcom it's saying its 204 with two up arrows.  Otherwise, thinks carb ratio is going well. Curious if she can wear pump while delivering.  Current DM meds at visit: Tandem Tslim + MANUAL mode + Dexcom G7    April 2024-w/ [facility] APN. Last A1c value was 5.6% done 4/25/2024. 29w1d today.   Documentation from MA: Follow - Up and Diabetes (PT here for routine f/u, c/o of BG running higher than before in past 3 weeks- 140-170). Continues with weekly Monday follow ups.   Overall has felt more difficulty managing BG over the last few weeks due to insulin resistance features. Feels like she has to override to get dose she wants. Notes after some carb doses going low. Feels overall mental health/burnout w/ DM is normal, just having a few ongoing life stressors (unfortunately her dog passed last week unexpectedly)  Current DM meds at visit:   Blood glucose updates:  Tandem Tslim + MANUAL mode + Dexcom G7      May 2024-w/ [facility] APN. Last  A1c value was 5.6% done 2024. 32w5d today.   Chief complaint: Diabetes Patient presents for follow up of Type 1 diabetes. She states she still feels the Dexcom G7 is not reading as accurately as she would like but no complaints otherwise.   Had 32 week growth scan last [facility]. Baby is measuring large -- [id number].   Now going once/week for ultrasound and NSTs  Tentatively planning on 38 wk     Current DM meds at visit: Tandem Tslim + MANUAL mode + Dexcom G7  Time Basal ICR ISF Active Insulin Time Target   MN 0.90 u/hr 6 29 2.5h  110   0300 0.95 u/hr   7 30       0630 1.150 u/hr   7 27       10P 0.80 u/hr  7 30              Continuous Glucose Monitoring Interpretation  Tristin Gonzalez has undergone continuous glucose monitoring with their CGM.  The blood glucose tracings were evaluated for two weeks prior to office visit.   Blood glucose tracings demonstrated areas of hyperglycemia post-meal, particularly post-lunch/dinner -- requiring further correction boluses to bring BG back down to range  There were occasional hypoglycemia, particularly overnight (though patient notes these may be false readings- she double checks and they are normal). Her hypoglycemia incidence has vastly improved from prior readings .      Stable day:    Notes discussed during visit (more recent days recall)        Previously trialed/failed: prev on medtronic pump    REVIEW OF SYSTEMS  Ten point review of systems has been performed and is otherwise negative and/or non-contributory, except as described above.     History/Other:   History  Medications:     Current Outpatient Medications:     insulin glargine (LANTUS SOLOSTAR) 100 UNIT/ML Subcutaneous Solution Pen-injector, Inject 17 Units into the skin nightly. To use in the event of pump failure., Disp: , Rfl:     insulin lispro (HUMALOG) 100 UNIT/ML Injection Solution, Used to fill insulin pump with max 70u daily.  3-month supply, Disp: 70 mL, Rfl: 0    prenatal vitamin with  DHA 27-0.8-228 MG Oral Cap, Take 1 capsule by mouth daily., Disp: , Rfl:     cholecalciferol (VITAMIN D3) 125 MCG (5000 UT) Oral Cap, Take 1 capsule (5,000 Units total) by mouth daily., Disp: , Rfl:     aspirin 81 MG Oral Tab EC, Take 1 tablet (81 mg total) by mouth daily., Disp: , Rfl:     ferrous sulfate 325 (65 FE) MG Oral Tab EC, Take 1 tablet (325 mg total) by mouth every other day., Disp: , Rfl:     Glucose Blood (CONTOUR NEXT TEST) In Vitro Strip, Use to test BG up to four times daily., Disp: 400 strip, Rfl: 3    Continuous Blood Gluc Sensor (DEXCOM G7 SENSOR) Does not apply Misc, 1 each Every 10 days., Disp: 9 each, Rfl: 1    glucagon (GVOKE HYPOPEN 2-PACK) 1 MG/0.2ML Subcutaneous SUBQ injection, Inject 0.2 mL (1 mg total) into the skin as needed. Patient trained on Gvoke. No substitutions, Disp: 0.4 mL, Rfl: 1    Insulin Pen Needle (BD PEN NEEDLE CARMEN U/F) 32G X 4 MM Does not apply Misc, Inject 1 Pen into the skin daily. Use as insulin pump back up, Disp: , Rfl:     busPIRone 5 MG Oral Tab, Take 2 tablets (10 mg total) by mouth daily., Disp: , Rfl:     OneTouch Delica Lancets 33G Does not apply Misc, 1 Lancet by Finger stick route As Directed., Disp: , Rfl:     Glucose Blood (ONETOUCH VERIO) In Vitro Strip, 1 each by Other route 4 (four) times daily., Disp: , Rfl:     Microlet Lancets Does not apply Misc, 1 each by Other route TID & HS., Disp: , Rfl:      Allergies:   No Known Allergies    Social History:   Social History     Socioeconomic History    Marital status:    Tobacco Use    Smoking status: Never    Smokeless tobacco: Never   Substance and Sexual Activity    Alcohol use: Yes     Alcohol/week: 1.0 standard drink of alcohol     Types: 1 Glasses of wine per week     Comment: 1 per week occasionally    Drug use: Never    Sexual activity: Yes       Medical History:   Reviewed    Surgical history:   Past Surgical History:   Procedure Laterality Date    Hc  section level i  2020       section level i  2017    [place] teeth removed         Objective:   Vitals:  Vitals:    24 0851   BP: 116/68   Pulse: 76   SpO2: 99%           Physical exam:  General Appearance:  alert, well developed, in no acute distress  Eyes:  normal conjunctivae, sclera  Respiratory:  breathing comfortably  Musculoskeletal:  normal muscle strength and tone  Skin:  normal moisture and skin texture  Hair & Nails:  normal scalp hair     Psychiatric:  oriented to time, self, and place  Neuro:  sensory grossly intact and motor grossly intact    Labs/Imaging: Pertinent imaging reviewed.    DM quality metrics:   CAD: none  Neuropathy/ Foot exam: No data recorded  Ophtho:  Mild unilateral NPDR noted on exam; unable to dilate this time due to pregnancy, is going to continue follow up with Dr. Escalante q1yr  Last Dilated Eye Exam: 24   Eye Exam shows Diabetic Retinopathy?: Yes    Kidney health evaluation:  At goal  Lab Results   Component Value Date    EGFRCR 111 2023    MICROALBCREA 16.6 2023      HTN: At goal  BP Readings from Last 1 Encounters:   24 116/68     Lipids:  Statin CI; childbearing age  Lab Results   Component Value Date    CHOLEST 152 2023    LDL 74 2023    TRIG 43 2023    HDL 68 (H) 2023    FASTING Yes 2023    FASTING Yes 2023            Assessment & Plan:     ICD-10-CM    1. Type 1 diabetes mellitus affecting pregnancy in third trimester, antepartum (HCC)  O24.013 GLUC MNTR CONT REC FROM NTRSTL TISS FLU PHYS I&R      2. Insulin pump titration  Z46.81       3. Insulin pump in place  Z96.41       4. Statins contraindicated  Z53.09       5. 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 Currently pregnant, 32w5d today. Continues follow up with MFM.  Weekly  check in's with endo re: pump settings. A1C 5.7% in 2024, to goal <6.5%. 2024 A1c value was 5.6% done 2024, goal pregnancy <6.5%. Still needing to override post-meal to get BG down- will adjust ICR. Overall much more stability in BG control. Discussed postpartum plans again, feels comfortable with plan, will revert back to control IQ and settings below postpartum.    Pump settings:   Tandem Tslim + MANUAL mode + Dexcom G7  Time Basal ICR ISF Active Insulin Time Target   MN 0.90 u/hr 6 29 2.5h  110   0300 0.95 u/hr   7 --> 6 30       0630 1.150 u/hr   7 --> 6 27       10P 0.80 u/hr  7 --> 6 30              Recommended Postpartum Settings: (Based on previous settings. Can convert back to control IQ postpartum)  Time Basal ICR ISF Active Insulin Time Target   MN 0.60 u/hr 15 65  3h 110   0630 0.90 u/hr           10P 0.650 u/hr             Diabetes management plan during delivery:  - intrapartum plan: Pt requests that she wear her insulin pump during -  she likely can discontinue pump right before  and re-apply after delivery, with plan to check BG as needed using accuchek during delivery per M  - postpartum: once baby is delivered, pt is to resume her insulin pump with the above adjusted \"postpartum\" settings. Endocrinology consult can be placed while she is inpatient for postpartum insulin pump management, endo will see the patient during available endo inpatient hours (M-F, 8:30-4)  - If further BG management is needed during off hours, hospitalist will need to be contacted    - provided w/ dexcom G7 sample for postpartum application  - MFM contacted 3/28 with postpartum plan, can feel free to contact me at any point for questions/concerns  - TSH stable 2024  - Goal is to get as close to 6% as possible without causing hypoglycemia. Fasting blood sugar goal <95. 1 hour after meal <140. 2 hours after meal <120. A1C goal 6-6.5%  - plan for every Monday BG check ins while pregnant  -  gvoke sent (11/2023)  - pregnancy pump backup plan: Lantus 28u nightly, Humalog ICR 1:6, ISF 1:35>100 (updated 5/2024)  - postpartum pump backup plan: lantus 15u nightly, humalog ICR 1:15, ISF 1:65 > 140      Return in about 2 months (around 8/1/2024) for evaluation of insulin adjustments (postpartum).    5/20/2024  YEMI Gilbert

## 2024-05-20 NOTE — PATIENT INSTRUCTIONS
Follow up plan:  With YEMI Ortiz: Return in about 3 months (around 8/20/2024) for evaluation of insulin adjustments. --> aim for early/mid August      Medications:   Tandem Tslim + MANUAL mode + Dexcom G7  Time Basal ICR ISF Active Insulin Time Target   MN 0.90 u/hr 6 29 2.5h  110   0300 0.95 u/hr   7 --> 6 30       0630 1.150 u/hr   7 --> 6 27       10P 0.80 u/hr  7 --> 6 30            Recommended Postpartum Settings: (Based on previous settings. Can convert back to control IQ postpartum)  Time Basal ICR ISF Active Insulin Time Target   MN 0.60 u/hr 15 65  3h 110   0630 0.90 u/hr           10P 0.650 u/hr               Blood sugar targets:  Before breakfast: , 2 hours after meals: <180 (preferably <150), A1C goal: <7.0%  Time in Range goal is higher than 80% if using a continuous glucose monitor (Dexcom or Delgado).  Time in Range can be found within the Dexcom G7 coco, on Clarity if using Dexcom G6, or on LibreView if using Delgado.    HOW TO TREAT LOW BLOOD SUGAR (Hypoglycemia)  Low blood sugar= Less than 70, or if you start to have symptoms (below)  Symptoms: Shaking or trembling, fast heart rate, extreme hunger, sweating, confusion/difficulty concentrating, dizziness.    How to treat a low blood sugar if you are able to eat/drink: The Rule of 15  If you are using continuous glucose monitor that says you are low, but you do not have any symptoms, verify on fingerstick that your blood sugar is actually low before treating.   Eat 15 grams of carbs (see examples below)  Check your blood sugar after 15 minutes. If it’s still below your target range, have another serving.   Repeat these steps until it’s in your target range. Once it’s in range, if you're nervous about your sugar going low again, have a protein source (ie, a spoonful of peanut butter).   If you have a CGM you want to look for how your arrow has changed. If you arrow is pointed up or sideways after 15 min, give your CGM more time OR check with  a finger stick. Try not to eat more food until at least 15 min after the first BG check - otherwise you risk having a rebound high.  If you are experiencing symptoms and you are unable to check your blood glucose for any reason, treat the hypoglycemia.  If someone has a low blood sugar and is unconscious: Don’t hesitate to call 911. If someone is unconscious and glucagon is not available or someone does not know how to use it, call 911 immediately.    To treat a low, I recommend you carry with you easy, pre-portioned treatment for low blood sugars that are 15G of carbs:   - Children sized squeeze pouch applesauce (high fiber + carbs help prevent too high of a spike)  - Small children's sized juicebox- 15g carb --> 4oz juice box  - Glucose tablets from [employer], you can find them near diabetes supplies --> Note, you will need to eat 3-4 tablets to get to 15g of carbs  - Children sized fruit snack pack- look for one with 15 grams of total carbohydrate  - Choice of how to treat your low is important. Complex carbs, or foods that contain fats along with carbs (like chocolate) can slow the absorption of glucose and should NOT be used to treat an emergency low    Thank you for visiting our office. We look forward to working with you to reach your health goals. As a reminder, if you need refills, please request early so there is enough time to process the request. We ask that you provide at least 5 days' notice before a refill is due, so there is time to send a request to pharmacy, process the refill, and ensure there are no other problems with obtaining the medication (backorders, prior authorization paperwork, etc). Routine refills will not be addressed on weekends, so please submit these requests during the week.

## 2024-05-28 ENCOUNTER — TELEPHONE (OUTPATIENT)
Facility: CLINIC | Age: 34
End: 2024-05-28

## 2024-05-28 NOTE — TELEPHONE ENCOUNTER
Endocrinology- Monday pregnancy check in re: diabetes control- 5/28/2024 - 33w6d today.     Changes made last check-in:    Tandem Tslim + MANUAL mode + Dexcom G7  Time Basal ICR ISF Active Insulin Time Target   MN 0.90 u/hr 6 29 2.5h  110   0300 0.95 u/hr   7 --> 6 30       0630 1.150 u/hr   7 --> 6 27       10P 0.80 u/hr  7 --> 6 30             Assessment/Plan:  Data reviewed. Having overnight hypoglycemia.  Yesterday and Saturday BG appeared stable  No changes to pump settings today, continue:  Time Basal ICR ISF Active Insulin Time Target   MN 0.90 u/hr 6 29 2.5h  110   0300 0.95 u/hr   7  30       0630 1.150 u/hr   7 27       10P 0.80 u/hr  7 30           Routed to DM educator to communicate plan to patient.

## 2024-05-28 NOTE — TELEPHONE ENCOUNTER
Uploaded pts Tandem report into [employer] for weekly review. Routing to APN for review.     Per last office visit 5/20:  Tandem Tslim + MANUAL mode + Dexcom G7  Time Basal ICR ISF Active Insulin Time Target   MN 0.90 u/hr 6 29 2.5h  110   0300 0.95 u/hr   7 --> 6 30       0630 1.150 u/hr   7 --> 6 27       10P 0.80 u/hr  7 --> 6 30

## 2024-06-04 ENCOUNTER — PATIENT MESSAGE (OUTPATIENT)
Facility: CLINIC | Age: 34
End: 2024-06-04

## 2024-06-04 ENCOUNTER — TELEPHONE (OUTPATIENT)
Facility: CLINIC | Age: 34
End: 2024-06-04

## 2024-06-04 NOTE — TELEPHONE ENCOUNTER
Uploaded pts Tandem report into [employer] for weekly review.     Per last weekly check in 5/28: Data reviewed. Having overnight hypoglycemia.  Yesterday and Saturday BG appeared stable  No changes to pump settings today, continue:  Time Basal ICR ISF Active Insulin Time Target   MN 0.90 u/hr 6 29 2.5h  110   0300 0.95 u/hr   7  30       0630 1.150 u/hr   7 27       10P 0.80 u/hr  7 30

## 2024-06-04 NOTE — TELEPHONE ENCOUNTER
Endocrinology-  pregnancy check in re: diabetes control- 6/4/2024 - 34w6d today.     Changes made last check-in: No changes made. Current settings:  Time Basal ICR ISF Active Insulin Time Target   MN 0.90 u/hr 6 29 2.5h  110   0300 0.95 u/hr   7  30       0630 1.150 u/hr   7 27       10P 0.80 u/hr  7 30        TDD 52u    Assessment/Plan:  Data reviewed. Fasting BG overall much more stable than before. Occasionall hyperglycemia followed by pump overrides and subsequent hypoglycemia.    Will continue current pump settings, encourage to not override calculated dose:  Time Basal ICR ISF Active Insulin Time Target   MN 0.90 u/hr 6 29 2.5h  110   0300 0.95 u/hr   7  30       0630 1.150 u/hr   7 27       10P 0.80 u/hr  7 30           My chart message sent to communicate plan to patient.

## 2024-06-05 NOTE — TELEPHONE ENCOUNTER
My chart message sent to patient with Monday check in  Noted updated  date to   Sent to endo physicians as an FYI

## 2024-06-10 ENCOUNTER — TELEPHONE (OUTPATIENT)
Facility: CLINIC | Age: 34
End: 2024-06-10

## 2024-06-10 NOTE — TELEPHONE ENCOUNTER
Uploaded pts Tandem report to [employer] for weekly review.     Per last weekly check in 6/4:  No changes made. Current settings:  Time Basal ICR ISF Active Insulin Time Target   MN 0.90 u/hr 6 29 2.5h  110   0300 0.95 u/hr   7  30       0630 1.150 u/hr   7 27       10P 0.80 u/hr  7 30        TDD 52u

## 2024-06-10 NOTE — TELEPHONE ENCOUNTER
Endocrinology- Monday pregnancy check in re: diabetes control- 6/10/2024 - 35w5d today.       No changes made last check-in.  Current settings:  Time Basal ICR ISF Active Insulin Time Target   MN 0.90 u/hr 6 29 2.5h  110   0300 0.95 u/hr   7  30       0630 1.150 u/hr   7 27       10P 0.80 u/hr  7 30             Assessment/Plan:  Data reviewed. FBG to goal/stable, 2h PP to goal  No medication changes today, continue current plan.       Routed to DM educator to communicate plan to patient.   Sent staff message to M to confirm plan.

## 2024-06-17 ENCOUNTER — TELEPHONE (OUTPATIENT)
Facility: CLINIC | Age: 34
End: 2024-06-17

## 2024-06-17 NOTE — TELEPHONE ENCOUNTER
Phoned pt to no answer, requested pt call back to our office to discuss Tandem pump report issue. Routing to clinical pool .

## 2024-06-17 NOTE — TELEPHONE ENCOUNTER
Uploaded pt Tandem report to [employer] for weekly review.     Per last weekly check in 6/10:  Current settings:  Time Basal ICR ISF Active Insulin Time Target   MN 0.90 u/hr 6 29 2.5h  110   0300 0.95 u/hr   7  30       0630 1.150 u/hr   7 27       10P 0.80 u/hr  7 30               Assessment/Plan:  Data reviewed. FBG to goal/stable, 2h PP to goal  No medication changes today, continue current plan.

## 2024-06-18 ENCOUNTER — PATIENT MESSAGE (OUTPATIENT)
Facility: CLINIC | Age: 34
End: 2024-06-18

## 2024-06-18 NOTE — TELEPHONE ENCOUNTER
Attempted to phone patient-- left VM asking to open her Tandem coco on her phone which should upload reports  Advised she can reduce basal by 0.10u/hr  Will attempt to look at reports by 8:30am tomorrow to see trends/how hypoglycemic she's going    Current settings:  Time Basal ICR ISF Active Insulin Time Target   MN 0.90 u/hr 6 29 2.5h  110   0300 0.95 u/hr   7  30       0630 1.150 u/hr   7 27       10P 0.80 u/hr  7 30

## 2024-06-18 NOTE — TELEPHONE ENCOUNTER
Patient returned phone call. hospitals is unsure why we are unable to see pump reports as she is still wearing pump. States she did replace her cgm and has been having abnormal readings, has double checked with finger sticks and finger sticks are much lower. hospitals has been relying on just finger sticks for accurancy. Is scheduled for 12pm c section 6/20. Doesn't want low blood sugars and is wondering how she should change her settings to in order to prevent any lows on this day.     Routing to N.

## 2024-06-19 NOTE — TELEPHONE ENCOUNTER
Reports overnight dexcom is reading low- thinks it's due to false sensor- when fingersticks, is 82. Asymptomatic during these episodes-- is just going to continue with fingersticks. Otherwise her hypoglycemia episodes are due to over-estimate of insulin needs.    She is concerned she may go low while fasting tomorrow as  is scheduled for noon.    Changes to basal settings on delivery day (to avoid hypoglycemia while fasting): If BG >180 while fasting, can incr basal rate to 0.90 u/hr  Time Basal ICR ISF Active Insulin Time Target   MN 0.90 u/hr 6 29 2.5h  110   0300 0.95 u/hr ---> 0.80 u/hr  7  30       0630 1.150 u/hr ---> 0.80 u/hr  7 27       10P 0.80 u/hr  7 30         Can also set temp basal if needed to 20% dose reduction (program a 80%); however wouldn't do both if she reduces the basal pre-emptively:  https://www.[employer]/docs/default-source/quick-reference/quick-reference-set-temp-basal-rate-tslimx2-wq638067.pdf?sfvrsn=xvvy45x0_57    Patient confirmed she has postpartum settings programmed.

## 2024-06-20 ENCOUNTER — ANESTHESIA EVENT (OUTPATIENT)
Dept: OBGYN UNIT | Facility: HOSPITAL | Age: 34
End: 2024-06-20

## 2024-06-20 ENCOUNTER — HOSPITAL ENCOUNTER (INPATIENT)
Facility: HOSPITAL | Age: 34
LOS: 3 days | Discharge: HOME | End: 2024-06-23
Attending: OBSTETRICS & GYNECOLOGY | Admitting: OBSTETRICS & GYNECOLOGY

## 2024-06-20 ENCOUNTER — ANESTHESIA (OUTPATIENT)
Dept: OBGYN UNIT | Facility: HOSPITAL | Age: 34
End: 2024-06-20

## 2024-06-20 PROBLEM — Z34.90 PREGNANCY (HCC): Status: ACTIVE | Noted: 2024-06-20

## 2024-06-20 LAB
ALBUMIN SERPL-MCNC: 2.6 G/DL (ref 3.4–5)
ALBUMIN/GLOB SERPL: 0.7 {RATIO} (ref 1–2)
ALP LIVER SERPL-CCNC: 166 U/L
ALT SERPL-CCNC: 11 U/L
ANION GAP SERPL CALC-SCNC: 11 MMOL/L (ref 0–18)
ANTIBODY SCREEN: NEGATIVE
AST SERPL-CCNC: 19 U/L (ref 15–37)
BASOPHILS # BLD AUTO: 0.05 X10(3) UL (ref 0–0.2)
BASOPHILS NFR BLD AUTO: 0.6 %
BILIRUB SERPL-MCNC: 0.5 MG/DL (ref 0.1–2)
BUN BLD-MCNC: 13 MG/DL (ref 9–23)
CALCIUM BLD-MCNC: 8.3 MG/DL (ref 8.5–10.1)
CHLORIDE SERPL-SCNC: 106 MMOL/L (ref 98–112)
CO2 SERPL-SCNC: 20 MMOL/L (ref 21–32)
CREAT BLD-MCNC: 0.72 MG/DL
CREAT UR-SCNC: 191 MG/DL
EGFRCR SERPLBLD CKD-EPI 2021: 112 ML/MIN/1.73M2 (ref 60–?)
EOSINOPHIL # BLD AUTO: 0.05 X10(3) UL (ref 0–0.7)
EOSINOPHIL NFR BLD AUTO: 0.6 %
ERYTHROCYTE [DISTWIDTH] IN BLOOD BY AUTOMATED COUNT: 13.7 %
GLOBULIN PLAS-MCNC: 3.5 G/DL (ref 2.8–4.4)
GLUCOSE BLD-MCNC: 134 MG/DL (ref 70–99)
GLUCOSE BLD-MCNC: 139 MG/DL (ref 70–99)
GLUCOSE BLD-MCNC: 140 MG/DL (ref 70–99)
HCT VFR BLD AUTO: 36.3 %
HGB BLD-MCNC: 12.3 G/DL
IMM GRANULOCYTES # BLD AUTO: 0.06 X10(3) UL (ref 0–1)
IMM GRANULOCYTES NFR BLD: 0.8 %
LYMPHOCYTES # BLD AUTO: 1.39 X10(3) UL (ref 1–4)
LYMPHOCYTES NFR BLD AUTO: 17.9 %
MCH RBC QN AUTO: 27.8 PG (ref 26–34)
MCHC RBC AUTO-ENTMCNC: 33.9 G/DL (ref 31–37)
MCV RBC AUTO: 82.1 FL
MONOCYTES # BLD AUTO: 0.47 X10(3) UL (ref 0.1–1)
MONOCYTES NFR BLD AUTO: 6 %
NEUTROPHILS # BLD AUTO: 5.75 X10 (3) UL (ref 1.5–7.7)
NEUTROPHILS # BLD AUTO: 5.75 X10(3) UL (ref 1.5–7.7)
NEUTROPHILS NFR BLD AUTO: 74.1 %
OSMOLALITY SERPL CALC.SUM OF ELEC: 286 MOSM/KG (ref 275–295)
PLATELET # BLD AUTO: 113 10(3)UL (ref 150–450)
POTASSIUM SERPL-SCNC: 4.2 MMOL/L (ref 3.5–5.1)
PROT SERPL-MCNC: 6.1 G/DL (ref 6.4–8.2)
PROT UR-MCNC: 118.7 MG/DL
PROT/CREAT UR-RTO: 0.62
RBC # BLD AUTO: 4.42 X10(6)UL
RH BLOOD TYPE: POSITIVE
RH BLOOD TYPE: POSITIVE
SODIUM SERPL-SCNC: 137 MMOL/L (ref 136–145)
T PALLIDUM AB SER QL IA: NONREACTIVE
WBC # BLD AUTO: 7.8 X10(3) UL (ref 4–11)

## 2024-06-20 RX ORDER — PROCHLORPERAZINE EDISYLATE 5 MG/ML
10 INJECTION INTRAMUSCULAR; INTRAVENOUS ONCE
Status: COMPLETED | OUTPATIENT
Start: 2024-06-20 | End: 2024-06-20

## 2024-06-20 RX ORDER — SCOLOPAMINE TRANSDERMAL SYSTEM 1 MG/1
1 PATCH, EXTENDED RELEASE TRANSDERMAL
Status: DISCONTINUED | OUTPATIENT
Start: 2024-06-20 | End: 2024-06-23

## 2024-06-20 RX ORDER — ONDANSETRON 2 MG/ML
INJECTION INTRAMUSCULAR; INTRAVENOUS AS NEEDED
Status: DISCONTINUED | OUTPATIENT
Start: 2024-06-20 | End: 2024-06-20 | Stop reason: SURG

## 2024-06-20 RX ORDER — ONDANSETRON 2 MG/ML
4 INJECTION INTRAMUSCULAR; INTRAVENOUS ONCE AS NEEDED
Status: COMPLETED | OUTPATIENT
Start: 2024-06-20 | End: 2024-06-20

## 2024-06-20 RX ORDER — ONDANSETRON 2 MG/ML
INJECTION INTRAMUSCULAR; INTRAVENOUS
Status: COMPLETED
Start: 2024-06-20 | End: 2024-06-20

## 2024-06-20 RX ORDER — ACETAMINOPHEN 500 MG
1000 TABLET ORAL EVERY 6 HOURS
Status: DISCONTINUED | OUTPATIENT
Start: 2024-06-20 | End: 2024-06-23

## 2024-06-20 RX ORDER — METOCLOPRAMIDE HYDROCHLORIDE 5 MG/ML
INJECTION INTRAMUSCULAR; INTRAVENOUS
Status: COMPLETED
Start: 2024-06-20 | End: 2024-06-20

## 2024-06-20 RX ORDER — BISACODYL 10 MG
10 SUPPOSITORY, RECTAL RECTAL ONCE AS NEEDED
Status: DISCONTINUED | OUTPATIENT
Start: 2024-06-20 | End: 2024-06-23

## 2024-06-20 RX ORDER — BUPIVACAINE HYDROCHLORIDE 7.5 MG/ML
INJECTION, SOLUTION INTRASPINAL AS NEEDED
Status: DISCONTINUED | OUTPATIENT
Start: 2024-06-20 | End: 2024-06-20 | Stop reason: SURG

## 2024-06-20 RX ORDER — NALOXONE HYDROCHLORIDE 0.4 MG/ML
0.08 INJECTION, SOLUTION INTRAMUSCULAR; INTRAVENOUS; SUBCUTANEOUS
Status: ACTIVE | OUTPATIENT
Start: 2024-06-20 | End: 2024-06-21

## 2024-06-20 RX ORDER — DIPHENHYDRAMINE HCL 25 MG
25 CAPSULE ORAL EVERY 4 HOURS PRN
Status: DISCONTINUED | OUTPATIENT
Start: 2024-06-20 | End: 2024-06-23

## 2024-06-20 RX ORDER — SIMETHICONE 80 MG
80 TABLET,CHEWABLE ORAL 3 TIMES DAILY PRN
Status: DISCONTINUED | OUTPATIENT
Start: 2024-06-20 | End: 2024-06-23

## 2024-06-20 RX ORDER — LABETALOL 200 MG/1
200 TABLET, FILM COATED ORAL 2 TIMES DAILY
Status: DISCONTINUED | OUTPATIENT
Start: 2024-06-20 | End: 2024-06-23

## 2024-06-20 RX ORDER — IBUPROFEN 600 MG/1
600 TABLET ORAL EVERY 6 HOURS
Status: DISCONTINUED | OUTPATIENT
Start: 2024-06-21 | End: 2024-06-23

## 2024-06-20 RX ORDER — KETOROLAC TROMETHAMINE 30 MG/ML
30 INJECTION, SOLUTION INTRAMUSCULAR; INTRAVENOUS EVERY 6 HOURS
Status: DISCONTINUED | OUTPATIENT
Start: 2024-06-20 | End: 2024-06-20

## 2024-06-20 RX ORDER — FAMOTIDINE 10 MG
10 TABLET ORAL 2 TIMES DAILY
COMMUNITY
End: 2024-06-23

## 2024-06-20 RX ORDER — KETOROLAC TROMETHAMINE 30 MG/ML
INJECTION, SOLUTION INTRAMUSCULAR; INTRAVENOUS
Status: COMPLETED
Start: 2024-06-20 | End: 2024-06-20

## 2024-06-20 RX ORDER — NICOTINE POLACRILEX 4 MG
15 LOZENGE BUCCAL
Status: DISCONTINUED | OUTPATIENT
Start: 2024-06-20 | End: 2024-06-23

## 2024-06-20 RX ORDER — NALBUPHINE HYDROCHLORIDE 10 MG/ML
2.5 INJECTION, SOLUTION INTRAMUSCULAR; INTRAVENOUS; SUBCUTANEOUS
Status: DISCONTINUED | OUTPATIENT
Start: 2024-06-20 | End: 2024-06-20 | Stop reason: HOSPADM

## 2024-06-20 RX ORDER — DEXTROSE MONOHYDRATE 25 G/50ML
50 INJECTION, SOLUTION INTRAVENOUS
Status: DISCONTINUED | OUTPATIENT
Start: 2024-06-20 | End: 2024-06-20

## 2024-06-20 RX ORDER — PHENYLEPHRINE HCL 10 MG/ML
VIAL (ML) INJECTION AS NEEDED
Status: DISCONTINUED | OUTPATIENT
Start: 2024-06-20 | End: 2024-06-20 | Stop reason: SURG

## 2024-06-20 RX ORDER — IBUPROFEN 600 MG/1
600 TABLET ORAL EVERY 6 HOURS
Status: DISCONTINUED | OUTPATIENT
Start: 2024-06-21 | End: 2024-06-20

## 2024-06-20 RX ORDER — KETOROLAC TROMETHAMINE 30 MG/ML
30 INJECTION, SOLUTION INTRAMUSCULAR; INTRAVENOUS EVERY 6 HOURS
Status: DISCONTINUED | OUTPATIENT
Start: 2024-06-21 | End: 2024-06-20

## 2024-06-20 RX ORDER — ONDANSETRON 2 MG/ML
INJECTION INTRAMUSCULAR; INTRAVENOUS
Status: DISPENSED
Start: 2024-06-20 | End: 2024-06-21

## 2024-06-20 RX ORDER — DOCUSATE SODIUM 100 MG/1
100 CAPSULE, LIQUID FILLED ORAL
Status: DISCONTINUED | OUTPATIENT
Start: 2024-06-20 | End: 2024-06-23

## 2024-06-20 RX ORDER — DEXTROSE, SODIUM CHLORIDE, SODIUM LACTATE, POTASSIUM CHLORIDE, AND CALCIUM CHLORIDE 5; .6; .31; .03; .02 G/100ML; G/100ML; G/100ML; G/100ML; G/100ML
INJECTION, SOLUTION INTRAVENOUS CONTINUOUS PRN
Status: DISCONTINUED | OUTPATIENT
Start: 2024-06-20 | End: 2024-06-23

## 2024-06-20 RX ORDER — EPHEDRINE SULFATE 50 MG/ML
INJECTION INTRAVENOUS AS NEEDED
Status: DISCONTINUED | OUTPATIENT
Start: 2024-06-20 | End: 2024-06-20 | Stop reason: SURG

## 2024-06-20 RX ORDER — CITRIC ACID/SODIUM CITRATE 334-500MG
30 SOLUTION, ORAL ORAL ONCE
Status: COMPLETED | OUTPATIENT
Start: 2024-06-20 | End: 2024-06-20

## 2024-06-20 RX ORDER — METOCLOPRAMIDE HYDROCHLORIDE 5 MG/ML
10 INJECTION INTRAMUSCULAR; INTRAVENOUS EVERY 8 HOURS PRN
Status: DISCONTINUED | OUTPATIENT
Start: 2024-06-20 | End: 2024-06-23

## 2024-06-20 RX ORDER — DIPHENHYDRAMINE HYDROCHLORIDE 50 MG/ML
12.5 INJECTION INTRAMUSCULAR; INTRAVENOUS EVERY 4 HOURS PRN
Status: DISCONTINUED | OUTPATIENT
Start: 2024-06-20 | End: 2024-06-23

## 2024-06-20 RX ORDER — KETOROLAC TROMETHAMINE 30 MG/ML
30 INJECTION, SOLUTION INTRAMUSCULAR; INTRAVENOUS EVERY 6 HOURS
Status: DISPENSED | OUTPATIENT
Start: 2024-06-21 | End: 2024-06-21

## 2024-06-20 RX ORDER — ACETAMINOPHEN 500 MG
1000 TABLET ORAL EVERY 6 HOURS
Status: DISCONTINUED | OUTPATIENT
Start: 2024-06-20 | End: 2024-06-20

## 2024-06-20 RX ORDER — SODIUM CHLORIDE, SODIUM LACTATE, POTASSIUM CHLORIDE, CALCIUM CHLORIDE 600; 310; 30; 20 MG/100ML; MG/100ML; MG/100ML; MG/100ML
INJECTION, SOLUTION INTRAVENOUS CONTINUOUS
Status: DISCONTINUED | OUTPATIENT
Start: 2024-06-20 | End: 2024-06-20

## 2024-06-20 RX ORDER — KETOROLAC TROMETHAMINE 30 MG/ML
30 INJECTION, SOLUTION INTRAMUSCULAR; INTRAVENOUS ONCE
Status: DISCONTINUED | OUTPATIENT
Start: 2024-06-20 | End: 2024-06-20 | Stop reason: HOSPADM

## 2024-06-20 RX ORDER — NICOTINE POLACRILEX 4 MG
30 LOZENGE BUCCAL
Status: DISCONTINUED | OUTPATIENT
Start: 2024-06-20 | End: 2024-06-20

## 2024-06-20 RX ORDER — NALBUPHINE HYDROCHLORIDE 10 MG/ML
2.5 INJECTION, SOLUTION INTRAMUSCULAR; INTRAVENOUS; SUBCUTANEOUS EVERY 4 HOURS PRN
Status: DISCONTINUED | OUTPATIENT
Start: 2024-06-20 | End: 2024-06-23

## 2024-06-20 RX ORDER — GABAPENTIN 300 MG/1
300 CAPSULE ORAL EVERY 8 HOURS SCHEDULED
Status: DISCONTINUED | OUTPATIENT
Start: 2024-06-20 | End: 2024-06-23

## 2024-06-20 RX ORDER — SODIUM CHLORIDE, SODIUM LACTATE, POTASSIUM CHLORIDE, CALCIUM CHLORIDE 600; 310; 30; 20 MG/100ML; MG/100ML; MG/100ML; MG/100ML
125 INJECTION, SOLUTION INTRAVENOUS CONTINUOUS
Status: DISCONTINUED | OUTPATIENT
Start: 2024-06-20 | End: 2024-06-20 | Stop reason: HOSPADM

## 2024-06-20 RX ORDER — NICOTINE POLACRILEX 4 MG
30 LOZENGE BUCCAL
Status: DISCONTINUED | OUTPATIENT
Start: 2024-06-20 | End: 2024-06-23

## 2024-06-20 RX ORDER — DEXTROSE MONOHYDRATE 25 G/50ML
50 INJECTION, SOLUTION INTRAVENOUS
Status: DISCONTINUED | OUTPATIENT
Start: 2024-06-20 | End: 2024-06-23

## 2024-06-20 RX ORDER — ONDANSETRON 2 MG/ML
4 INJECTION INTRAMUSCULAR; INTRAVENOUS EVERY 6 HOURS PRN
Status: DISCONTINUED | OUTPATIENT
Start: 2024-06-20 | End: 2024-06-23

## 2024-06-20 RX ORDER — MORPHINE SULFATE 2 MG/ML
INJECTION, SOLUTION INTRAMUSCULAR; INTRAVENOUS AS NEEDED
Status: DISCONTINUED | OUTPATIENT
Start: 2024-06-20 | End: 2024-06-20 | Stop reason: SURG

## 2024-06-20 RX ORDER — NICOTINE POLACRILEX 4 MG
15 LOZENGE BUCCAL
Status: DISCONTINUED | OUTPATIENT
Start: 2024-06-20 | End: 2024-06-20

## 2024-06-20 RX ORDER — ONDANSETRON 2 MG/ML
4 INJECTION INTRAMUSCULAR; INTRAVENOUS EVERY 6 HOURS PRN
Status: DISCONTINUED | OUTPATIENT
Start: 2024-06-20 | End: 2024-06-20 | Stop reason: HOSPADM

## 2024-06-20 RX ORDER — ACETAMINOPHEN 500 MG
1000 TABLET ORAL ONCE
Status: COMPLETED | OUTPATIENT
Start: 2024-06-20 | End: 2024-06-20

## 2024-06-20 RX ADMIN — EPHEDRINE SULFATE 5 MG: 50 INJECTION INTRAVENOUS at 13:25:00

## 2024-06-20 RX ADMIN — ONDANSETRON 4 MG: 2 INJECTION INTRAMUSCULAR; INTRAVENOUS at 13:41:00

## 2024-06-20 RX ADMIN — PHENYLEPHRINE HCL 50 MCG: 10 MG/ML VIAL (ML) INJECTION at 13:23:00

## 2024-06-20 RX ADMIN — PHENYLEPHRINE HCL 50 MCG: 10 MG/ML VIAL (ML) INJECTION at 13:30:00

## 2024-06-20 RX ADMIN — BUPIVACAINE HYDROCHLORIDE 1.6 ML: 7.5 INJECTION, SOLUTION INTRASPINAL at 13:17:00

## 2024-06-20 RX ADMIN — SODIUM CHLORIDE, SODIUM LACTATE, POTASSIUM CHLORIDE, CALCIUM CHLORIDE: 600; 310; 30; 20 INJECTION, SOLUTION INTRAVENOUS at 13:12:00

## 2024-06-20 RX ADMIN — MORPHINE SULFATE 0.2 MG: 2 INJECTION, SOLUTION INTRAMUSCULAR; INTRAVENOUS at 13:17:00

## 2024-06-20 NOTE — L&D DELIVERY NOTE
Carlos, Girl [[id number]]      Labor Events     labor?: No   steroids?: None  Antibiotics received during labor?: Yes  Antibiotics (enter # doses in comment): cefazolin  Rupture date/time: 2024 1339     Rupture type: AROM  Fluid color: Clear  Intrapartum & labor complications: None       Labor Length    3rd stage: 0h 01m        Presentation    Presentation: Vertex  Position: Left Occiput Anterior       Operative Delivery    Operative Vaginal Delivery: N/A                Shoulder Dystocia    Shoulder Dystocia: N/A       Anesthesia    Method: Spinal              [place] Delivery      Delivery date/time:  24 13:40:00   Delivery type: Caesarean Section    Details:  Trial of labor after : No    categorization: Repeat    priority: scheduled   Indications for : Prior Uterine Surgery   Skin incision type: 1 Pfannenstiel   Uterine Incision type: Low Transverse      Delivery location: OR       Delivery Providers    Delivering Clinician: Mary Anne Andrade MD   Delivery personnel:  Provider Role   Anjali Badillo, RN Baby Nurse   Yoana Jasmine RN Delivery Nurse   Kelly Dwyer MD Neonatologist             Cord    Vessels: 3 Vessels  Complications: None  Timed cord clamping: Yes  Time in sec: 30  Cord blood disposition: to lab  Gases sent?: No       Resuscitation    Method: Suctioning, Oxygen        Measurements      Weight: 3870 g 8 lb 8.5 oz Length: 51.4 cm     Head circum.: 35 cm Chest circum.: 34.5 cm      Abdominal circum.: 34 cm           Placenta    Date/time: 2024 1341  Removal: Manual Removal  Appearance: Intact  Disposition: held for future pathology       Apgars    Living status: Living   Apgar Scoring Key:    0 1 2    Skin color Blue or pale Acrocyanotic Completely pink    Heart rate Absent <100 bpm >100 bpm    Reflex irritability No response Grimace Cry or active withdrawal    Muscle tone Limp Some  flexion Active motion    Respiratory effort Absent Weak cry; hypoventilation Good, crying              1 Minute:  5 Minute:  10 Minute:  15 Minute:  20 Minute:      Skin color: 1  1       Heart rate: 2  2       Reflex irritablity: 2  2       Muscle tone: 2  2       Respiratory effort: 1  2       Total: 8  9          Apgars assigned by: DR DESAI   disposition: with mother       Skin to Skin    Skin to skin initiated date/time: 2024 1440  Skin to skin with: Mother       Vaginal Count    No data filed       Lacerations    Episiotomy: None  Perineal lacerations: None      Vaginal laceration?: No      Cervical laceration?: No    Clitoral laceration?: No    Quantitative blood loss (mL): 762

## 2024-06-20 NOTE — OPERATIVE REPORT
[facility]  Post-Op  Procedure Note    Tristin Gonzalez Patient Status:  Inpatient    1990 MRN [id number]   Location [facility] LABOR & DELIVERY Attending Mary Anne Andrade*   Hosp Day # 0 PCP No primary care provider on file.       Preop diagnosis: 37 week 1 day intrauterine pregnancy. Repeat  section for prior utereine surgery.  Type 1 DM  AMA  Pre eclampsia  Prior 2 LTCS    Postop diagnosis: Same as preop, Delivered.    Procedure: Low transverse  delivery    Surgeon: Raymond RODRIGUEZ    Assistant: Dr. Santos     Anesthesia: Spinal     Anesthesiologist: No responsible provider has been recorded for the case.     Indication for the procedure: Briefly the patient is 33 year  old G 3   presenting for scheduled repeat csection    Findings: Viable female infant weighing 8 # 9Oz  with Apgars 8/9        Placenta delivered spontaneously and intact. Tubes and ovaries WNL bilaterally.    Specimens: Cord blood    QBL: 762 cc    IVF: 1200 cc    Urine Out put: 400 cc    Complications: None    Disposition: Recovery room in stable condition.    Procedure:   After insuring informed consent, the patient was taken to the operating room where anesthesia was administered and found to be adequate.  She was placed in the supine position with a left lateral tilt. The abdomen was prepped and draped in the usual sterile fashion. Time out was carried out in standard manner.   A Pfannenstiel skin incision was made with a scalpel and her old scar was removed. This was taken down to the underlying fascia. The fascia was incised and extended bilaterally with the elizabeth scissors. Kocher clamps were placed on the fascial edges. While tenting up on the fascia, the rectus muscles were dissected off the fascia using sharp and blunt dissection. The rectus muscles were  at the midline. The peritoneum was entered and extended bluntly. The bladder blade was used to retract the bladder. The  bladder was advanced on to the lower uterine segment.   The vesicouterine peritoneum was identified and grasped with pickups. The vesicouterine peritoneum was entered sharply and extended bilaterally with metzanbaum scissors. The bladder blade was repositioned to retract the bladder.   A low transverse uterine incision was made with the scalpel. The incision was extended with blunt stretching in the cephalad-caudal direction. The membranes were ruptured. The fetal head was delivered through the uterine incision without difficulty. Shoulders and torso followed easily. The cord was clamped and cut after 30 sec. The infant was brought to the warmer to the awaiting neonatology team.   The placenta delivered spontaneously and intact. The uterus was exteriorized and cleared of all clots and debris. The uterine incision was closed with #1 Monocryl in a running-locking fashion. Figure of eight suture was placed to obtain hemostasis.  Good hemostasis was confirmed at the level of the uterus. The uterus was returned to the abdomen. Gutters were cleared of all the clots and debris. There noted to be some bleeding on the bladder dome, which was controlled with figure of eight stitch.   The pelvis was reexamined for hemostasis. The peritoneum was closed with 3-0 vicryl in a running fashion. The fascia was closed with #1 vicryl in a running fashion.  The subcutaneous layer was closed with 3-0 vicryl. The skin was closed with 3-0 monocryl in a subcuticular manner. Surgical glue was applied as dressing.   All sponge, lap, needle and instrument counts were correct times two. The patient tolerated the procedure well and was taken to the recovery room in stable condition. She received antibiotics for surgical prophylaxis.      Mary Anen Andrade MD  6/20/2024

## 2024-06-20 NOTE — PROGRESS NOTES
Pt is a 33 year old female admitted to [facility]     Chief Complaint   Patient presents with    Scheduled       Pt is  37w1d intra-uterine pregnancy.  History obtained. Oriented to room, staff, and plan of care.

## 2024-06-20 NOTE — PLAN OF CARE
Problem: GASTROINTESTINAL - ADULT  Goal: Minimal or absence of nausea and vomiting  Description: INTERVENTIONS:  - Maintain adequate hydration with IV or PO as ordered and tolerated  - Nasogastric tube to low intermittent suction as ordered  - Evaluate effectiveness of ordered antiemetic medications  - Provide nonpharmacologic comfort measures as appropriate  - Advance diet as tolerated, if ordered  - Obtain nutritional consult as needed  - Evaluate fluid balance  6/20/2024 1852 by Beatrice Dorado RN  Outcome: Progressing  6/20/2024 1852 by Beatrice Dorado RN  Outcome: Progressing  Goal: Maintains or returns to baseline bowel function  Description: INTERVENTIONS:  - Assess bowel function  - Maintain adequate hydration with IV or PO as ordered and tolerated  - Evaluate effectiveness of GI medications  - Encourage mobilization and activity  - Obtain nutritional consult as needed  - Establish a toileting routine/schedule  - Consider collaborating with pharmacy to review patient's medication profile  6/20/2024 1852 by Beatrice Dorado RN  Outcome: Progressing  6/20/2024 1852 by Beatrice Dorado RN  Outcome: Progressing  Goal: Maintains adequate nutritional intake (undernourished)  Description: INTERVENTIONS:  - Monitor percentage of each meal consumed  - Identify factors contributing to decreased intake, treat as appropriate  - Assist with meals as needed  - Monitor I&O, WT and lab values  - Obtain nutritional consult as needed  - Optimize oral hygiene and moisture  - Encourage food from home; allow for food preferences  - Enhance eating environment  6/20/2024 1852 by Beatrice Dorado RN  Outcome: Progressing  6/20/2024 1852 by Beatrice Dorado RN  Outcome: Progressing  Goal: Achieves appropriate nutritional intake (bariatric)  Description: INTERVENTIONS:  - Monitor for over-consumption  - Identify factors contributing to increased intake, treat as appropriate  - Monitor I&O, WT and lab values  - Obtain nutritional consult as  needed  - Evaluate psychosocial factors contributing to over-consumption  6/20/2024 1852 by Beatrice Dorado RN  Outcome: Progressing  6/20/2024 1852 by Beatrice Dorado RN  Outcome: Progressing     Problem: POSTPARTUM  Goal: Long Term Goal:Experiences normal postpartum course  Description: INTERVENTIONS:  - Assess and monitor vital signs and lab values.  - Assess fundus and lochia.  - Provide ice/sitz baths for perineum discomfort.  - Monitor healing of incision/episiotomy/laceration, and assess for signs and symptoms of infection and hematoma.  - Assess bladder function and monitor for bladder distention.  - Provide/instruct/assist with pericare as needed.  - Provide VTE prophylaxis as needed.  - Monitor bowel function.  - Encourage ambulation and provide assistance as needed.  - Assess and monitor emotional status and provide social service/psych resources as needed.  - Utilize standard precautions and use personal protective equipment as indicated. Ensure aseptic care of all intravenous lines and invasive tubes/drains.  - Obtain immunization and exposure to communicable diseases history.  6/20/2024 1852 by Beatrice Dorado RN  Outcome: Progressing  6/20/2024 1852 by Beatrice Dorado RN  Outcome: Progressing  Goal: Optimize infant feeding at the breast  Description: INTERVENTIONS:  - Initiate breast feeding within first hour after birth.   - Monitor effectiveness of current breast feeding efforts.  - Assess support systems available to mother/family.  - Identify cultural beliefs/practices regarding lactation, letdown techniques, maternal food preferences.  - Assess mother's knowledge and previous experience with breast feeding.  - Provide information as needed about early infant feeding cues (e.g., rooting, lip smacking, sucking fingers/hand) versus late cue of crying.  - Discuss/demonstrate breast feeding aids (e.g., infant sling, nursing footstool/pillows, and breast pumps).  - Encourage mother/other family members to  express feelings/concerns, and actively listen.  - Educate father/SO about benefits of breast feeding and how to manage common lactation challenges.  - Recommend avoidance of specific medications or substances incompatible with breast feeding.  - Assess and monitor for signs of nipple pain/trauma.  - Instruct and provide assistance with proper latch.  - Review techniques for milk expression (breast pumping) and storage of breast milk. Provide pumping equipment/supplies, instructions and assistance, as needed.  - Encourage rooming-in and breast feeding on demand.  - Encourage skin-to-skin contact.  - Provide LC support as needed.  - Assess for and manage engorgement.  - Provide breast feeding education handouts and information on community breast feeding support.   2024 by Beatrice Dorado RN  Outcome: Progressing  2024 by Beatrice Dorado RN  Outcome: Progressing  Goal: Establishment of adequate milk supply with medication/procedure interruptions  Description: INTERVENTIONS:  - Review techniques for milk expression (breast pumping).   - Provide pumping equipment/supplies, instructions, and assistance until it is safe to breastfeed infant.  2024 by Beatrice Dorado RN  Outcome: Progressing  2024 by Beatrice Dorado RN  Outcome: Progressing  Goal: Experiences normal breast weaning course  Description: INTERVENTIONS:  - Assess for and manage engorgement.  - Instruct on breast care.  - Provide comfort measures.  2024 by Beatrice Dorado RN  Outcome: Progressing  2024 by Beatrice Dorado RN  Outcome: Progressing  Goal: Appropriate maternal -  bonding  Description: INTERVENTIONS:  - Assess caregiver- interactions.  - Assess caregiver's emotional status and coping mechanisms.  - Encourage caregiver to participate in  daily care.  - Assess support systems available to mother/family.  - Provide /case management support as needed.  2024  1852 by Beatrice Dorado, RN  Outcome: Progressing  6/20/2024 1852 by Beatrice Dorado, RN  Outcome: Progressing

## 2024-06-20 NOTE — ANESTHESIA PROCEDURE NOTES
Spinal Block    Date/Time: 6/20/2024 1:17 PM    Performed by: Les Fink MD  Authorized by: Les Fink MD      General Information and Staff    Start Time:  6/20/2024 1:17 PM  End Time:  6/20/2024 1:18 PM  Anesthesiologist:  Les Fink MD  Performed by:  Anesthesiologist  Patient Location:  OB  Site identification: surface landmarks    Preanesthetic Checklist: patient identified, IV checked, risks and benefits discussed, monitors and equipment checked, pre-op evaluation, timeout performed, anesthesia consent and sterile technique used      Procedure Details    Patient Position:  Sitting  Prep: ChloraPrep    Monitoring:  Cardiac monitor, heart rate and continuous pulse ox  Approach:  Midline  Location:  L3-4  Injection Technique:  Single-shot    Needle    Needle Type:  Sprotte  Needle Gauge:  24 G  Needle Length:  3.5 in    Assessment    Sensory Level:  T6  Events: clear CSF, CSF aspirated, well tolerated and blood negative      Additional Comments       refilled

## 2024-06-20 NOTE — PLAN OF CARE
Problem: BIRTH - VAGINAL/ SECTION  Goal: Fetal and maternal status remain reassuring during the birth process  Description: INTERVENTIONS:  - Monitor vital signs  - Monitor fetal heart rate  - Monitor uterine activity  - Monitor labor progression (vaginal delivery)  - DVT prophylaxis (C/S delivery)  - Surgical antibiotic prophylaxis (C/S delivery)  Outcome: Completed     Problem: PAIN - ADULT  Goal: Verbalizes/displays adequate comfort level or patient's stated pain goal  Description: INTERVENTIONS:  - Encourage pt to monitor pain and request assistance  - Assess pain using appropriate pain scale  - Administer analgesics based on type and severity of pain and evaluate response  - Implement non-pharmacological measures as appropriate and evaluate response  - Consider cultural and social influences on pain and pain management  - Manage/alleviate anxiety  - Utilize distraction and/or relaxation techniques  - Monitor for opioid side effects  - Notify MD/LIP if interventions unsuccessful or patient reports new pain  - Anticipate increased pain with activity and pre-medicate as appropriate  Outcome: Completed     Problem: ANXIETY  Goal: Will report anxiety at manageable levels  Description: INTERVENTIONS:  - Administer medication as ordered  - Teach and rehearse alternative coping skills  - Provide emotional support with 1:1 interaction with staff  Outcome: Completed     Problem: Patient/Family Goals  Goal: Patient/Family Long Term Goal  Description: Patient's Long Term Goal: Pain adequately controlled     Interventions:  -   - See additional Care Plan goals for specific interventions  Outcome: Progressing  Goal: Patient/Family Short Term Goal  Description: Patient's Short Term Goal: Safe surgery     Interventions:   -   - See additional Care Plan goals for specific interventions  Outcome: Progressing

## 2024-06-20 NOTE — PLAN OF CARE
Brief Endo Note    Consult received for T1DM management in postpartum patient, pt going for . Plan to discontinue pump during delivery - glucose management during delivery/OR per OB/MFM.    Postpartum profile was programmed into patient's Tslim pump prior to delivery as follows:    Basal:   MN 0.60 u/hr   0630a 0.90 u/hr   10p 0.650 u/hr     Bolus:   ICR: 1:15   ISF 1:65   Target 110   Active insulin time: 5h     Patient may switch into this profile and self-manage pump as long as she is able to following delivery. If an insulin drip is utilized during delivery, recommend overlapping pump and drip by two hours. Pump orders placed already and nursing informed.     If pt is not out of surgery/recovery by 4PM she will be seen formally tomorrow morning.     Lidia Yusuf DO  Endocrinology

## 2024-06-20 NOTE — ANESTHESIA PREPROCEDURE EVALUATION
PRE-OP EVALUATION    Patient Name: Tristin Gonzalez    Admit Diagnosis: Pregnancy (HCC)    Pre-op Diagnosis: 37 week 1 day intrauterine pregnancy. Repeat  section for prior utereine surgery.     SECTION    Anesthesia Procedure:  SECTION (Abdomen)    Surgeons and Role:     * Mary Anne Andrade MD - Primary     * Cynthia Santos MD, MD - Assisting Surgeon    Pre-op vitals reviewed.  Temp: 98 °F (36.7 °C)  Pulse: 61  Resp: 18  BP: 142/81     Body mass index is 26.95 kg/m².    Current medications reviewed.  Hospital Medications:   [COMPLETED] lactated ringers IV bolus 1,000 mL  1,000 mL Intravenous Once    Followed by    lactated ringers infusion  125 mL/hr Intravenous Continuous    [COMPLETED] acetaminophen (Tylenol Extra Strength) tab 1,000 mg  1,000 mg Oral Once    ondansetron (Zofran) 4 MG/2ML injection 4 mg  4 mg Intravenous Q6H PRN    sodium citrate-citric acid (Bicitra) 500-334 MG/5ML oral solution 30 mL  30 mL Oral Once    oxyTOCIN in sodium chloride 0.9% (Pitocin) 30 Units/500mL infusion premix  62.5-900 shi-units/min Intravenous Continuous    ceFAZolin (Ancef) 2g in 10mL IV syringe premix  2 g Intravenous Once    insulin via Insulin Pump   Subcutaneous TID AC and HS    glucose (Dex4) 15 GM/59ML oral liquid 15 g  15 g Oral Q15 Min PRN    Or    glucose (Glutose) 40% oral gel 15 g  15 g Oral Q15 Min PRN    Or    glucose-vitamin C (Dex-4) chewable tab 4 tablet  4 tablet Oral Q15 Min PRN    Or    dextrose 50% injection 50 mL  50 mL Intravenous Q15 Min PRN    Or    glucose (Dex4) 15 GM/59ML oral liquid 30 g  30 g Oral Q15 Min PRN    Or    glucose (Glutose) 40% oral gel 30 g  30 g Oral Q15 Min PRN    Or    glucose-vitamin C (Dex-4) chewable tab 8 tablet  8 tablet Oral Q15 Min PRN       Outpatient Medications:     Medications Prior to Admission   Medication Sig Dispense Refill Last Dose    famotidine 10 MG Oral Tab Take 1 tablet (10 mg total) by mouth 2 (two) times daily.   2024     insulin lispro (HUMALOG) 100 UNIT/ML Injection Solution Used to fill insulin pump with max 70u daily.  3-month supply 70 mL 0 6/20/2024    prenatal vitamin with DHA 27-0.8-228 MG Oral Cap Take 1 capsule by mouth daily.   6/19/2024    cholecalciferol (VITAMIN D3) 125 MCG (5000 UT) Oral Cap Take 1 capsule (5,000 Units total) by mouth daily.   6/19/2024    aspirin 81 MG Oral Tab EC Take 1 tablet (81 mg total) by mouth daily.   Past Week    ferrous sulfate 325 (65 FE) MG Oral Tab EC Take 1 tablet (325 mg total) by mouth every other day.   6/19/2024    Continuous Blood Gluc Sensor (DEXCOM G7 SENSOR) Does not apply Misc 1 each Every 10 days. 9 each 1 6/19/2024    insulin glargine (LANTUS SOLOSTAR) 100 UNIT/ML Subcutaneous Solution Pen-injector Inject 17 Units into the skin nightly. To use in the event of pump failure.       Glucose Blood (CONTOUR NEXT TEST) In Vitro Strip Use to test BG up to four times daily. 400 strip 3     glucagon (GVOKE HYPOPEN 2-PACK) 1 MG/0.2ML Subcutaneous SUBQ injection Inject 0.2 mL (1 mg total) into the skin as needed. Patient trained on Gvoke. No substitutions 0.4 mL 1     Insulin Pen Needle (BD PEN NEEDLE CARMEN U/F) 32G X 4 MM Does not apply Misc Inject 1 Pen into the skin daily. Use as insulin pump back up       busPIRone 5 MG Oral Tab Take 2 tablets (10 mg total) by mouth daily.       OneTouch Delica Lancets 33G Does not apply Misc 1 Lancet by Finger stick route As Directed.       Glucose Blood (ONETOUCH VERIO) In Vitro Strip 1 each by Other route 4 (four) times daily.       Microlet Lancets Does not apply Misc 1 each by Other route TID & HS.          Allergies: Patient has no known allergies.      Anesthesia Evaluation    Patient summary reviewed.    Anesthetic Complications  (-) history of anesthetic complications         GI/Hepatic/Renal    Negative GI/hepatic/renal ROS.                             Cardiovascular    Negative cardiovascular ROS.    Exercise tolerance: good     MET:  >4                                           Endo/Other    Negative endo/other ROS.  (+) diabetes  type 1,                          Pulmonary    Negative pulmonary ROS.                       Neuro/Psych    Negative neuro/psych ROS.                                  Past Surgical History:   Procedure Laterality Date          Hc  section level i  2020    Hc  section level i  2017    [place] teeth removed       Social History     Socioeconomic History    Marital status:    Tobacco Use    Smoking status: Never    Smokeless tobacco: Never   Vaping Use    Vaping status: Never Used   Substance and Sexual Activity    Alcohol use: Yes     Alcohol/week: 1.0 standard drink of alcohol     Types: 1 Glasses of wine per week     Comment: 1 per week occasionally    Drug use: Never    Sexual activity: Yes     History   Drug Use Unknown     Available pre-op labs reviewed.  Lab Results   Component Value Date    WBC 7.8 2024    RBC 4.42 2024    HGB 12.3 2024    HCT 36.3 2024    MCV 82.1 2024    MCH 27.8 2024    MCHC 33.9 2024    RDW 13.7 2024    .0 (L) 2024     Lab Results   Component Value Date     2024    K 4.2 2024     2024    CO2 20.0 (L) 2024    BUN 13 2024    CREATSERUM 0.72 2024     (H) 2024     (H) 2024    CA 8.3 (L) 2024            Airway      Mallampati: I  Mouth opening: 3 FB  TM distance: 4 - 6 cm  Neck ROM: full Cardiovascular    Cardiovascular exam normal.  Rhythm: regular  Rate: normal  (-) murmur   Dental             Pulmonary    Pulmonary exam normal.  Breath sounds clear to auscultation bilaterally.               Other findings              ASA: 2   Plan: spinal  NPO status verified and patient meets guidelines.    Post-procedure pain management plan discussed with surgeon and patient.    Comment: Risks and benefits of neuraxial anesthesia  discussed with patient, including but not limited to bleeding, infection, and PDPH.  GA as backup anesthetic planned and discussed with patient.  Possible tooth/airway injury, complications with intubation and airway management discussed.    Plan/risks discussed with: patient                Present on Admission:  **None**

## 2024-06-20 NOTE — PROGRESS NOTES
Nursing admission note    Pt admitted via cart  ID bands are verified   Oriented to room  Safety precautions are initiated  Bed in low position  Call light in reach  IV infusing, placed on pump  [place] draining  Pt instructed to remain in bed and call for assistance.

## 2024-06-20 NOTE — PROGRESS NOTES
Patient transferred to mother/baby room 2218 per cart in stable condition with baby and personal belongings.  Accompanied by  and staff.  Report given to Beatrice mother/baby RN.

## 2024-06-20 NOTE — H&P
[facility]    PATIENT'S NAME: UMBERTO KHAN   ATTENDING PHYSICIAN: Mary Anne Andrade M.D.   PATIENT ACCOUNT#:   [id number]    LOCATION:    MEDICAL RECORD #:   [id number]       YOB: 1990  ADMISSION DATE:       2024    HISTORY AND PHYSICAL EXAMINATION    CHIEF COMPLAINT:  Patient presenting for scheduled repeat .    HISTORY OF PRESENT ILLNESS:  Briefly, the patient is 33-year-old, G3, P1-1-0-2, at 37 weeks and 1 day of gestation, presenting for scheduled repeat  given her prior , with a recent diagnosis of gestational hypertension with preeclampsia.  The patient has been seen in our practice since the beginning of the pregnancy.  Her pregnancy has been complicated with a history of type 1 diabetes mellitus diagnosed at the age of 6, AMA, prior  due to placental abruption.  She also had mild anemia and was started with iron.  For the diabetes and AMA, patient had a level 2 ultrasound which was normal, and she was monitored with a growth ultrasound in the third trimester, had non-antepartum testing as well.  She is managing her sugars with her endocrinologist and is on insulin pump.    Her prenatal labs:  Her first trimester labs, hemoglobin was 11, platelets 316.  She is AB positive.  Antibody screen negative.  RPR nonreactive.  Hepatitis B nonreactive.  HIV nonreactive.  Rubella immune.  Gonorrhea, chlamydia negative.  Hemoglobin A1c was 6.60.  Vitamin D was 16.4.  TSH was 3.67.  Third trimester: Her antibody screen was negative.  RPR nonreactive.  HIV nonreactive.  Hemoglobin A1c 5.80.  Vitamin D 28.40.  Her group B strep is pending.  Her last ultrasound for growth at 35 weeks, EFW approximately 93rd percentile.    OBSTETRICAL HISTORY:  On 2017, she had a 35-week male, 6 pounds 14 ounces, .  It was emergency  due to abruption with type 1 diabetes.  Second pregnancy, 2020, 37 weeks, 8 pounds 12 ounces, male, delivered  at 37 weeks due to high blood pressures, type 1 diabetes.  Third pregnancy, present pregnancy.    GYNECOLOGICAL HISTORY:  Menarche at the age of 11.  Cycles regular.  She denies any history of abnormal Pap smear.  Her last Pap smear was 2023 which was normal.    PAST MEDICAL HISTORY:  Type 1 diabetes since , anemia, ADHD, vitamin D deficiency.    PAST SURGICAL HISTORY:  History of  x2, wisdom teeth removal.    MEDICATIONS:  She is on prenatal vitamin, vitamin D.  She is on iron supplement and Humalog insulin pump.    ALLERGIES:  No known drug allergies.  She denies any latex allergy.    IMMUNIZATIONS:  She has received Tdap.    SOCIAL HISTORY:  She denies smoking, alcohol, or recreational drug use during pregnancy.    REVIEW OF SYSTEMS:  Negative except as in the HPI.      PHYSICAL EXAMINATION:    VITAL SIGNS:  Stable.  Blood pressure 130/80.  Weight 164 pounds.  HEENT:  Grossly normal.  NECK:  Supple.  LUNGS:  Clear to auscultation bilaterally.  HEART:  S1, S2 heard.   ABDOMEN:  Gravid.  Fetal heart tone reactive.  [place]: No contractions.    ASSESSMENT:    1.   A 33-year-old, G3, P1-1-0-2, at 37 weeks and 1 day of gestation with type 1 diabetes mellitus.  2.   Gestational hypertension with preeclampsia with out severe features.  3.   Prior  section X 2   4.   Advanced maternal age.    PLAN:  Given her gestational hypertension/preeclapmsia diagnosis with a prior , the plan is to proceed with a repeat .  She had originally wanted to do the bilateral salpingectomy.  However, with the recent appointment, she declined as her  has had vasectomy.  She has been explained about the surgical risks, benefits, and complications including, but not limited to, infection, bleeding, damage to surrounding structures, and the consent has been obtained for the procedure.     Dictated By Mary Anne Andrade M.D.  d: 2024 21:53:52  t: 2024  23:18:23  [facility] [id number]  /

## 2024-06-20 NOTE — ANESTHESIA POSTPROCEDURE EVALUATION
[facility]    Tristin LULÚ Carlos Patient Status:  Inpatient   Age/Gender 33 year old female MRN [id number]   Location [facility] LABOR & DELIVERY Attending Mary Anne Andrade*   Hosp Day # 0 PCP No primary care provider on file.       Anesthesia Post-op Note     SECTION    Procedure Summary       Date: 24 Room / Location:  L+D OR  /  L+D OR    Anesthesia Start: 1312 Anesthesia Stop: 1425    Procedure:  SECTION (Abdomen) Diagnosis: (Same)    Surgeons: Mary Anne Andrade MD Anesthesiologist: Les Fink MD    Anesthesia Type: spinal ASA Status: Not recorded            Anesthesia Type: spinal    Vitals Value Taken Time   /67 24 1452   Temp 98 24 1452   Pulse 78 24 1452   Resp 18 24 1452   SpO2 99 24 1452       Patient Location: Labor and Delivery    Anesthesia Type: spinal    Airway Patency: patent    Postop Pain Control: adequate    Mental Status: preanesthetic baseline    Nausea/Vomiting: none    Cardiopulmonary/Hydration status: stable euvolemic    Complications: no apparent anesthesia related complications    Postop vital signs: stable    Dental Exam: Unchanged from Preop    Patient to be discharged from PACU when criteria met.

## 2024-06-20 NOTE — CONSULTS
[facility]   part of [facility]    Report of Consultation    Tristin LULÚ Greers Patient Status:  Inpatient    1990 MRN [id number]   Location [facility] LABOR & DELIVERY Attending Mary Anne Andrade*   Hosp Day # 0 PCP No primary care provider on file.     Date of Admission:  2024  Date of Consult:  24    Reason for Consultation:   Type 1 diabetic with insulin pump admitted for     History of Present Illness:   Patient is a 33 year old female with PMHx significant for type 1 diabetes complicated by retinopathy, on Tandem insulin pump who was admitted to the hospital for  (pt's third delivery). Pt is s/p uncomplicated  on . Patient was following with YEMI Ortiz with [facility] as an outpatient throughout her pregnancy. Glucose was overall well-managed throughout pregnancy. Pump/CGM suspended during delivery and resumed postpartum. Pt overall doing well and self-managing pump. Reports she is still struggling with nausea post-operatively, but not vomiting. Turned off Control IQ as she had a near-low at night and reports she is allowing herself to run a little high as she feels less nauseous. Remains on labetalol for BP management. No other acute complaints.    Past Medical History  Past Medical History:    Insulin pump in place    Type 1 diabetes mellitus (HCC)       Past Surgical History  Past Surgical History:   Procedure Laterality Date          Hc  section level i  2020      section level i  2017    [place] teeth removed         Family History  Family History   Problem Relation Age of Onset    Other (multiple sclerosis) Mother     Other (Hemochromatosis) Father     Asthma Brother     Colon Cancer Maternal Grandmother     Other (CABG) Maternal Grandfather     Dementia Maternal Grandfather     Other (Coronary artery disease) Maternal Grandfather     Colon Cancer Paternal Grandmother 70    Blood Cancer  Paternal Grandfather 70        Lymphoma?       Social History  Social History     Socioeconomic History    Marital status:    Tobacco Use    Smoking status: Never    Smokeless tobacco: Never   Vaping Use    Vaping status: Never Used   Substance and Sexual Activity    Alcohol use: Yes     Alcohol/week: 1.0 standard drink of alcohol     Types: 1 Glasses of wine per week     Comment: 1 per week occasionally    Drug use: Never    Sexual activity: Yes     Social Determinants of Health     Financial Resource Strain: Low Risk  (6/20/2024)    Financial Resource Strain     Difficulty of Paying Living Expenses: Not hard at all     Med Affordability: No   Food Insecurity: No Food Insecurity (6/20/2024)    Food Insecurity     Food Insecurity: Never true   Transportation Needs: No Transportation Needs (6/20/2024)    Transportation Needs     Lack of Transportation: No     Car Seat: Yes   Stress: No Stress Concern Present (6/20/2024)    Stress     Feeling of Stress : No   Housing Stability: Low Risk  (6/20/2024)    Housing Stability     Housing Instability: No     Crib or [place]: Yes           Current Medications:  Current Facility-Administered Medications   Medication Dose Route Frequency    lactated ringers infusion  125 mL/hr Intravenous Continuous    ondansetron (Zofran) 4 MG/2ML injection 4 mg  4 mg Intravenous Q6H PRN    sodium citrate-citric acid (Bicitra) 500-334 MG/5ML oral solution 30 mL  30 mL Oral Once    oxyTOCIN in sodium chloride 0.9% (Pitocin) 30 Units/500mL infusion premix  62.5-900 shi-units/min Intravenous Continuous    ceFAZolin (Ancef) 2g in 10mL IV syringe premix  2 g Intravenous Once    insulin via Insulin Pump   Subcutaneous TID AC and HS    glucose (Dex4) 15 GM/59ML oral liquid 15 g  15 g Oral Q15 Min PRN    Or    glucose (Glutose) 40% oral gel 15 g  15 g Oral Q15 Min PRN    Or    glucose-vitamin C (Dex-4) chewable tab 4 tablet  4 tablet Oral Q15 Min PRN    Or    dextrose 50% injection 50 mL   50 mL Intravenous Q15 Min PRN    Or    glucose (Dex4) 15 GM/59ML oral liquid 30 g  30 g Oral Q15 Min PRN    Or    glucose (Glutose) 40% oral gel 30 g  30 g Oral Q15 Min PRN    Or    glucose-vitamin C (Dex-4) chewable tab 8 tablet  8 tablet Oral Q15 Min PRN     Medications Prior to Admission   Medication Sig    famotidine 10 MG Oral Tab Take 1 tablet (10 mg total) by mouth 2 (two) times daily.    insulin lispro (HUMALOG) 100 UNIT/ML Injection Solution Used to fill insulin pump with max 70u daily.  3-month supply    prenatal vitamin with DHA 27-0.8-228 MG Oral Cap Take 1 capsule by mouth daily.    cholecalciferol (VITAMIN D3) 125 MCG (5000 UT) Oral Cap Take 1 capsule (5,000 Units total) by mouth daily.    aspirin 81 MG Oral Tab EC Take 1 tablet (81 mg total) by mouth daily.    ferrous sulfate 325 (65 FE) MG Oral Tab EC Take 1 tablet (325 mg total) by mouth every other day.    Continuous Blood Gluc Sensor (DEXCOM G7 SENSOR) Does not apply Misc 1 each Every 10 days.    insulin glargine (LANTUS SOLOSTAR) 100 UNIT/ML Subcutaneous Solution Pen-injector Inject 17 Units into the skin nightly. To use in the event of pump failure.    Glucose Blood (CONTOUR NEXT TEST) In Vitro Strip Use to test BG up to four times daily.    glucagon (GVOKE HYPOPEN 2-PACK) 1 MG/0.2ML Subcutaneous SUBQ injection Inject 0.2 mL (1 mg total) into the skin as needed. Patient trained on Gvoke. No substitutions    Insulin Pen Needle (BD PEN NEEDLE CARMEN U/F) 32G X 4 MM Does not apply Misc Inject 1 Pen into the skin daily. Use as insulin pump back up    busPIRone 5 MG Oral Tab Take 2 tablets (10 mg total) by mouth daily.    OneTouch Delica Lancets 33G Does not apply Misc 1 Lancet by Finger stick route As Directed.    Glucose Blood (ONETOUCH VERIO) In Vitro Strip 1 each by Other route 4 (four) times daily.    Microlet Lancets Does not apply Misc 1 each by Other route TID & HS.       Allergies  No Known Allergies    Review of Systems:   A 10 point review  of systems is completed with pertinent positives and negatives noted in HPI.    Physical Exam:   Vital Signs:  Blood pressure 141/83, pulse 71, temperature 98 °F (36.7 °C), temperature source Oral, resp. rate 18, height 5' 6\" (1.676 m), weight 167 lb (75.8 kg).     General: No acute distress. Alert and oriented x 3.  HEENT: Moist mucous membranes. EOM-I. PERRL  Neck: No lymphadenopathy. No thyromegaly.  Respiratory: Clear to auscultation bilaterally.  No wheezes. No rhonchi.  Cardiovascular: Regular rate and rhythm.  No murmurs. Equal pulses   Abdomen: S/P , incision dressed  Neurologic: No focal neurological deficits.  Musculoskeletal: Full range of motion of all extremities.  No swelling noted.  Integument: No lesions. No erythema.  Psychiatric: Appropriate mood and affect.    Results:     Laboratory Data:  Lab Results   Component Value Date    WBC 7.8 2024    HGB 12.3 2024    HCT 36.3 2024    .0 (L) 2024    CREATSERUM 0.73 2023    BUN 15 2023     2023    K 4.1 2023     2023    CO2 25.0 2023     (H) 2024    CA 8.9 2023    ALB 4.0 2023    ALKPHO 47 2023    TP 7.7 2023    AST 17 2023    ALT 12 (L) 2023    T4F 1.2 2024    TSH 3.050 2024         Recent Labs     23  0926 24  1028   T4F  --  1.2   TSH 3.060 3.050          Impression:     #Type 1 diabetes on Tandem insulin pump  #Pregnancy s/p  24  -Pt following closely with endocrinology [facility] outpatient for glucose management, overall well-managed  -Postpartum setting profile was programmed into Tandem pump prior to delivery date as follows:    Postpartum Tandem pump settings (Control IQ):   Basal:   MN 0.60 u/hr   0630a 0.90 u/hr   10p 0.650 u/hr     Bolus:   ICR: 1:15   ISF 1:65   Target 110   Active insulin time: 5h     Recommendations:  -Please ensure patient has all appropriate pump supplies  (insulin, infusion sets, CGMs)  -Pt may continue to self-manage Tandem pump, endocrinology is available to assist with setting changes PRN (pt often at higher risk for hypoglycemia postpartum due to increase in insulin sensitivity). The pt has changed her settings to run a little high intentionally as nausea is exacerbated by tighter control; recommended she not exceed 200 at maximum but okay with her running up to that as she is now postpartum. She is operating the pump in manual mode for the time being. Reports she has all necessary supplies with her.   -Hypoglycemia protocol  -If any pump malfunction or if patient is unable to manage pump, Tresiba 19 units should be administered stat    Discharge planning:  -Follow up: YEMI Ortiz ([facility])  -Pharmacotherapy: Humalog via Tandem Tslim, pump settings pending course    - Staff updated on plan and confirmed orders  - Family at bedside updated on plan and all questions answered  - will continue to follow     Inpatient endocrinology coverage is available M-F, 8AM-4PM. Management outside these hours and weekends per primary service.     Lidia Yusuf DO  [facility]

## 2024-06-21 DIAGNOSIS — O24.012: ICD-10-CM

## 2024-06-21 LAB
BASOPHILS # BLD AUTO: 0.02 X10(3) UL (ref 0–0.2)
BASOPHILS NFR BLD AUTO: 0.2 %
EOSINOPHIL # BLD AUTO: 0.01 X10(3) UL (ref 0–0.7)
EOSINOPHIL NFR BLD AUTO: 0.1 %
ERYTHROCYTE [DISTWIDTH] IN BLOOD BY AUTOMATED COUNT: 13.5 %
GLUCOSE BLD-MCNC: 123 MG/DL (ref 70–99)
GLUCOSE BLD-MCNC: 129 MG/DL (ref 70–99)
GLUCOSE BLD-MCNC: 184 MG/DL (ref 70–99)
GLUCOSE BLD-MCNC: 200 MG/DL (ref 70–99)
GLUCOSE BLD-MCNC: 94 MG/DL (ref 70–99)
HCT VFR BLD AUTO: 32.7 %
HGB BLD-MCNC: 11.2 G/DL
IMM GRANULOCYTES # BLD AUTO: 0.07 X10(3) UL (ref 0–1)
IMM GRANULOCYTES NFR BLD: 0.6 %
LYMPHOCYTES # BLD AUTO: 1.04 X10(3) UL (ref 1–4)
LYMPHOCYTES NFR BLD AUTO: 9.3 %
MCH RBC QN AUTO: 28.1 PG (ref 26–34)
MCHC RBC AUTO-ENTMCNC: 34.3 G/DL (ref 31–37)
MCV RBC AUTO: 82 FL
MONOCYTES # BLD AUTO: 0.51 X10(3) UL (ref 0.1–1)
MONOCYTES NFR BLD AUTO: 4.6 %
NEUTROPHILS # BLD AUTO: 9.52 X10 (3) UL (ref 1.5–7.7)
NEUTROPHILS # BLD AUTO: 9.52 X10(3) UL (ref 1.5–7.7)
NEUTROPHILS NFR BLD AUTO: 85.2 %
PLATELET # BLD AUTO: 129 10(3)UL (ref 150–450)
RBC # BLD AUTO: 3.99 X10(6)UL
WBC # BLD AUTO: 11.2 X10(3) UL (ref 4–11)

## 2024-06-21 PROCEDURE — 99253 IP/OBS CNSLTJ NEW/EST LOW 45: CPT | Performed by: STUDENT IN AN ORGANIZED HEALTH CARE EDUCATION/TRAINING PROGRAM

## 2024-06-21 RX ORDER — CALCIUM CARBONATE 500 MG/1
1000 TABLET, CHEWABLE ORAL 3 TIMES DAILY PRN
Status: DISCONTINUED | OUTPATIENT
Start: 2024-06-21 | End: 2024-06-23

## 2024-06-21 NOTE — PROGRESS NOTES
[facility] 2SW-J gemini Gonzalez Patient Status:  Inpatient   Age/Gender 33 year old female MRN [id number]   Location [facility] 2SW-J Attending Mary Anne Andrade*   Hosp Day # 1 PCP No primary care provider on file.      Anesthesia Pain Progress Note    Anesthesia Technique:   Spinal Anesthesia     Pain Management Technique:  In addition to available oral supplemental and IV medications  Patient received neuraxial preservative free morphine for post procedural pain control.    Post Procedure Pain Quality:    Adequate    Pain Management Side Effects:  Pruritis improving without treatment. and Nausea improving WITH medication.     /67 (BP Location: Left arm)   Pulse 60   Temp 98 °F (36.7 °C) (Oral)   Resp 14   Ht 1.676 m (5' 6\")   Wt 75.8 kg (167 lb)   SpO2 98%   Breastfeeding Yes   BMI 26.95 kg/m²       Injection Site: Injection site is intact on inspection     Complications from Pain Management or Anesthesia:   None    All patient questions were answered.  Follow up pain management is separate from intraoperative anesthetic needs.  Pain care is transitioned to primary service, with management by oral medications.    Thank you for asking us to participate in the care of your patient.    Chanel Burr MD, 24, 11:00 AM      Chanel Burr MD, 24, 11:00 AM

## 2024-06-21 NOTE — PROGRESS NOTES
POD1    S: doing well, passing flatus, pain is well managed, she still is having nausea but no vomiting, she is bottle feeding  O: /73 (BP Location: Left arm)   Pulse 71   Temp 98 °F (36.7 °C) (Oral)   Resp 14   Ht 5' 6\" (1.676 m)   Wt 167 lb (75.8 kg)   SpO2 99%   Breastfeeding Yes   BMI 26.95 kg/m²   Recent Labs   Lab 24  1007   RBC 4.42   HGB 12.3   HCT 36.3   MCV 82.1   MCH 27.8   MCHC 33.9   RDW 13.7   NEPRELIM 5.75   WBC 7.8   .0*     Glucose ranging from     I/O:  1751/4157  General : alert  and oriented x3  Respiratory: non labored breasthing  Abdomen: uterus- nt  Incision: no drainage and appears   Extremities: no evidence of dvt    A: s/p  section POD1      Elevated blood pressures- preeclampsia on labetalol 200 mg po bid      Nausea following surgery      Type 1 diabetes  P: continue current care with careful observation of bps       Endocrine to manage her blood sugars       Ambulate to the bathroom

## 2024-06-21 NOTE — PLAN OF CARE
Problem: GASTROINTESTINAL - ADULT  Goal: Minimal or absence of nausea and vomiting  Description: INTERVENTIONS:  - Maintain adequate hydration with IV or PO as ordered and tolerated  - Nasogastric tube to low intermittent suction as ordered  - Evaluate effectiveness of ordered antiemetic medications  - Provide nonpharmacologic comfort measures as appropriate  - Advance diet as tolerated, if ordered  - Obtain nutritional consult as needed  - Evaluate fluid balance  Outcome: Progressing  Goal: Maintains or returns to baseline bowel function  Description: INTERVENTIONS:  - Assess bowel function  - Maintain adequate hydration with IV or PO as ordered and tolerated  - Evaluate effectiveness of GI medications  - Encourage mobilization and activity  - Obtain nutritional consult as needed  - Establish a toileting routine/schedule  - Consider collaborating with pharmacy to review patient's medication profile  Outcome: Progressing  Goal: Maintains adequate nutritional intake (undernourished)  Description: INTERVENTIONS:  - Monitor percentage of each meal consumed  - Identify factors contributing to decreased intake, treat as appropriate  - Assist with meals as needed  - Monitor I&O, WT and lab values  - Obtain nutritional consult as needed  - Optimize oral hygiene and moisture  - Encourage food from home; allow for food preferences  - Enhance eating environment  Outcome: Progressing  Goal: Achieves appropriate nutritional intake (bariatric)  Description: INTERVENTIONS:  - Monitor for over-consumption  - Identify factors contributing to increased intake, treat as appropriate  - Monitor I&O, WT and lab values  - Obtain nutritional consult as needed  - Evaluate psychosocial factors contributing to over-consumption  Outcome: Progressing     Problem: POSTPARTUM  Goal: Long Term Goal:Experiences normal postpartum course  Description: INTERVENTIONS:  - Assess and monitor vital signs and lab values.  - Assess fundus and lochia.  -  Provide ice/sitz baths for perineum discomfort.  - Monitor healing of incision/episiotomy/laceration, and assess for signs and symptoms of infection and hematoma.  - Assess bladder function and monitor for bladder distention.  - Provide/instruct/assist with pericare as needed.  - Provide VTE prophylaxis as needed.  - Monitor bowel function.  - Encourage ambulation and provide assistance as needed.  - Assess and monitor emotional status and provide social service/psych resources as needed.  - Utilize standard precautions and use personal protective equipment as indicated. Ensure aseptic care of all intravenous lines and invasive tubes/drains.  - Obtain immunization and exposure to communicable diseases history.  Outcome: Progressing  Goal: Optimize infant feeding at the breast  Description: INTERVENTIONS:  - Initiate breast feeding within first hour after birth.   - Monitor effectiveness of current breast feeding efforts.  - Assess support systems available to mother/family.  - Identify cultural beliefs/practices regarding lactation, letdown techniques, maternal food preferences.  - Assess mother's knowledge and previous experience with breast feeding.  - Provide information as needed about early infant feeding cues (e.g., rooting, lip smacking, sucking fingers/hand) versus late cue of crying.  - Discuss/demonstrate breast feeding aids (e.g., infant sling, nursing footstool/pillows, and breast pumps).  - Encourage mother/other family members to express feelings/concerns, and actively listen.  - Educate father/SO about benefits of breast feeding and how to manage common lactation challenges.  - Recommend avoidance of specific medications or substances incompatible with breast feeding.  - Assess and monitor for signs of nipple pain/trauma.  - Instruct and provide assistance with proper latch.  - Review techniques for milk expression (breast pumping) and storage of breast milk. Provide pumping equipment/supplies,  instructions and assistance, as needed.  - Encourage rooming-in and breast feeding on demand.  - Encourage skin-to-skin contact.  - Provide LC support as needed.  - Assess for and manage engorgement.  - Provide breast feeding education handouts and information on community breast feeding support.   Outcome: Progressing  Goal: Establishment of adequate milk supply with medication/procedure interruptions  Description: INTERVENTIONS:  - Review techniques for milk expression (breast pumping).   - Provide pumping equipment/supplies, instructions, and assistance until it is safe to breastfeed infant.  Outcome: Progressing  Goal: Appropriate maternal -  bonding  Description: INTERVENTIONS:  - Assess caregiver- interactions.  - Assess caregiver's emotional status and coping mechanisms.  - Encourage caregiver to participate in  daily care.  - Assess support systems available to mother/family.  - Provide /case management support as needed.  Outcome: Progressing

## 2024-06-22 LAB
GLUCOSE BLD-MCNC: 110 MG/DL (ref 70–99)
GLUCOSE BLD-MCNC: 136 MG/DL (ref 70–99)
GLUCOSE BLD-MCNC: 75 MG/DL (ref 70–99)
GLUCOSE BLD-MCNC: 86 MG/DL (ref 70–99)

## 2024-06-22 NOTE — PROGRESS NOTES
Patient forgot to call for accucheck, used result from her monitor. Patient showed RN. 115 at noon time.

## 2024-06-22 NOTE — PROGRESS NOTES
POD2    S: she noticed some blurring of her vision  temporarily when trying to read , just not clear but has improved , no other symptoms, she denies HA or weakness, lochia minimal , pain is well managed, she is pumping  O: /68 (BP Location: Right arm)   Pulse 71   Temp 97.8 °F (36.6 °C) (Oral)   Resp 16   Ht 5' 6\" (1.676 m)   Wt 167 lb (75.8 kg)   SpO2 98%   Breastfeeding Yes   BMI 26.95 kg/m²     General: alert and oriented x3  Respiratory: non labored breathing  Abdomen: soft nt  Wound : well approximated,no drainage  Ext: no edema or evidence of dvt      Glucose; 120 -130s    A: s/p  section       Anemia       Elevated blood pressures - preeclampsia on labetalol 200 mg po bid       Type 1 diabetes being managed by endocrine  P:   She appears stable with her blood pressures, vitals and sugars and we discussed her visual changes        Since  this has improved we will monitor her and she will notify staff if any further changes or concerns. Discussed that this could be due to her labetalol as well.

## 2024-06-22 NOTE — PROGRESS NOTES
RN spoke with patient about eye problem. Patient informed nurse of having some problems with eyes reading close letters. Doctor Danielle phoned and made aware, is coming soon. BP is stable and no other problems voiced.

## 2024-06-22 NOTE — PLAN OF CARE
Problem: GASTROINTESTINAL - ADULT  Goal: Minimal or absence of nausea and vomiting  Description: INTERVENTIONS:  - Maintain adequate hydration with IV or PO as ordered and tolerated  - Nasogastric tube to low intermittent suction as ordered  - Evaluate effectiveness of ordered antiemetic medications  - Provide nonpharmacologic comfort measures as appropriate  - Advance diet as tolerated, if ordered  - Obtain nutritional consult as needed  - Evaluate fluid balance  Outcome: Completed  Goal: Maintains or returns to baseline bowel function  Description: INTERVENTIONS:  - Assess bowel function  - Maintain adequate hydration with IV or PO as ordered and tolerated  - Evaluate effectiveness of GI medications  - Encourage mobilization and activity  - Obtain nutritional consult as needed  - Establish a toileting routine/schedule  - Consider collaborating with pharmacy to review patient's medication profile  Outcome: Completed     Problem: POSTPARTUM  Goal: Long Term Goal:Experiences normal postpartum course  Description: INTERVENTIONS:  - Assess and monitor vital signs and lab values.  - Assess fundus and lochia.  - Provide ice/sitz baths for perineum discomfort.  - Monitor healing of incision/episiotomy/laceration, and assess for signs and symptoms of infection and hematoma.  - Assess bladder function and monitor for bladder distention.  - Provide/instruct/assist with pericare as needed.  - Provide VTE prophylaxis as needed.  - Monitor bowel function.  - Encourage ambulation and provide assistance as needed.  - Assess and monitor emotional status and provide social service/psych resources as needed.  - Utilize standard precautions and use personal protective equipment as indicated. Ensure aseptic care of all intravenous lines and invasive tubes/drains.  - Obtain immunization and exposure to communicable diseases history.  Outcome: Progressing  Goal: Optimize infant feeding at the breast  Description: INTERVENTIONS:  -  Initiate breast feeding within first hour after birth.   - Monitor effectiveness of current breast feeding efforts.  - Assess support systems available to mother/family.  - Identify cultural beliefs/practices regarding lactation, letdown techniques, maternal food preferences.  - Assess mother's knowledge and previous experience with breast feeding.  - Provide information as needed about early infant feeding cues (e.g., rooting, lip smacking, sucking fingers/hand) versus late cue of crying.  - Discuss/demonstrate breast feeding aids (e.g., infant sling, nursing footstool/pillows, and breast pumps).  - Encourage mother/other family members to express feelings/concerns, and actively listen.  - Educate father/SO about benefits of breast feeding and how to manage common lactation challenges.  - Recommend avoidance of specific medications or substances incompatible with breast feeding.  - Assess and monitor for signs of nipple pain/trauma.  - Instruct and provide assistance with proper latch.  - Review techniques for milk expression (breast pumping) and storage of breast milk. Provide pumping equipment/supplies, instructions and assistance, as needed.  - Encourage rooming-in and breast feeding on demand.  - Encourage skin-to-skin contact.  - Provide LC support as needed.  - Assess for and manage engorgement.  - Provide breast feeding education handouts and information on community breast feeding support.   Outcome: Progressing  Goal: Establishment of adequate milk supply with medication/procedure interruptions  Description: INTERVENTIONS:  - Review techniques for milk expression (breast pumping).   - Provide pumping equipment/supplies, instructions, and assistance until it is safe to breastfeed infant.  Outcome: Progressing  Goal: Appropriate maternal -  bonding  Description: INTERVENTIONS:  - Assess caregiver- interactions.  - Assess caregiver's emotional status and coping mechanisms.  - Encourage caregiver  to participate in  daily care.  - Assess support systems available to mother/family.  - Provide /case management support as needed.  Outcome: Progressing

## 2024-06-23 VITALS
OXYGEN SATURATION: 100 % | SYSTOLIC BLOOD PRESSURE: 142 MMHG | TEMPERATURE: 98 F | RESPIRATION RATE: 18 BRPM | BODY MASS INDEX: 26.84 KG/M2 | DIASTOLIC BLOOD PRESSURE: 81 MMHG | WEIGHT: 167 LBS | HEART RATE: 67 BPM | HEIGHT: 66 IN

## 2024-06-23 LAB — GLUCOSE BLD-MCNC: 85 MG/DL (ref 70–99)

## 2024-06-23 RX ORDER — IBUPROFEN 600 MG/1
600 TABLET ORAL EVERY 6 HOURS PRN
Qty: 30 TABLET | Refills: 1 | Status: SHIPPED | OUTPATIENT
Start: 2024-06-23

## 2024-06-23 RX ORDER — LABETALOL 200 MG/1
200 TABLET, FILM COATED ORAL 2 TIMES DAILY
Qty: 28 TABLET | Refills: 1 | Status: SHIPPED | OUTPATIENT
Start: 2024-06-23

## 2024-06-23 NOTE — PLAN OF CARE
Problem: POSTPARTUM  Goal: Long Term Goal:Experiences normal postpartum course  Description: INTERVENTIONS:  - Assess and monitor vital signs and lab values.  - Assess fundus and lochia.  - Provide ice/sitz baths for perineum discomfort.  - Monitor healing of incision/episiotomy/laceration, and assess for signs and symptoms of infection and hematoma.  - Assess bladder function and monitor for bladder distention.  - Provide/instruct/assist with pericare as needed.  - Provide VTE prophylaxis as needed.  - Monitor bowel function.  - Encourage ambulation and provide assistance as needed.  - Assess and monitor emotional status and provide social service/psych resources as needed.  - Utilize standard precautions and use personal protective equipment as indicated. Ensure aseptic care of all intravenous lines and invasive tubes/drains.  - Obtain immunization and exposure to communicable diseases history.  6/23/2024 1106 by Norma Jerry, RN  Outcome: Completed  6/23/2024 0744 by Norma Jerry, RN  Outcome: Progressing  Goal: Optimize infant feeding at the breast  Description: INTERVENTIONS:  - Initiate breast feeding within first hour after birth.   - Monitor effectiveness of current breast feeding efforts.  - Assess support systems available to mother/family.  - Identify cultural beliefs/practices regarding lactation, letdown techniques, maternal food preferences.  - Assess mother's knowledge and previous experience with breast feeding.  - Provide information as needed about early infant feeding cues (e.g., rooting, lip smacking, sucking fingers/hand) versus late cue of crying.  - Discuss/demonstrate breast feeding aids (e.g., infant sling, nursing footstool/pillows, and breast pumps).  - Encourage mother/other family members to express feelings/concerns, and actively listen.  - Educate father/SO about benefits of breast feeding and how to manage common lactation challenges.  - Recommend avoidance of specific  medications or substances incompatible with breast feeding.  - Assess and monitor for signs of nipple pain/trauma.  - Instruct and provide assistance with proper latch.  - Review techniques for milk expression (breast pumping) and storage of breast milk. Provide pumping equipment/supplies, instructions and assistance, as needed.  - Encourage rooming-in and breast feeding on demand.  - Encourage skin-to-skin contact.  - Provide LC support as needed.  - Assess for and manage engorgement.  - Provide breast feeding education handouts and information on community breast feeding support.   2024 by Norma Jerry RN  Outcome: Completed  2024 by Norma Jerry RN  Outcome: Progressing  Goal: Establishment of adequate milk supply with medication/procedure interruptions  Description: INTERVENTIONS:  - Review techniques for milk expression (breast pumping).   - Provide pumping equipment/supplies, instructions, and assistance until it is safe to breastfeed infant.  2024 by Norma Jerry RN  Outcome: Completed  2024 by Norma Jerry RN  Outcome: Progressing  Goal: Appropriate maternal -  bonding  Description: INTERVENTIONS:  - Assess caregiver- interactions.  - Assess caregiver's emotional status and coping mechanisms.  - Encourage caregiver to participate in  daily care.  - Assess support systems available to mother/family.  - Provide /case management support as needed.  2024 by Norma Jerry RN  Outcome: Completed  2024 by Norma Jerry RN  Outcome: Progressing

## 2024-06-23 NOTE — DISCHARGE PLANNING
Patient being dc home at this time. Patient provided dc instructions; pt verbalized understanding of dc instructions.  Patient being dc in stable condition.

## 2024-06-23 NOTE — PROGRESS NOTES
POD3    S: doing well, her visual changes were from the scopalamine patch and now resolved, she is nursing and denies HA ,  swelling  O: /81 (BP Location: Right arm)   Pulse 67   Temp 97.9 °F (36.6 °C) (Oral)   Resp 18   Ht 5' 6\" (1.676 m)   Wt 167 lb (75.8 kg)   SpO2 100%   Breastfeeding Yes   BMI 26.95 kg/m²   BPs have been less than 140s over 90s    General: alert and oriented x3  Respiratory: non labored breathing  Abdomen: soft , nt  Wound: dry and intact  Ext: minimal  edema bilaterally but no evidnece of dvt      A: s/p ltcs      Type  1 dm with sugars in the normal range       Gestational hypertension- on labetalol and with controlled bps       Stable  P: discharge instructions reviewed and she will fu in 2 weeks with us and continue to check bps at home and send them to us

## 2024-06-23 NOTE — DISCHARGE SUMMARY
[facility]  Discharge Summary    Tristin Gonzalez Patient Status:  Inpatient    1990 MRN [id number]   Location [facility] 2SW-J Attending Mary Anne Andrade*   Hosp Day # 3 PCP No primary care provider on file.     Date of Admission: 2024    Date of Discharge: 24      Admitting Diagnosis: Pregnancy (HCC)    Discharge Diagnosis:   Patient Active Problem List   Diagnosis    Type 1 diabetes mellitus (HCC)    Bilateral cataracts    Insulin pump in place    Uterine leiomyoma    Diabetic retinopathy associated with type 1 diabetes mellitus (HCC)    Pregnancy (HCC)       Reason for Admission: IUP at 37 weeks for repeat  section            Hospital Course: 32 y/o  who presented for scheduled repeat  section. She did well postpartum and did have some elevated blood pressures and was started on labetalol and felt to have gestational hypertension with preeclampsia without severe features. Her sugars postpartum were controlled and she had endocrine manage them as is a type 1 diabetic.   She went home with instructions and will check bps at home with her cuff and them send those to us.  Precautions reviewed with her.  She has a cuff at home. She was sent home with the baby,        Procedures: low transverse  section    Complications: none    Disposition: Final discharge disposition not confirmed    Discharge Condition: Stable    Discharge Medications:   Current Discharge Medication List        START taking these medications    Details   ibuprofen 600 MG Oral Tab Take 1 tablet (600 mg total) by mouth every 6 (six) hours as needed for Pain.  Qty: 30 tablet, Refills: 1      labetalol 200 MG Oral Tab Take 1 tablet (200 mg total) by mouth in the morning and 1 tablet (200 mg total) before bedtime.  Qty: 28 tablet, Refills: 1           CONTINUE these medications which have NOT CHANGED    Details   insulin lispro (HUMALOG) 100 UNIT/ML Injection Solution Used to fill insulin  pump with max 70u daily.  3-month supply  Qty: 70 mL, Refills: 0    Associated Diagnoses: Type 1 diabetes mellitus affecting pregnancy in second trimester, antepartum ([facility])      prenatal vitamin with DHA 27-0.8-228 MG Oral Cap Take 1 capsule by mouth daily.      cholecalciferol (VITAMIN D3) 125 MCG (5000 UT) Oral Cap Take 1 capsule (5,000 Units total) by mouth daily.      Continuous Blood Gluc Sensor (DEXCOM G7 SENSOR) Does not apply Misc 1 each Every 10 days.  Qty: 9 each, Refills: 1    Associated Diagnoses: Type 1 diabetes mellitus during pregnancy in second trimester ([facility])      insulin glargine (LANTUS SOLOSTAR) 100 UNIT/ML Subcutaneous Solution Pen-injector Inject 17 Units into the skin nightly. To use in the event of pump failure.    Associated Diagnoses: Type 1 diabetes mellitus without complication ([facility])      !! Glucose Blood (CONTOUR NEXT TEST) In Vitro Strip Use to test BG up to four times daily.  Qty: 400 strip, Refills: 3    Associated Diagnoses: Type 1 diabetes mellitus during pregnancy in first trimester ([facility])      glucagon (GVOKE HYPOPEN 2-PACK) 1 MG/0.2ML Subcutaneous SUBQ injection Inject 0.2 mL (1 mg total) into the skin as needed. Patient trained on Gvoke. No substitutions  Qty: 0.4 mL, Refills: 1      Insulin Pen Needle (BD PEN NEEDLE CARMEN U/F) 32G X 4 MM Does not apply Misc Inject 1 Pen into the skin daily. Use as insulin pump back up      !! OneTouch Delica Lancets 33G Does not apply Misc 1 Lancet by Finger stick route As Directed.      !! Glucose Blood (ONETOUCH VERIO) In Vitro Strip 1 each by Other route 4 (four) times daily.      !! Microlet Lancets Does not apply Misc 1 each by Other route TID & HS.       !! - Potential duplicate medications found. Please discuss with provider.        STOP taking these medications       famotidine 10 MG Oral Tab        aspirin 81 MG Oral Tab EC        ferrous sulfate 325 (65 FE) MG Oral Tab EC        busPIRone 5 MG Oral Tab            Diet: general diet  No  heavy lifting              Cynthia Santos MD  6/23/2024  10:18 AM

## 2024-06-24 RX ORDER — ACYCLOVIR 400 MG/1
TABLET ORAL
Qty: 9 EACH | Refills: 3 | Status: SHIPPED | OUTPATIENT
Start: 2024-06-24

## 2024-06-24 NOTE — TELEPHONE ENCOUNTER
LOV: 24     RTC: 2 months (around 24)     FU: scheduled 24     Last Refill: 24- 6 months total sent     Month Supply Pendin year

## 2024-06-26 ENCOUNTER — TELEPHONE (OUTPATIENT)
Dept: OBGYN UNIT | Facility: HOSPITAL | Age: 34
End: 2024-06-26

## 2024-07-03 DIAGNOSIS — O24.012: ICD-10-CM

## 2024-07-05 NOTE — TELEPHONE ENCOUNTER
LOV: 5/20/24    RTC: 2 months     FU: scheduled 8/20/24     Last Refill: 2/29/24- 3 month supply    Month Supply Pending: 3 months

## 2024-07-08 ENCOUNTER — TELEPHONE (OUTPATIENT)
Facility: CLINIC | Age: 34
End: 2024-07-08

## 2024-07-08 RX ORDER — INSULIN LISPRO 100 [IU]/ML
INJECTION, SOLUTION INTRAVENOUS; SUBCUTANEOUS
Qty: 70 ML | Refills: 3 | Status: SHIPPED | OUTPATIENT
Start: 2024-07-08

## 2024-07-08 NOTE — TELEPHONE ENCOUNTER
From Jacinta BRAGG, \"Please phone patient and confirm if she has discussed with surgeon if she is allowed to wear insulin pump during colonoscopy, if not can send her preop instructions to switch to MDI.\"  Attempt to phone patient, no answer, left message per RORY asking for call back or to respond via MCM.  MCM sent.

## 2024-07-08 NOTE — TELEPHONE ENCOUNTER
Received fax from [facility] as a courtesy notification of patients upcoming colonoscopy surgical procedure. Date scheduled is 09-10-24. Fax states no Pre-OP is needed and will be cleared by surgeon.     Routing to N.

## 2024-08-20 ENCOUNTER — OFFICE VISIT (OUTPATIENT)
Facility: CLINIC | Age: 34
End: 2024-08-20
Payer: COMMERCIAL

## 2024-08-20 VITALS
HEIGHT: 66 IN | WEIGHT: 136.63 LBS | SYSTOLIC BLOOD PRESSURE: 108 MMHG | BODY MASS INDEX: 21.96 KG/M2 | HEART RATE: 110 BPM | OXYGEN SATURATION: 98 % | DIASTOLIC BLOOD PRESSURE: 64 MMHG

## 2024-08-20 DIAGNOSIS — Z46.81 INSULIN PUMP TITRATION: ICD-10-CM

## 2024-08-20 DIAGNOSIS — E10.9 TYPE 1 DIABETES MELLITUS WITHOUT COMPLICATION (HCC): Primary | ICD-10-CM

## 2024-08-20 DIAGNOSIS — Z96.41 INSULIN PUMP IN PLACE: ICD-10-CM

## 2024-08-20 DIAGNOSIS — E10.3299 MILD NONPROLIFERATIVE DIABETIC RETINOPATHY WITHOUT MACULAR EDEMA ASSOCIATED WITH TYPE 1 DIABETES MELLITUS, UNSPECIFIED LATERALITY (HCC): ICD-10-CM

## 2024-08-20 LAB — HEMOGLOBIN A1C: 6 % (ref 4.3–5.6)

## 2024-08-20 PROCEDURE — 3044F HG A1C LEVEL LT 7.0%: CPT

## 2024-08-20 PROCEDURE — 3078F DIAST BP <80 MM HG: CPT

## 2024-08-20 PROCEDURE — 99214 OFFICE O/P EST MOD 30 MIN: CPT

## 2024-08-20 PROCEDURE — 3074F SYST BP LT 130 MM HG: CPT

## 2024-08-20 PROCEDURE — 3008F BODY MASS INDEX DOCD: CPT

## 2024-08-20 PROCEDURE — 83036 HEMOGLOBIN GLYCOSYLATED A1C: CPT

## 2024-08-20 PROCEDURE — 95251 CONT GLUC MNTR ANALYSIS I&R: CPT

## 2024-08-20 NOTE — PROGRESS NOTES
[facility] Note    Name: Tristin Gonzalez    Date: 8/20/2024    Tristin Gonzalez is a 34 year old female who presents for evaluation of T1DM management.     Chief complaint: Diabetes       Subjective:      Initial HPI consult in March 2023:   A1C 6.4%  Diagnosed age: 7y/o  Pt denies hx of hospitalization for DM and/or pancreatitis.  Family history of DM: None  Only has had DKA 1x possibly- unsure  Establishing care to be closer to home, prev pt of Dr Perrin in [place]  Using Tandem pump in BasalIQ, is due for pump upgrade but needed updated visit note     Interim hx  June 2023- A1C 6.4% today. Upgraded to control IQ pump in April 2023, liking it so far! Did have to change her settings from LOV, we changed from 100-->55 and was constantly snacking w/ this so incr'd back up to middle ground of 75. She does note that once she hits >200 her insulin resistance seems worse, otherwise very insulin sensitive. No pattern to hypo events, occurring very rarely.     Sept 2023- Last A1c value was 6.2% done 9/15/2023.  Current DM meds: Tandem pump settings (below)   Doing well overall, liking the pump changes we made. Is often manually entering carbs instead of using carb ratio; very busy w/ work and kids  Starting taking iron supplements, has more energy now. Also recently dx'd with ADHD, tried anxiety meds but wasn't a good fit     Nov 2023-  Newly pregnant, LMP 10/4/23, approx 8 wks pregnant, saw OB yesterday ([facility])  Has noticed higher BG readings the past few weeks, using more insulin  Previous pregnancy used Control IQ, prior to that was on medtronic pump     January 2024- w/ endo APN, re: DM. Last A1c value was 5.6% done 4/25/2024. Approx 17 wks pregnany.   Changes made at LOV include: pump settings continue to be adjusted qMonday in the setting of pregnancy.  Subjective updates since last office visit: Feeling well, noting more insulin resistance but also thinks she may have had a faulty site.  Some occasional lows. Sometimes overrides the suggested correction because feels it doesn't give enough. Has been feeling tired lately- on vit D, iron supplement, and started baby aspirin at 12 weeks. Unfortunately a lot of ongoing stressors, had a loss in the family so missed an appt.   Blood glucose updates: Checking with CGM, data below  DM meds at visit: Tandem Tslim in MANUAL mode, Dexcom G6     February 2024- w/ endo APN. Last A1c value was 5.7% done 1/31/2024.  Currently 21 weeks, had 20 wk scan & baby girl looked good. She is hoping to make it to 38 wks, planning on csection. Saw MFM. She is hoping to wear pump during Csection/self manage BG while admitted. States overall her energy is good  DM meds at visit: Tandem Tslim in MANUAL mode, Dexcom G6     March 2024-w/ Jacinta APN. Last A1c value was 5.7% done 1/31/2024. 25w1d today.   Had 24 wk check in last week. Switched from dexcom G6 --> G7; having spotty readings.  For example, random BG on fingerstick 183, on dexcom it's saying its 204 with two up arrows.  Otherwise, thinks carb ratio is going well. Curious if she can wear pump while delivering.  DM meds at visit: Tandem Tslim + MANUAL mode + Dexcom G7     April 2024-w/ Jacinta APN. Last A1c value was 5.6% done 4/25/2024. 29w1d today.   Documentation from MA: Follow - Up and Diabetes (PT here for routine f/u, c/o of BG running higher than before in past 3 weeks- 140-170). Continues with weekly Monday follow ups.   Overall has felt more difficulty managing BG over the last few weeks due to insulin resistance features. Feels like she has to override to get dose she wants. Notes after some carb doses going low. Feels overall mental health/burnout w/ DM is normal, just having a few ongoing life stressors (unfortunately her dog passed last week unexpectedly)  DM meds at visit: Tandem Tslim + MANUAL mode + Dexcom G7        May 2024-w/ Jacinta APN. Last A1c value was 5.6% done 4/25/2024. 32w5d today.   Chief complaint:  Diabetes Patient presents for follow up of Type 1 diabetes. She states she still feels the Dexcom G7 is not reading as accurately as she would like but no complaints otherwise. Had 32 week growth scan last week at [facility]. Baby is measuring large -- [id number]. Now going once/week for ultrasound and NSTs. Tentatively planning on 38 wk     2024-jennifer/ Jacinta BRAGG.Last A1c value was 6% done 2024. Since last office visit, delivered healthy baby girl Jackson. Now ~6 wks postpartum. Not breastfeeding. Had a period -. Did note BG were dropping during menses    DM meds at visit:  Tandem Tslim + Control IQ mode + Dexcom G7  Time Basal ICR ISF Active Insulin Time Target   MN 0.60 15 75 5h 110   6:30A 0.90 15 75 5h     Sleep schedule 10p-7a  TDD 29.01      Continuous Glucose Monitoring Interpretation  Tristin Gonzalez has undergone continuous glucose monitoring with their CGM.  The blood glucose tracings were evaluated for two weeks prior to office visit.   Blood glucose tracings demonstrated areas of hyperglycemia post-meal, particularly post-delayed mealtime bolus  There were occasional hypoglycemia, particularly after overrides on the pump .          History/Other:    Allergies, PMH, SocHx and FHx reviewed and updated as appropriate in Epic on   No outpatient medications have been marked as taking for the 24 encounter (Office Visit) with Beatriz Aguilar APN.     No Known Allergies  Current Outpatient Medications   Medication Sig Dispense Refill    HUMALOG 100 UNIT/ML Injection Solution INJECT SUBCUTANEOUSLY VIA  INSULIN PUMP , MAX 70 UNITS  DAILY 70 mL 3    DEXCOM G7 SENSOR Does not apply Misc CHANGE 1 SENSOR EVERY 10 DAYS 9 each 3    ibuprofen 600 MG Oral Tab Take 1 tablet (600 mg total) by mouth every 6 (six) hours as needed for Pain. 30 tablet 1    labetalol 200 MG Oral Tab Take 1 tablet (200 mg total) by mouth in the morning and 1 tablet (200 mg total) before bedtime. 28 tablet 1    insulin glargine  (LANTUS SOLOSTAR) 100 UNIT/ML Subcutaneous Solution Pen-injector Inject 17 Units into the skin nightly. To use in the event of pump failure.      prenatal vitamin with DHA 27-0.8-228 MG Oral Cap Take 1 capsule by mouth daily.      cholecalciferol (VITAMIN D3) 125 MCG (5000 UT) Oral Cap Take 1 capsule (5,000 Units total) by mouth daily.      Glucose Blood (CONTOUR NEXT TEST) In Vitro Strip Use to test BG up to four times daily. 400 strip 3    glucagon (GVOKE HYPOPEN 2-PACK) 1 MG/0.2ML Subcutaneous SUBQ injection Inject 0.2 mL (1 mg total) into the skin as needed. Patient trained on Gvoke. No substitutions 0.4 mL 1    Insulin Pen Needle (BD PEN NEEDLE CARMEN U/F) 32G X 4 MM Does not apply Misc Inject 1 Pen into the skin daily. Use as insulin pump back up      OneTouch Delica Lancets 33G Does not apply Misc 1 Lancet by Finger stick route As Directed.      Glucose Blood (ONETOUCH VERIO) In Vitro Strip 1 each by Other route 4 (four) times daily.      Microlet Lancets Does not apply Misc 1 each by Other route TID & HS.       Past Medical History:    Insulin pump in place    Type 1 diabetes mellitus (HCC)     Family History   Problem Relation Age of Onset    Other (multiple sclerosis) Mother     Other (Hemochromatosis) Father     Asthma Brother     Colon Cancer Maternal Grandmother     Other (CABG) Maternal Grandfather     Dementia Maternal Grandfather     Other (Coronary artery disease) Maternal Grandfather     Colon Cancer Paternal Grandmother 70    Blood Cancer Paternal Grandfather 70        Lymphoma?     Social history: Reviewed.      ROS/Exam    REVIEW OF SYSTEMS: Ten point review of systems has been performed and is otherwise negative and/or non-contributory, except as described above.     VITALS  Vitals:    08/20/24 0844   BP: 108/64   BP Location: Left arm   Patient Position: Sitting   Cuff Size: adult   Pulse: 110   SpO2: 98%   Weight: 136 lb 9.6 oz (62 kg)   Height: 5' 6\" (1.676 m)       Wt Readings from Last 6  Encounters:   08/20/24 136 lb 9.6 oz (62 kg)   06/20/24 167 lb (75.8 kg)   04/17/24 152 lb (68.9 kg)   03/20/24 148 lb (67.1 kg)   02/29/24 144 lb (65.3 kg)   02/21/24 144 lb (65.3 kg)       PHYSICAL EXAM  CONSTITUTIONAL:  awake, alert, cooperative, no apparent distress, and appears stated age   PSYCH: normal affect  LUNGS: breathing comfortably  CARDIOVASCULAR:  regular rate     Labs/Imaging: Pertinent imaging reviewed.    Overall glucose control:  Lab Results   Component Value Date    A1C 6.0 (A) 08/20/2024    A1C 5.6 04/25/2024    A1C 5.7 (H) 01/31/2024    A1C 6.2 (A) 09/15/2023    A1C 6.4 (A) 06/16/2023       Supplementary Documentation:   -Surveillance for Diabetes Complications & Risks  Foot exam/neuropathy: No data recorded  Retinopathy screening:  Due for repeat testing postpartum-- plans to go soon. Mild DR noted last time.  Last Dilated Eye Exam: 04/12/24  Eye Exam shows Diabetic Retinopathy?: Yes      Assessment & Plan:     ICD-10-CM    1. Type 1 diabetes mellitus without complication (HCC)  E10.9 POC Hgb A1C     Microalb/Creat Ratio, Random Urine [E]     GLUC MNTR CONT REC FROM NTRSTL TISS FLU PHYS I&R      2. Insulin pump titration  Z46.81       3. Insulin pump in place  Z96.41           Tristin Gonzalez is a pleasant 34 year old female here for evaluation of:    #Diabetes- PMHx of Type 1 diabetes mellitus diagnosed in 1996- 6 years old.  Mild DR noted on last eye exam. Her DM is otherwise well managed on tandem insulin pump. Some ongoing post meal hyper/hypoglycemia particularly when overriding pump instead of entering carbs. Talked through pre bolusing and not overriding system. Will also loosed afternoon ISF. Otherwise generally stable control.     Last A1c value was 6% done 8/20/2024. Goal <7%. Importance of better glucose control in preventing onset/progression of end-organ damage discussed, as well as goals of therapy and clinical significance of A1C.     - med changes:  -   Time Basal ICR ISF Active  Insulin Time Target   MN 0.60 15  75 5h 110   6:30A 0.90 15 75  5h     12P (make a new profile) 0.90 u/hr 15 70     Sleep schedule 10p-7a    - continue Dexcom G7 CGM  - patient is on insulin, and has suboptimal glycemic control including wide glycemic swings, thus would benefit from CGM  - pump backup plan: lantus 15u nightly, humalog ICR 1:15, ISF 1:70 > 140  - has gvoke/glucagon at home   -See above header \"Supplementary Documentation\" for surveillance for diabetes complications & risks    #Nephropathy screening: Stable  Lab Results   Component Value Date    EGFRCR 112 06/20/2024    MICROALBCREA 16.6 05/23/2023      #Blood pressure control: SBP is to goal <130   BP Readings from Last 1 Encounters:   08/20/24 108/64   BP Meds: labetalol Tabs - 200 MG     #Hyperlipidemia/Lipids: LDL is to goal   Lab Results   Component Value Date    LDL 74 05/23/2023    TRIG 43 05/23/2023   Cholesterol Meds:  none         Return in about 4 months (around 12/20/2024) for follow up on pump settings.    8/20/2024  YEMI Gilbert    Note to patient: The 21 Century Cures Act makes medical notes like these available to patients in the interest of transparency. However, be advised this is a medical document. It is intended as peer to peer communication. It is written in medical language and may contain abbreviations or verbiage that are unfamiliar. It may appear blunt or direct. Medical documents are intended to carry relevant information, facts as evident, and the clinical opinion of the practitioner.

## 2024-08-20 NOTE — PATIENT INSTRUCTIONS
Follow up plan:  With YEMI Gilbert: Return in about 3 months (around 11/20/2024) for follow up on pump settings.    Last A1c value was 6% done 8/20/2024.    Time Basal ICR ISF Active Insulin Time Target   MN 0.60 15  75 5h 110   6:30A 0.90 15 75  5h     12P (make a new profile) 0.90 u/hr 15 70     Sleep schedule 10p-7a  TDD 29.01    Updated/current diabetes medication instructions:  Diabetic Medications               HUMALOG 100 UNIT/ML Injection Solution INJECT SUBCUTANEOUSLY VIA  INSULIN PUMP , MAX 70 UNITS  DAILY    insulin glargine (LANTUS SOLOSTAR) 100 UNIT/ML Subcutaneous Solution Pen-injector Inject 17 Units into the skin nightly. To use in the event of pump failure.    glucagon (GVOKE HYPOPEN 2-PACK) 1 MG/0.2ML Subcutaneous SUBQ injection Inject 0.2 mL (1 mg total) into the skin as needed. Patient trained on Gvoke. No substitutions            Office phone number: [phone]; phones are open Monday-Friday 8:30-4:30.   Thank you for visiting our office. We look forward to working with you to reach your health goals. As a reminder, if you need refills, please request early so there is enough time to process the request. We ask that you provide at least 5 days' notice before a refill is due, so there is time to send a request to pharmacy, process the refill, and ensure there are no other problems with obtaining the medication (backorders, prior authorization paperwork, etc). Routine refills will not be addressed on weekends, so please submit these requests during the week.    Blood sugar targets:  Before breakfast: , 2 hours after meals: <180 (preferably <150), A1C goal: <7.0%  Time in Range goal is higher than 80% if using a continuous glucose monitor (Dexcom or Delgado).  Time in Range can be found within the Dexcom G7 coco, on Clarity if using Dexcom G6, or on LibreView if using Delgado.    HOW TO TREAT LOW BLOOD SUGAR (Hypoglycemia)  Low blood sugar= Less than 70, or if you start to have symptoms  (below)  Symptoms: Shaking or trembling, fast heart rate, extreme hunger, sweating, confusion/difficulty concentrating, dizziness.    How to treat a low blood sugar if you are able to eat/drink: The Rule of 15  If you are using continuous glucose monitor that says you are low, but you do not have any symptoms, verify on fingerstick that your blood sugar is actually low before treating.   Eat 15 grams of carbs (see examples below)  Check your blood sugar after 15 minutes. If it’s still below your target range, have another serving.   Repeat these steps until it’s in your target range. Once it’s in range, if you're nervous about your sugar going low again, have a protein source (ie, a spoonful of peanut butter).   If you have a CGM you want to look for how your arrow has changed. If you arrow is pointed up or sideways after 15 min, give your CGM more time OR check with a finger stick. Try not to eat more food until at least 15 min after the first BG check - otherwise you risk having a rebound high.  If you are experiencing symptoms and you are unable to check your blood glucose for any reason, treat the hypoglycemia.  If someone has a low blood sugar and is unconscious: Don’t hesitate to call 911. If someone is unconscious and glucagon is not available or someone does not know how to use it, call 911 immediately.    To treat a low, I recommend you carry with you easy, pre-portioned treatment for low blood sugars that are 15G of carbs:   - Children sized squeeze pouch applesauce (high fiber + carbs help prevent too high of a spike)  - Small children's sized juicebox- 15g carb --> 4oz juice box  - Glucose tablets from [employer], you can find them near diabetes supplies --> Note, you will need to eat 3-4 tablets to get to 15g of carbs  - Children sized fruit snack pack- look for one with 15 grams of total carbohydrate  - Choice of how to treat your low is important. Complex carbs, or foods that contain fats along  with carbs (like chocolate) can slow the absorption of glucose and should NOT be used to treat an emergency low

## 2024-11-18 ENCOUNTER — PATIENT MESSAGE (OUTPATIENT)
Facility: CLINIC | Age: 34
End: 2024-11-18

## 2024-12-16 NOTE — PROGRESS NOTES
[facility]    Name: Tristin Gonzalez    Date: 12/17/2024    Tristin Gonzalez is a 34 year old female who presents for evaluation of T1DM management.     Chief complaint: Follow - Up (Pt presents for DM follow up. Repeat A1C completed in clinic.  )       Subjective:    Initial HPI consult in March 2023:   A1C 6.4%  Diagnosed age: 7y/o  Pt denies hx of hospitalization for DM and/or pancreatitis.  Family history of DM: None  Only has had DKA 1x possibly- unsure  Establishing care to be closer to home, prev pt of Dr Perrin in [place]  Using Tandem pump in [employer], is due for pump upgrade but needed updated visit note     Interim hx  12/16/2024-w/ Jacinta BRAGG.Last A1c value was 6.3% done 12/17/2024.  Notes some ongoing roller coaster with her blood sugars, thinks she needs some dose adjustments.  Continues to override the pump at times because it is not getting her enough insulin to correct her down.  Treating lows with 9g of carbs (juice, fruit snacks)   LMP 11/27/24, notes during the time of her menses, her blood sugars tend to drop.  She does have a separate menstrual cycle profile. Periods are heavier. Also noting hair loss. Wondering if something is wrong with her iron or thyroid.     DM meds at visit:   Tandem Tslim + Dexcom G7 + Humalog  Time Basal ICR ISF Active Insulin Time Target   MN 0.55 12  68 5h 110   6:30A 0.70 12 68  5h     Sleep schedule 10p-7a  TDD 35u/day    Continuous Glucose Monitoring Interpretation  Tristin Gonzalez has undergone continuous glucose monitoring with their CGM.  The blood glucose tracings were evaluated for two weeks prior to office visit.   Blood glucose tracings demonstrated areas of hyperglycemia occasionally after carb entries, could be from under carb counting.  There were occasional hypoglycemia, particularly after stacked boluses .          History/Other:    Allergies, PMH, SocHx and FHx reviewed and updated as appropriate in Epic on   No outpatient medications have been  marked as taking for the 12/17/24 encounter (Office Visit) with Beatriz Coleman APN.     No Known Allergies  Current Outpatient Medications   Medication Sig Dispense Refill    HUMALOG 100 UNIT/ML Injection Solution INJECT SUBCUTANEOUSLY VIA  INSULIN PUMP , MAX 70 UNITS  DAILY 70 mL 3    DEXCOM G7 SENSOR Does not apply Misc CHANGE 1 SENSOR EVERY 10 DAYS 9 each 3    ibuprofen 600 MG Oral Tab Take 1 tablet (600 mg total) by mouth every 6 (six) hours as needed for Pain. 30 tablet 1    labetalol 200 MG Oral Tab Take 1 tablet (200 mg total) by mouth in the morning and 1 tablet (200 mg total) before bedtime. 28 tablet 1    insulin glargine (LANTUS SOLOSTAR) 100 UNIT/ML Subcutaneous Solution Pen-injector Inject 17 Units into the skin nightly. To use in the event of pump failure.      prenatal vitamin with DHA 27-0.8-228 MG Oral Cap Take 1 capsule by mouth daily.      cholecalciferol (VITAMIN D3) 125 MCG (5000 UT) Oral Cap Take 1 capsule (5,000 Units total) by mouth daily.      Glucose Blood (CONTOUR NEXT TEST) In Vitro Strip Use to test BG up to four times daily. 400 strip 3    glucagon (GVOKE HYPOPEN 2-PACK) 1 MG/0.2ML Subcutaneous SUBQ injection Inject 0.2 mL (1 mg total) into the skin as needed. Patient trained on Gvoke. No substitutions 0.4 mL 1    Insulin Pen Needle (BD PEN NEEDLE CARMEN U/F) 32G X 4 MM Does not apply Misc Inject 1 Pen into the skin daily. Use as insulin pump back up      OneTouch Delica Lancets 33G Does not apply Misc 1 Lancet by Finger stick route As Directed.      Glucose Blood (ONETOUCH VERIO) In Vitro Strip 1 each by Other route 4 (four) times daily.      Microlet Lancets Does not apply Misc 1 each by Other route TID & HS.       Past Medical History:    Insulin pump in place    Type 1 diabetes mellitus (HCC)     Family History   Problem Relation Age of Onset    Other (multiple sclerosis) Mother     Other (Hemochromatosis) Father     Asthma Brother     Colon Cancer Maternal Grandmother     Other  (CABG) Maternal Grandfather     Dementia Maternal Grandfather     Other (Coronary artery disease) Maternal Grandfather     Colon Cancer Paternal Grandmother 70    Blood Cancer Paternal Grandfather 70        Lymphoma?     Social history: Reviewed.      ROS/Exam    REVIEW OF SYSTEMS: Ten point review of systems has been performed and is otherwise negative and/or non-contributory, except as described above.     VITALS  Vitals:    12/17/24 0844   BP: 116/78   BP Location: Left arm   Patient Position: Sitting   Cuff Size: adult   Pulse: 101   SpO2: 97%   Height: 5' 6\" (1.676 m)         Wt Readings from Last 6 Encounters:   08/20/24 136 lb 9.6 oz (62 kg)   06/20/24 167 lb (75.8 kg)   04/17/24 152 lb (68.9 kg)   03/20/24 148 lb (67.1 kg)   02/29/24 144 lb (65.3 kg)   02/21/24 144 lb (65.3 kg)       PHYSICAL EXAM  CONSTITUTIONAL:  awake, alert, cooperative, no apparent distress, and appears stated age   PSYCH: normal affect  LUNGS: breathing comfortably  CARDIOVASCULAR:  regular rate     Labs/Imaging: Pertinent imaging reviewed.    Overall glucose control:  Lab Results   Component Value Date    A1C 6.3 (A) 12/17/2024    A1C 6.0 (A) 08/20/2024    A1C 5.6 04/25/2024    A1C 5.7 (H) 01/31/2024    A1C 6.2 (A) 09/15/2023       Supplementary Documentation:   -Surveillance for Diabetes Complications & Risks  Foot exam/neuropathy: No data recorded  Retinopathy screening:  Due for repeat testing postpartum-- plans to go soon. Mild DR noted last time.  Last Dilated Eye Exam: 04/12/24  Eye Exam shows Diabetic Retinopathy?: Yes      Assessment & Plan:     ICD-10-CM    1. 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 Insulin pump titration  Z46.81       3. Hair loss  L65.9 TSH and Free T4 [E]     Vitamin D [E]     Iron And Tibc     Ferritin      4. Chronic fatigue  R53.82 TSH  and Free T4 [E]     Vitamin D [E]     CBC, Platelet, No Differential [E]     Iron And Tibc     Ferritin            Tristin Gonzalez is a pleasant 34 year old female here for evaluation of:    #Type 1 Diabetes- PMHx of Type 1 diabetes mellitus diagnosed in 1996- 6 years old.  Mild DR noted on last eye exam. Her DM is otherwise well managed on tandem insulin pump. Some ongoing post meal hyper/hypoglycemia particularly when overriding pump instead of entering carbs. Talked through pre bolusing and not overriding system. Will also loosed ISF across the board. Plan to check in with CDE in 2 weeks on changes.     Last A1c value was 6.3% done 12/17/2024. Goal <7%. Importance of better glucose control in preventing onset/progression of end-organ damage discussed, as well as goals of therapy and clinical significance of A1C.     - med changes:  Tandem Tslim + Dexcom G7 + Humalog  Time Basal ICR ISF Active Insulin Time Target   MN 0.55 12  68--> 58 5h 110   6:30A 0.70 12 68 --> 58 5h       Behaviors discussed:  - prebolus 10-15 mins before when you can  - ISF goal is 52,  can slowly lower it down in the coming days  - Anytime you want to override, don't, but keep a note in your phone so we can count how often that's still happening and identify a pattern  - we may want to intensify your carb ratio - but first will try this change     - continue Dexcom G7 CGM  - patient is on insulin, and has suboptimal glycemic control including wide glycemic swings, thus would benefit from CGM  - pump backup plan: lantus 15u nightly, humalog ICR 1:15, ISF 1:70 > 140  - has gvoke/glucagon at home   -See above header \"Supplementary Documentation\" for surveillance for diabetes complications & risks    Nephropathy screening: Stable  Lab Results   Component Value Date    EGFRCR 112 06/20/2024    MICROALBCREA 16.6 05/23/2023      Blood pressure control: SBP is to goal <130   BP Readings from Last 1 Encounters:   12/17/24 116/78   BP Meds: labetalol  Tabs - 200 MG     Lipids: LDL is to goal   Lab Results   Component Value Date    LDL 74 05/23/2023    TRIG 43 05/23/2023   Cholesterol Meds:  none     #Chronic fatigue  #Hair loss  Reviewed that fatigue is often multifactorial and we have reviewed the common etiologies, both endocrine and non-endocrine.  - repeat TFTs today  - prev hx of LISA, will add on CBC / iron studies today, advised needs to follow up with PCP if abnormal  - vit D- repeat, will order replacement if deficient  - defer vit B12 for now  - reviewed if all above normal, pt will continue to follow up with PCP          Return in about 4 months (around 4/17/2025) for diabetes follow up.    12/17/2024  YEMI Gilbert    Note to patient: The 21 Century Cures Act makes medical notes like these available to patients in the interest of transparency. However, be advised this is a medical document. It is intended as peer to peer communication. It is written in medical language and may contain abbreviations or verbiage that are unfamiliar. It may appear blunt or direct. Medical documents are intended to carry relevant information, facts as evident, and the clinical opinion of the practitioner.

## 2024-12-17 ENCOUNTER — OFFICE VISIT (OUTPATIENT)
Facility: CLINIC | Age: 34
End: 2024-12-17
Payer: COMMERCIAL

## 2024-12-17 VITALS
HEART RATE: 101 BPM | DIASTOLIC BLOOD PRESSURE: 78 MMHG | HEIGHT: 66 IN | OXYGEN SATURATION: 97 % | BODY MASS INDEX: 22 KG/M2 | SYSTOLIC BLOOD PRESSURE: 116 MMHG

## 2024-12-17 DIAGNOSIS — L65.9 HAIR LOSS: ICD-10-CM

## 2024-12-17 DIAGNOSIS — E10.3293 TYPE 1 DIABETES MELLITUS WITH MILD NONPROLIFERATIVE RETINOPATHY OF BOTH EYES, MACULAR EDEMA PRESENCE UNSPECIFIED (HCC): Primary | ICD-10-CM

## 2024-12-17 DIAGNOSIS — R53.82 CHRONIC FATIGUE: ICD-10-CM

## 2024-12-17 DIAGNOSIS — Z46.81 INSULIN PUMP TITRATION: ICD-10-CM

## 2024-12-17 LAB — HEMOGLOBIN A1C: 6.3 % (ref 4.3–5.6)

## 2024-12-17 PROCEDURE — 3044F HG A1C LEVEL LT 7.0%: CPT

## 2024-12-17 PROCEDURE — 83036 HEMOGLOBIN GLYCOSYLATED A1C: CPT

## 2024-12-17 PROCEDURE — 99214 OFFICE O/P EST MOD 30 MIN: CPT

## 2024-12-17 PROCEDURE — 3074F SYST BP LT 130 MM HG: CPT

## 2024-12-17 PROCEDURE — 95251 CONT GLUC MNTR ANALYSIS I&R: CPT

## 2024-12-17 PROCEDURE — 3078F DIAST BP <80 MM HG: CPT

## 2024-12-17 NOTE — PATIENT INSTRUCTIONS
Return Visit:  With YEMI Hull: Return in about 4 months (around 4/17/2025) for diabetes follow up.      []  Diabetes Education with Rohini RN: in 2 week(s)    Medication/devices:  [] GLP1a medication start check in - Nova will evaluate how you are feeling on the GLP1a and send information to me, I will then titrate your dose as needed   [] Pump settings check in after changes made today   [] Starting insulin pump ( 90 mins)   [] Pump 101 ( learning about pumps and carb counting)   [x] BG check in   [] Ordered CGM today- Patient will need to call the office to schedule CGM training once they receive their device. Call Office phone number: [phone] to schedule    Tandem Tslim + Dexcom G7 + Humalog  Time Basal ICR ISF Active Insulin Time Target   MN 0.55 12  68--> 58 5h 110   6:30A 0.70 12 68 --> 58 5h       - prebolus 10-15 mins before when you can  - ISF goal is 52, bring yourself slowly down in the coming days  - Anytime you want to override, don't, but keep a note in your phone so we can count how often that's still happening and identify a pattern  - we may want to intensify your carb ratio - but first will try this change     Updated/current diabetes medication instructions:  Diabetic Medications               HUMALOG 100 UNIT/ML Injection Solution INJECT SUBCUTANEOUSLY VIA  INSULIN PUMP , MAX 70 UNITS  DAILY    insulin glargine (LANTUS SOLOSTAR) 100 UNIT/ML Subcutaneous Solution Pen-injector Inject 17 Units into the skin nightly. To use in the event of pump failure.    glucagon (GVOKE HYPOPEN 2-PACK) 1 MG/0.2ML Subcutaneous SUBQ injection Inject 0.2 mL (1 mg total) into the skin as needed. Patient trained on Gvoke. No substitutions              HOW TO TREAT LOW BLOOD SUGAR (Hypoglycemia)  Low blood sugar= Less than 70, or if you start to have symptoms (below)  Symptoms: Shaking or trembling, fast heart rate, extreme hunger, sweating, confusion/difficulty concentrating, dizziness.    How to treat a  low blood sugar if you are able to eat/drink: The Rule of 15/15  If you are using continuous glucose monitor that says you are low, but you do not have any symptoms, verify on fingerstick that your blood sugar is actually low before treating.   Eat 15 grams of carbs (see examples below)  Check your blood sugar after 15 minutes. If it’s still below your target range, have another serving.   Repeat these steps until it’s in your target range. Once it’s in range, if you're nervous about your sugar going low again, have a protein source (ie, a spoonful of peanut butter).   If you have a CGM you want to look for how your arrow has changed. If you arrow is pointed up or sideways after 15 min, give your CGM more time OR check with a finger stick. Try not to eat more food until at least 15 min after the first BG check - otherwise you risk having a rebound high.  If you are experiencing symptoms and you are unable to check your blood glucose for any reason, treat the hypoglycemia.  If someone has a low blood sugar and is unconscious: Don’t hesitate to call 911. If someone is unconscious and glucagon is not available or someone does not know how to use it, call 911 immediately.     To treat a low, I recommend you carry with you easy, pre-portioned treatment for low blood sugars that are 15G of carbs:   - Children sized squeeze pouch applesauce (high fiber + carbs help prevent too high of a spike)  - Small children's sized juicebox- 15g carb --> 4oz juice box  - Glucose tablets from [employer], you can find them near diabetes supplies --> Note, you will need to eat 3-4 tablets to get to 15g of carbs  - Children sized fruit snack pack- look for one with 15 grams of total carbohydrate  - Choice of how to treat your low is important. Complex carbs, or foods that contain fats along with carbs (like chocolate) can slow the absorption of glucose and should NOT be used to treat an emergency low     Office phone number:  [phone]; phones are open Monday-Friday 8:30-4:30.   Thank you for visiting our office. We look forward to working with you to reach your health goals. As a reminder, if you need refills, please request early so there is enough time to process the request. We ask that you provide at least 5 days' notice before a refill is due, so there is time to send a request to pharmacy, process the refill, and ensure there are no other problems with obtaining the medication (backorders, prior authorization paperwork, etc). Routine refills will not be addressed on weekends, so please submit these requests during the week.  - If labs were ordered today, please complete prior to your follow up visit   - Please call our office if sugars at home are consistently greater than 250 or less than 70

## 2024-12-30 ENCOUNTER — LAB ENCOUNTER (OUTPATIENT)
Dept: LAB | Age: 34
End: 2024-12-30
Payer: COMMERCIAL

## 2024-12-30 DIAGNOSIS — R53.82 CHRONIC FATIGUE: ICD-10-CM

## 2024-12-30 DIAGNOSIS — L65.9 HAIR LOSS: ICD-10-CM

## 2024-12-30 DIAGNOSIS — E10.9 TYPE 1 DIABETES MELLITUS WITHOUT COMPLICATION (HCC): ICD-10-CM

## 2024-12-30 LAB
CREAT UR-SCNC: 117 MG/DL
DEPRECATED HBV CORE AB SER IA-ACNC: 8 NG/ML
ERYTHROCYTE [DISTWIDTH] IN BLOOD BY AUTOMATED COUNT: 12.2 %
HCT VFR BLD AUTO: 38 %
HGB BLD-MCNC: 13 G/DL
IRON SATN MFR SERPL: 14 %
IRON SERPL-MCNC: 40 UG/DL
MCH RBC QN AUTO: 28.8 PG (ref 26–34)
MCHC RBC AUTO-ENTMCNC: 34.2 G/DL (ref 31–37)
MCV RBC AUTO: 84.1 FL
MICROALBUMIN UR-MCNC: 1.2 MG/DL
MICROALBUMIN/CREAT 24H UR-RTO: 10.3 UG/MG (ref ?–30)
PLATELET # BLD AUTO: 273 10(3)UL (ref 150–450)
RBC # BLD AUTO: 4.52 X10(6)UL
T4 FREE SERPL-MCNC: 1.3 NG/DL (ref 0.8–1.7)
TOTAL IRON BINDING CAPACITY: 285 UG/DL (ref 250–425)
TRANSFERRIN SERPL-MCNC: 210 MG/DL (ref 250–380)
TSI SER-ACNC: 1.85 UIU/ML (ref 0.55–4.78)
VIT D+METAB SERPL-MCNC: 25.3 NG/ML (ref 30–100)
WBC # BLD AUTO: 5.1 X10(3) UL (ref 4–11)

## 2024-12-30 PROCEDURE — 82043 UR ALBUMIN QUANTITATIVE: CPT

## 2024-12-30 PROCEDURE — 82570 ASSAY OF URINE CREATININE: CPT

## 2024-12-30 PROCEDURE — 83540 ASSAY OF IRON: CPT

## 2024-12-30 PROCEDURE — 82306 VITAMIN D 25 HYDROXY: CPT

## 2024-12-30 PROCEDURE — 84443 ASSAY THYROID STIM HORMONE: CPT

## 2024-12-30 PROCEDURE — 82728 ASSAY OF FERRITIN: CPT

## 2024-12-30 PROCEDURE — 85027 COMPLETE CBC AUTOMATED: CPT

## 2024-12-30 PROCEDURE — 84439 ASSAY OF FREE THYROXINE: CPT

## 2024-12-30 PROCEDURE — 83550 IRON BINDING TEST: CPT

## 2025-02-12 ENCOUNTER — PATIENT MESSAGE (OUTPATIENT)
Facility: CLINIC | Age: 35
End: 2025-02-12

## 2025-02-12 DIAGNOSIS — O24.012: ICD-10-CM

## 2025-02-12 NOTE — TELEPHONE ENCOUNTER
Mychart response sent to patient for review.     Will await response regarding when patient can come in for sample.

## 2025-02-14 RX ORDER — ACYCLOVIR 400 MG/1
TABLET ORAL
Qty: 9 EACH | Refills: 1 | Status: SHIPPED | OUTPATIENT
Start: 2025-02-14

## 2025-02-14 NOTE — TELEPHONE ENCOUNTER
Endocrine Refill protocol for CGM supplies     Protocol Criteria:  PASSED Reason: N/A    If below requirement is met, send a 90-day supply with 1 refill per provider protocol.     Verify appointment with Endocrinology completed in the last 12 months or scheduled in the next 6 months     Last completed office visit:12/17/2024 Beatriz Coleman APN   Next scheduled Follow up: No future appointments.     Sent per protocol.     Mychart sent to patient for review. Sample place aside for her.    Closing encounter.

## 2025-04-23 LAB
AMB EXT CHOLESTEROL, TOTAL: 187 MG/DL
AMB EXT EGFR NON-AA: 90
AMB EXT HDL CHOLESTEROL: 66 MG/DL
AMB EXT LDL CHOLESTEROL, DIRECT: 104 MG/DL
AMB EXT TRIGLYCERIDES: 83 MG/DL

## 2025-04-29 ENCOUNTER — TELEMEDICINE (OUTPATIENT)
Facility: CLINIC | Age: 35
End: 2025-04-29
Payer: COMMERCIAL

## 2025-04-29 DIAGNOSIS — Z46.81 INSULIN PUMP TITRATION: ICD-10-CM

## 2025-04-29 DIAGNOSIS — E10.3293 TYPE 1 DIABETES MELLITUS WITH MILD NONPROLIFERATIVE RETINOPATHY OF BOTH EYES, MACULAR EDEMA PRESENCE UNSPECIFIED (HCC): Primary | ICD-10-CM

## 2025-04-29 PROCEDURE — 95251 CONT GLUC MNTR ANALYSIS I&R: CPT

## 2025-04-29 PROCEDURE — 98007 SYNCH AUDIO-VIDEO EST HI 40: CPT

## 2025-04-29 RX ORDER — GLUCAGON INJECTION, SOLUTION 1 MG/.2ML
1 INJECTION, SOLUTION SUBCUTANEOUS AS NEEDED
Qty: 0.4 ML | Refills: 1 | Status: SHIPPED | OUTPATIENT
Start: 2025-04-29

## 2025-04-29 NOTE — PROGRESS NOTES
[facility] Note - Telemedicine    Tristin Gonzalez verbally consents to a Telemedicine (Audio + Video) visit on 4/29/2025. The visit was conducted in a private room.     Patient understands and accepts financial responsibility for any deductible, co-insurance and/or co-pays associated with this service.         Subjective:    Initial HPI consult in March 2023:   A1C 6.4%  Diagnosed age: 5y/o  Pt denies hx of hospitalization for DM and/or pancreatitis.  Family history of DM: None  Only has had DKA 1x possibly- unsure  Establishing care to be closer to home, prev pt of Dr Perrin in [place]  Using Tandem pump in [employer], is due for pump upgrade but needed updated visit note     Interim hx  4/29/2025-w/ Jacinta BRAGG. Last A1c value was 6.3% done 12/17/2024.  Last office visit, her ISF was reduced. She self adjusted her basals in the afternoon due to hyperglycemia   Notes with menses- having low blood sugars, had to make a new profile for the first few days (LMP 4/22-4/27)  Menses are heavier as well w/ chin hair, completed labs with OBGYN, testosterone was normal   Notes when she goes into work/the office- she is dropping too low, but doesn't notice as much at home (?less walking)   Going to [place] in [place]! Needs a travel letter     DM meds at visit: Tandem Tslim + Dexcom G7 + Humalog  Time Basal ICR ISF Active Insulin Time Target   MN 0.75 12  58 5h 110   6:30A 0.90 12  5h     12P 1.2       7P 0.90        TDD 38u/day     Continuous Glucose Monitoring Interpretation  Tristin Gonzalez has undergone continuous glucose monitoring with their CGM.  The blood glucose tracings were evaluated for two weeks prior to office visit.   Blood glucose tracings demonstrated areas of hyperglycemia occ post meal - better trends in the afternoon  There were occasional hypoglycemia, particularly after stacked control iq/override doses .      MENSES: switched to new profile mid day after drop around 4pm       Changed back to other  profile:    History/Other:    Allergies, PMH, SocHx and FHx reviewed and updated as appropriate in Epic on    glucagon (GVOKE HYPOPEN 2-PACK) 1 MG/0.2ML Subcutaneous injection Inject 0.2 mL (1 mg total) into the skin as needed. Patient trained on Gvoke. No substitutions 0.4 mL 1     No Known Allergies  Current Outpatient Medications   Medication Sig Dispense Refill    glucagon (GVOKE HYPOPEN 2-PACK) 1 MG/0.2ML Subcutaneous injection Inject 0.2 mL (1 mg total) into the skin as needed. Patient trained on Gvoke. No substitutions 0.4 mL 1    Continuous Glucose Sensor (DEXCOM G7 SENSOR) Does not apply Misc Use 1 sensor every 10 days. 90 day supply. 9 each 1    HUMALOG 100 UNIT/ML Injection Solution INJECT SUBCUTANEOUSLY VIA  INSULIN PUMP , MAX 70 UNITS  DAILY 70 mL 3    ibuprofen 600 MG Oral Tab Take 1 tablet (600 mg total) by mouth every 6 (six) hours as needed for Pain. 30 tablet 1    labetalol 200 MG Oral Tab Take 1 tablet (200 mg total) by mouth in the morning and 1 tablet (200 mg total) before bedtime. 28 tablet 1    insulin glargine (LANTUS SOLOSTAR) 100 UNIT/ML Subcutaneous Solution Pen-injector Inject 17 Units into the skin nightly. To use in the event of pump failure.      prenatal vitamin with DHA 27-0.8-228 MG Oral Cap Take 1 capsule by mouth daily.      cholecalciferol (VITAMIN D3) 125 MCG (5000 UT) Oral Cap Take 1 capsule (5,000 Units total) by mouth daily.      Glucose Blood (CONTOUR NEXT TEST) In Vitro Strip Use to test BG up to four times daily. 400 strip 3    Insulin Pen Needle (BD PEN NEEDLE CARMEN U/F) 32G X 4 MM Does not apply Misc Inject 1 Pen into the skin daily. Use as insulin pump back up      OneTouch Delica Lancets 33G Does not apply Misc 1 Lancet by Finger stick route As Directed.      Glucose Blood (ONETOUCH VERIO) In Vitro Strip 1 each by Other route 4 (four) times daily.      Microlet Lancets Does not apply Misc 1 each by Other route TID & HS.       Past Medical History:    Insulin pump in  place    Type 1 diabetes mellitus (HCC)     Family History   Problem Relation Age of Onset    Other (multiple sclerosis) Mother     Other (Hemochromatosis) Father     Asthma Brother     Colon Cancer Maternal Grandmother     Other (CABG) Maternal Grandfather     Dementia Maternal Grandfather     Other (Coronary artery disease) Maternal Grandfather     Colon Cancer Paternal Grandmother 70    Blood Cancer Paternal Grandfather 70        Lymphoma?     Social history: Reviewed.      ROS/Exam    REVIEW OF SYSTEMS: Ten point review of systems has been performed and is otherwise negative and/or non-contributory, except as described above.     VITALS  There were no vitals filed for this visit.        Wt Readings from Last 6 Encounters:   08/20/24 136 lb 9.6 oz (62 kg)   06/20/24 167 lb (75.8 kg)   04/17/24 152 lb (68.9 kg)   03/20/24 148 lb (67.1 kg)   02/29/24 144 lb (65.3 kg)   02/21/24 144 lb (65.3 kg)       PHYSICAL EXAM  Limited due to telemedicine encounter    Constitutional Not in acute distress  Pulmonary: no wheezing heard  No coughing on the phone. Speaking in full sentences   Neurological:  Alert and oriented to person, place and time.   Psychiatric: Normal affect, mood and behavior appropriate      Labs/Imaging: Pertinent imaging reviewed.    Overall glucose control:  Lab Results   Component Value Date    A1C 6.3 (A) 12/17/2024    A1C 6.0 (A) 08/20/2024    A1C 5.6 04/25/2024    A1C 5.7 (H) 01/31/2024    A1C 6.2 (A) 09/15/2023       Supplementary Documentation:   -Surveillance for Diabetes Complications & Risks  Foot exam/neuropathy: No data recorded  Retinopathy screening:  Due for repeat testing postpartum-- plans to go soon. Mild DR noted last time.  No data recorded  No data recorded      Assessment & Plan:     ICD-10-CM    1. 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  Insulin pump titration  Z46.81         Tristin Gonzalez is a pleasant 34 year old female here for evaluation of:    #Type 1 Diabetes- PMHx of Type 1 diabetes mellitus diagnosed in 1996- 6 years old.  Mild DR noted on last eye exam. Her DM is otherwise well managed on tandem insulin pump.     Occasional post meal hyperglycemia despite carb entry. Will incr ICR slightly (very sensitive to small changes). If still high- advised to incr her ISF by 2 points. Discussed using activity mode during times of walking / at work.     Last A1c value was 6.3% done 12/17/2024. Goal <7%. Importance of better glucose control in preventing onset/progression of end-organ damage discussed, as well as goals of therapy and clinical significance of A1C.     - med changes:  Tandem Tslim + Dexcom G7 + Humalog  Time Basal ICR ISF Active Insulin Time Target   MN 0.75 12  --> 11 58 5h 110   6:30A 0.90 12 --> 11  5h     12P 1.2       7P 0.90          --> if still needing to override or feel high after meals- change sensitivity to 56  --> Use activity mode during meetings   - travel letter sent  - continue Dexcom G7 CGM  - patient is on insulin, and has suboptimal glycemic control including wide glycemic swings, thus would benefit from CGM  - pump backup plan: lantus 17u nightly, humalog ICR 1:15, ISF 1:70 > 140  - has gvoke/glucagon at home, resent 4/29/2025   -See above header \"Supplementary Documentation\" for surveillance for diabetes complications & risks    Nephropathy screening: Stable, continue annual monitoring  - 4/2025- GFR >90 with OBGYN labs, ext result console entered  Lab Results   Component Value Date    EGFRCR 112 06/20/2024    MICROALBCREA 10.3 12/30/2024      Blood pressure control: SBP is to goal <130   BP Readings from Last 1 Encounters:   12/17/24 116/78   BP Meds: labetalol Tabs - 200 MG     Lipids: LDL mildly elevated (104), avoid statin for now given childbearing age, done 4/2025- entered to ext result console  Lab Results    Component Value Date    LDL 74 05/23/2023    TRIG 83 04/23/2025   Cholesterol Meds:  none     Return in about 3 months (around 7/29/2025) for diabetes follow up.    A total of 40 minutes was spent today on obtaining history, reviewing pump report, reviewing pertinent labs from external provider, evaluating patient, providing multiple treatment options, reinforcing diet/exercise and compliance, and completing documentation.       4/29/2025  YEMI Hull     Note to patient: The 21 Century Cures Act makes medical notes like these available to patients in the interest of transparency. However, be advised this is a medical document. It is intended as peer to peer communication. It is written in medical language and may contain abbreviations or verbiage that are unfamiliar. It may appear blunt or direct. Medical documents are intended to carry relevant information, facts as evident, and the clinical opinion of the practitioner.

## 2025-04-29 NOTE — PATIENT INSTRUCTIONS
Return Visit:  With YEMI Hull: Return in about 3 months (around 7/29/2025) for diabetes follow up.      - complete your diabetes eye exam. This exam is critical to preventing and treating eye conditions caused by diabetes that could potentially lead to vision loss. Once complete, please call your eye care provider and ask them to fax your latest report to our office. This will help us update our records. Our fax number is: [phone]     Tandem Tslim + Dexcom G7 + Humalog  Time Basal ICR ISF Active Insulin Time Target   MN 0.75 12  --> 11 58 5h 110   6:30A 0.90 12 --> 11  5h     12P 1.2       7P 0.90          --> if still needing to override or feel high after meals- change sensitivity to 56  --> Use activity mode during meetings     Updated diabetes medication instructions from today:   Diabetic Medications              glucagon (GVOKE HYPOPEN 2-PACK) 1 MG/0.2ML Subcutaneous injection (Taking As Needed) Inject 0.2 mL (1 mg total) into the skin as needed. Patient trained on Gvoke. No substitutions    HUMALOG 100 UNIT/ML Injection Solution INJECT SUBCUTANEOUSLY VIA  INSULIN PUMP , MAX 70 UNITS  DAILY    insulin glargine (LANTUS SOLOSTAR) 100 UNIT/ML Subcutaneous Solution Pen-injector Inject 17 Units into the skin nightly. To use in the event of pump failure.            Lab Results   Component Value Date    A1C 6.3 (A) 12/17/2024    A1C 6.0 (A) 08/20/2024    A1C 5.6 04/25/2024    A1C 5.7 (H) 01/31/2024    A1C 6.2 (A) 09/15/2023        General follow up information:  Please let us know if you require any refills at least 1 week prior to your medication running out. If you do run out of medication, please call our office ASAP to request refills (do not wait until your follow up).   Please call our office if sugars at home are consistently greater than 250 or less than 70 for medication adjustment (do not wait until your follow up appointment).  Lab results and imaging will typically be reviewed at follow up  appointments, or within 3-5 business days of ALL results being in if you do not have an appointment scheduled in the near future. Our office will contact you for any abnormal results requiring more urgent follow up or action.   The on-call pager is for urgent matters only. If you are a type 1 diabetic and run out of insulin after business hours 8AM-4PM, you may call the on-call pager for a refill to a 24 hour pharmacy.  If you have adrenal insufficiency and run out of steroids, you may call the on-call pager for a refill to a 24 hour pharmacy. All other refill requests should be requested during business hours.    Office phone number: [phone]; phones are open Monday-Friday 8:30-4:30.       HOW TO TREAT LOW BLOOD SUGAR (Hypoglycemia)  Low blood sugar= Less than 70, or if you start to have symptoms (below)  Symptoms: Shaking or trembling, fast heart rate, extreme hunger, sweating, confusion/difficulty concentrating, dizziness.    How to treat a low blood sugar if you are able to eat/drink: The Rule of 15/15  If you are using continuous glucose monitor that says you are low, but you do not have any symptoms, verify on fingerstick that your blood sugar is actually low before treating.   Eat 15 grams of carbs (see examples below)  Check your blood sugar after 15 minutes. If it’s still below your target range, have another serving.   Repeat these steps until it’s in your target range. Once it’s in range, if you're nervous about your sugar going low again, have a protein source (ie, a spoonful of peanut butter).   If you have a CGM you want to look for how your arrow has changed. If you arrow is pointed up or sideways after 15 min, give your CGM more time OR check with a finger stick. Try not to eat more food until at least 15 min after the first BG check - otherwise you risk having a rebound high.  If you are experiencing symptoms and you are unable to check your blood glucose for any reason, treat the  hypoglycemia.  If someone has a low blood sugar and is unconscious: Don’t hesitate to call 911. If someone is unconscious and glucagon is not available or someone does not know how to use it, call 911 immediately.     To treat a low, I recommend you carry with you easy, pre-portioned treatment for low blood sugars that are 15G of carbs:   - Children sized squeeze pouch applesauce (high fiber + carbs help prevent too high of a spike)  - Small children's sized juicebox- 15g carb --> 4oz juice box  - Glucose tablets from [employer], you can find them near diabetes supplies --> Note, you will need to eat 3-4 tablets to get to 15g of carbs  - Children sized fruit snack pack- look for one with 15 grams of total carbohydrate  - Choice of how to treat your low is important. Complex carbs, or foods that contain fats along with carbs (like chocolate) can slow the absorption of glucose and should NOT be used to treat an emergency low

## 2025-04-29 NOTE — PROGRESS NOTES
Tristin Gonzalez  YOB: 1990  Last upload date: Apr 29, 2025, 7:45 AM  t:slim X2 (#[id number])    2 Weeks (Apr 16 - 29, 2025)  Target Glucose Range: 70 - 180 mg/dL    CGM summary    Average reading           156 mg/dL          Time in range             74%                Time CGM in use           100%               Standard deviation        47 mg/dL           Coefficient of variation  30%                GMI                       7.1%                     Time in range comparison    Current 2 weeks      >  250 mg/dL       4.7%               181  -  250 mg/dL  21%                70  -  180 mg/dL   74%                54  -  69 mg/dL    0.3%               <  54 mg/dL        0%                   Previous 2 weeks     >  250 mg/dL       2.2%               181  -  250 mg/dL  19%                70  -  180 mg/dL   78%                54  -  69 mg/dL    0.9%               <  54 mg/dL        0.3%                     Control-IQ summary    Time active      91%              12d 3 hr           CGM inactive     5%               16 hr              Control-IQ off   1%               4 hr               Pump inactive    3%               8 hr                 Average sleep    Duration         8 hr               Weekly           7 times              Average exercise    Duration         0 hr               Weekly           0 times                  Insulin summary    Average daily dose     38.01 units        Basal                  57%              21.82 units        Bolus                  43%              16.20 units        Average daily boluses  13 boluses         Manual                 65%              9 boluses          Control-IQ             35%              5 boluses          Average daily carbs    92g                      Bolus review    Type               Food             43%              6.89 units         Correction       10%              1.60 units         Override         28%              4.47 units         Control-IQ        20%              3.24 units           Delivery Method    Standard         80%              12.95 units        Extended         0%               0.00 units         Quick            0%               0.00 units         Control-IQ       20%              3.24 units               Load activity    Cartridge change  every 2.5d         Tubing fill       every 2.5d         Cannula fill      every 3.1d

## 2025-05-14 ENCOUNTER — TELEPHONE (OUTPATIENT)
Facility: CLINIC | Age: 35
End: 2025-05-14

## 2025-05-14 RX ORDER — DASIGLUCAGON 0.6 MG/.6ML
0.6 INJECTION, SOLUTION SUBCUTANEOUS ONCE AS NEEDED
Qty: 0.6 ML | Refills: 2 | Status: SHIPPED | OUTPATIENT
Start: 2025-05-14 | End: 2025-05-14

## 2025-05-14 NOTE — TELEPHONE ENCOUNTER
Received fax from [employer] stating Gvoke hypopen requires a PA. Initiated PA via Epic.     Began answering questions for PA. Received following question:  \"Has the provider submitted medical records (for example, chart notes) confirming trial and failure within the past 180 days, or intolerance to ONE of the following preferred drugs: (1) Baqsimi, (2) generic glucagon kit, (3) Glucagon (manufactured by [employer]), (4) Zegalogue? Failure to submit medical records may lead to an adverse determination.\"    Routing for review.

## 2025-06-18 ENCOUNTER — PATIENT MESSAGE (OUTPATIENT)
Facility: CLINIC | Age: 35
End: 2025-06-18

## 2025-07-02 DIAGNOSIS — O24.012: ICD-10-CM

## 2025-07-02 RX ORDER — ACYCLOVIR 400 MG/1
TABLET ORAL
Qty: 9 EACH | Refills: 1 | Status: SHIPPED | OUTPATIENT
Start: 2025-07-02

## 2025-07-02 NOTE — TELEPHONE ENCOUNTER
Endocrine Refill protocol for CGM supplies     Protocol Criteria:  PASSED Reason: N/A    If below requirement is met, send a 90-day supply with 1 refill per provider protocol.     Verify appointment with Endocrinology completed in the last 12 months or scheduled in the next 6 months     Last completed office visit:12/17/2024 Beatriz Coleman APN   Last completed telemed visit: 4/29/2025 Beatriz Coleman APN  Next scheduled Follow up:   Future Appointments   Date Time Provider Department Center   7/29/2025  9:15 AM Beatriz Coleman APN [facility] [place]

## 2025-07-27 DIAGNOSIS — O24.012: ICD-10-CM

## 2025-07-28 RX ORDER — INSULIN LISPRO 100 [IU]/ML
INJECTION, SOLUTION INTRAVENOUS; SUBCUTANEOUS
Qty: 70 ML | Refills: 1 | Status: SHIPPED | OUTPATIENT
Start: 2025-07-28

## 2025-07-28 NOTE — TELEPHONE ENCOUNTER
Endocrine Refill protocol for oral and injectable diabetic medications    Protocol Criteria:  FAILED  Reason: No labs completed in required time frame    If all below requirements are met, send a 90-day supply with 1 refill per provider protocol.    Verify appointment with Endocrinology completed in the last 6 months or scheduled in the next 3 months.  Verify A1C has been completed within the last 6 months and is below 8.5%     Last completed office visit: 4/29/2025 Beatriz Coleman APN   Next scheduled Follow up:   Future Appointments   Date Time Provider Department Center   7/29/2025  9:15 AM Beatriz Coleman APN [facility] [place]      Last A1c result: Last A1c value was 6.3% done 12/17/2024.    Routed for your review.

## (undated) NOTE — LETTER
2024      Autumn Perales MD  [address]  [address]  [facility] [address]  Via Fax: [phone]  Patient: Tristin Gonzalez  : 1990    Dear Colleague:  Thank you for referring your patient to me for a Maternal Fetal Medicine evaluation. Please see my attached note for my findings and recommendations.      Should you have any questions or concerns, please do not hesitate to contact me at the number listed below.    Best Regards,      Randi Oswald MD  [facility]   [address]  [facility] [address] [phone]    cc: No Recipients      [facility] Department of Maternal Fetal Medicine  Patient Name: Tristin Gonzalez  Patient : 1990  Physician: Randi Oswald MD    Pt here for level II ultrasound/doctor consult  + FM  No complaints    Outpatient Maternal-Fetal Medicine Consultation    Dear Dr. Perales    Thank you for requesting ultrasound evaluation and maternal fetal medicine consultation on your patient Tristin Gonzalez.  As you are aware she is a 33 year old female  with a lobo pregnancy and an Estimated Date of Delivery: 7/10/24.  A maternal-fetal medicine consultation was requested secondary to Advanced Maternal Age and Diabetes, pre-gestational.  Her prenatal records and labs were reviewed.    She is on a pump.  Managed by Endo.  Diabetes type diagnosed at age 6.  Only has had 1 episode potentially of DKA.  She is overall easily controlled.  No retinopathy.  No kidney disease from the diabetes.  ROS    HISTORY  OB History    Para Term  AB Living   3 2 2   0     SAB IAB Ectopic Multiple Live Births                  # Outcome Date GA Lbr Peter/2nd Weight Sex Delivery Anes PTL Lv   3 Current            2 Term            1 Term                Allergies:  No Known Allergies   Current Meds:  Current Outpatient Medications   Medication Sig Dispense Refill    prenatal vitamin with DHA 27-0.8-228 MG Oral Cap Take 1 capsule by mouth daily.       cholecalciferol (VITAMIN D3) 125 MCG (5000 UT) Oral Cap Take 1 capsule (5,000 Units total) by mouth daily.      aspirin 81 MG Oral Tab EC Take 1 tablet (81 mg total) by mouth daily.      ferrous sulfate 325 (65 FE) MG Oral Tab EC Take 1 tablet (325 mg total) by mouth every other day.      insulin lispro (HUMALOG) 100 UNIT/ML Injection Solution Used to fill insulin pump with max 60 u daily.  3-month supply 60 mL 1    Glucose Blood (CONTOUR NEXT TEST) In Vitro Strip Use to test BG up to four times daily. 400 strip 3    Continuous Blood Gluc Sensor (DEXCOM G7 SENSOR) Does not apply Misc 1 each Every 10 days. 9 each 1    glucagon (GVOKE HYPOPEN 2-PACK) 1 MG/0.2ML Subcutaneous SUBQ injection Inject 0.2 mL (1 mg total) into the skin as needed. Patient trained on Gvoke. No substitutions 0.4 mL 1    Insulin Pen Needle (BD PEN NEEDLE CARMEN U/F) 32G X 4 MM Does not apply Misc Inject 1 Pen into the skin daily. Use as insulin pump back up      insulin glargine (LANTUS SOLOSTAR) 100 UNIT/ML Subcutaneous Solution Pen-injector Inject 17 Units into the skin nightly.      busPIRone 5 MG Oral Tab Take 2 tablets (10 mg total) by mouth daily.      OneTouch Delica Lancets 33G Does not apply Misc 1 Lancet by Finger stick route As Directed.      Glucose Blood (ONETOUCH VERIO) In Vitro Strip 1 each by Other route 4 (four) times daily.      Microlet Lancets Does not apply Misc 1 each by Other route TID & HS.          HISTORY:  Past Medical History:   Diagnosis Date    Insulin pump in place     Type 1 diabetes mellitus (HCC)       Past Surgical History:   Procedure Laterality Date    HC  SECTION LEVEL I  2020      SECTION LEVEL I  2017    WISDOM TEETH REMOVED        Family History   Problem Relation Age of Onset    Other (multiple sclerosis) Mother     Other (Hemochromatosis) Father     Asthma Brother     Colon Cancer Maternal Grandmother     Other (CABG) Maternal Grandfather     Dementia Maternal Grandfather      Other (Coronary artery disease) Maternal Grandfather     Colon Cancer Paternal Grandmother 70    Blood Cancer Paternal Grandfather 70        Lymphoma?      Social History     Socioeconomic History    Marital status:    Tobacco Use    Smoking status: Never    Smokeless tobacco: Never   Substance and Sexual Activity    Alcohol use: Yes     Alcohol/week: 1.0 standard drink of alcohol     Types: 1 Glasses of wine per week     Comment: 1 per week occasionally    Drug use: Never    Sexual activity: Yes          PHYSICAL EXAMINATION:  /78 (BP Location: Right arm, Patient Position: Sitting, Cuff Size: adult)   Pulse 75   Ht 5' 6\" (1.676 m)   Wt 144 lb (65.3 kg)   BMI 23.24 kg/m²   Physical Exam  Constitutional:       Appearance: Normal appearance.   Abdominal:      Palpations: Abdomen is soft.      Tenderness: There is no abdominal tenderness.   Neurological:      Mental Status: She is alert.   Psychiatric:         Mood and Affect: Mood normal.         Behavior: Behavior normal.           OBSTETRIC ULTRASOUND  The patient had a level II ultrasound today which revealed size consistent with dates and a normal detailed anatomic survey.  Ultrasound Findings:  Single IUP in cephalic presentation.    Placenta is posterior.   A 3 vessel cord is noted.  Cardiac activity is present at 131 bpm   g ( 0 lb 13 oz);   MVP is 3 cm .     The fetal measurements are consistent with the established EDC. No ultrasound evidence of structural abnormalities are seen today. The nasal bone is present. No ultrasound evidence of markers for aneuploidy are seen. She understands that ultrasound exam cannot exclude genetic abnormalities and that genetic testing is recommended. The limitations of ultrasound were discussed.     Uterus and adnexa appeared WNL today on US  See PACS/Imaging Tab For Complete Ultrasound Report  I interpreted the results and reviewed them with the patient.    DISCUSSION  During her visit we discussed and  reviewed the following issues:  ADVANCED MATERNAL AGE    Background  I reviewed with the patient that pregnancies in women of advanced maternal age (35 or older at delivery) are associated with elevated risks. Specifically, there is a higher rate of:  Fetal malformations  Preeclampsia  Gestational diabetes  Intrauterine fetal death    As a result, enhanced pregnancy surveillance is advised for these patients including a comprehensive ultrasound to assess for fetal malformations (at 20 weeks) and a third trimester ultrasound assessment for fetal growth (at 32 weeks). In addition, weekly NST's (initiating at 36 weeks gestation for women 35-39 years and at 32 weeks gestation for women 40 years and older) are also advised. Routine obstetric care is more than adequate to assess for gestational diabetes and preeclampsia; hence, no further significant alterations in obstetric care are advised.    Medical Complications    Women 35 years of age or older can expect to experience two to three fold higher rates of hospitalization,  delivery, and pregnancy-related complications when compared to their younger counterparts.  The two most common medical problems complicating these  pregnanccies are hypertension and diabetes.   The incidence of preeclampsia in the general obstetric population is 3 to 4 percent; this increases to 5 to 10 percent in women over age 40 and is as high as 35 percent in women over age 50.   The incidence of gestational diabetes in the general obstetric population is 3 percent, rising to 7 to 12 percent in women over age 40 and 20 percent in women over age 50.  Women 35 years of age or older are more likely to be delivered by . The  delivery rate in the general obstetric population of the [place] is almost 30 percent, compared to almost 50 percent in women over age 40 to 45 and almost 80 percent in women age 50 to 63.          Fetal Death        A decision analysis tool using  data from the [employer]  [employer] predicted a strategy of weekly antepartum testing and labor induction would lower the risk of unexplained fetal death in women 35 years of age or older. In this model, weekly testing starting at 36 weeks of gestation would drop the risk of fetal death from 5.2 to 1.3 per 1000 pregnancies. While a policy of antepartum testing in older women does increase the chance that a women will be induced (71 inductions per fetal death averted) and thereby increases her risk of having a  delivery, only 14 additional cesareans would need to be performed to avert one unexplained fetal death.  Hence, weekly NST's are advised for women of advanced maternal age; testing should be initiated at 36 weeks for women 35-39 years and at 32 weeks for women 40 years and older.    Fetal Malformations    Cardiac malformations, clubfoot, and diaphragmatic hernia appear to occur with increased frequency in offspring of older women. These abnormalities are structural and unrelated to aneuploidy, thus they would not be detected by karyotype analysis.  For these reasons a complete, detailed ultrasound (level II) is advised even if the fetus has a normal karyotype.      Fetal Aneuploidy      Invasive Testing  I offered invasive genetic testing (amniocentesis, chorionic villus sampling) after reviewing the diagnostic accuracy of these tests as well as the procedure associated loss rate (1:500 for genetic amniocentesis).    She ultimately does not desire invasive genetic testing.     We discussed  the increased risk of chromosomal abnormalities associated with advanced maternal age at age  34 year old. She understands that ultrasound exam cannot exclude potential genetic abnormalities.  Her estimated risk based on maternal age at term with any chromosome abnormality is about 1: 150 and with Down Syndrome is about 1: 267.   We also discussed the risks and benefits of having  genetic testing ([facility]  and amniocentesis) performed.      Non-invasive Pregnancy Testing (NIPT)  I reviewed current non-invasive screening options. Currently non-invasive pregnancy testing (NIPT) offers the highest detection rate (with the lowest false positive rate) for the detection of fetal aneuploidy amongst high-risk patients. The limitations of detailed mid-trimester sonography was reviewed with the patient. First trimester screening and second trimester multiple-marker serum serum screening as alternative aneuploidy screening options were also reviewed. However, both of these tests are associated with lower detection and higher false positive rates.    DIABETES MELLITUS  Diabnosed age 6.  One episode on DKA possible.    We discussed the risks associated with pregestational diabetes.  There is an increased risk of congenital malformations, fetal growth disturbances, preeclampsia, spontaneous  and intrauterine fetal demise (IUFD).  Infants of diabetic mothers (IDMs) are also at increased risk for hypoglycemia and hyperbilirubinemia.    Congenital Malformations:  Data from multiple studies have consistently shown a higher risk of major congenital malformations and miscarriage associated with increasing first trimester glycated hemoglobin values. Although glycated hemoglobin values from different laboratories may not be comparable because of differences in methodology and a lack of standardization among laboratories, a value >1 percent above the upper limit of the normal range is associated with an increased risk of congenital anomalies.  Patients with a markedly elevated glycated hemoglobin value have a markedly increased risk of congenital anomalies, particularly neural tube and cardiac defects. I informed the patient that more information about fetal development will be obtained from first and second trimester sonographic examinations and maternal serum analyte results.     Fetal Growth Disturbances:   Maternal diabetes  mellitus may double the incidence of LGA infants; it also changes the anthropometric measurements of infants of diabetic mothers (IDMs) compared with offspring of women without diabetes. Specifically, the chest-to-head and shoulder-to-head ratios are increased in IDMs.  LGA fetuses are at increased risk for a prolonged second stage of labor, shoulder dystocia, operative delivery, maternal and infant birth trauma, and  death. Maternal diabetes mellitus increases the likelihood of shoulder dystocia two- to six-fold compared to the population without diabetes and increases the likelihood of dystocia-associated fetal morbidity, such as brachial plexus injury. Accelerated fetal growth is also associated with an increased risk of  metabolic and physiologic disturbances. Continued control of blood glucose concentration during the third trimester is important to minimize the risk of these complications.   Impaired fetal growth is more common among women with diabetic vasculopathy and/or superimposed preeclampsia. It is associated with increased fetal and  morbidity and mortality, and has long-term health implications.  If there is evidence of intrauterine growth restriction, which is uncommon, but often related to preeclampsia or preexisting maternal vasculopathy, tests of fetal well-being are initiated.    Preeclampsia:  The incidences of hypertension and preeclampsia are increased in pregnant women with diabetes and are related to pregestational hypertension and vascular and renal disease. Poor glycemic control also appears to play a role.  Diagnosis and management of preeclampsia are similar to that in women without diabetes, except among those who enter pregnancy with preexisting nephropathy. In these women, diagnosing preeclampsia can be difficult and requires relying on deterioration of other markers.    Intrauterine Fetal Death (IUFD):  Intrauterine fetal demise is now a rare complication of  diabetic pregnancy, primarily due to achievement of good glycemic control. The fetus of the diabetic mother is at risk for hypoxia primarily from two mechanisms: (1) fetal hyperglycemia and hyperinsulinemia increase fetal oxygen consumption, which may induce fetal hypoxemia and acidosis if the oxygen needs of the fetus are not met and (2) maternal vasculopathy and hyperglycemia can lead to reduced uteroplacental perfusion, which may be associated with reduced fetal growth.  The American College of Obstetricians and Gynecologists (ACOG) recommends antepartum fetal testing for pregnancies complicated by pregestational diabetes. There are no data from large or randomized trials on which to make an evidenced-based recommendation as to which pregnancies complicated by diabetes should undergo fetal surveillance, when to start, what test to order, or how often to perform it.   Most experts agree starting weekly NST's at 32 weeks of gestation and increasing the frequency of testing to two times per week from 36 weeks until delivery. In complicated patients (IUGR, oligohydramnios, preeclampsia, or poorly controlled blood glucose concentrations) testing may start earlier in gestation and is performed more frequently. Any significant deterioration in maternal status necessitates reevaluation of the fetus. The frequency of intrauterine fetal death (excluding congenital malformations) with such protocols is approximately 3 per 1000 pregnancies in women with type 1 diabetes.    Glycemic Control:  We discussed goals of optimal glucose control: Fasting levels between 60 - 95, and - hour postprandial values of less than 120.  Blood sugars should be check at least 4 times daily and should be sent to the MFM office for weekly MFM physician review.     Maternal Testing:  Recommendations for pregnancy testing were discussed with the patient. I advised regular  opthomalogical evaluation (with repeat evaluation as indicated), ECG, 24 hour  urine collection for protein and creatinine clearance, as well as serum labs including a complete metabolic profile, CBC and HgB A1C.    Fetal Evaluation:  Fetal testing was also reviewed with the patient.  First trimester dating and early anatomic assessment is advised as well as a targeted ultrasound at 20-22 weeks gestation and a fetal echocardiogram at 22-24 weeks gestation.  In addition, growth ultrasounds should be performed every 4 weeks in the third trimester.  Finally, weekly NST evaluations should be initiated at 32 weeks gestation and increased to twice weekly at 36 weeks.      Managed by endo.    Prevention of Preeclampsia    Antiplatelet Agents  The observation that preeclampsia is associated with increased platelet turnover and increased platelet-derived thromboxane levels led to randomized trials evaluating low-dose aspirin therapy in women thought to be at increased risk of the disease. As opposed to higher dose aspirin therapy, low-dose aspirin (60 to 150 mg per day) diminishes platelet thromboxane synthesis while maintaining vascular wall prostacyclin synthesis. Although not well studied, the beneficial effect of low-dose aspirin for prevention of preeclampsia may also be in part related to modulation of inflammation. The drug has been studied for both prevention of preeclampsia and prevention of progression from mild to severe disease. It appears to result in a modest reduction in risk of preeclampsia when given to women at moderate to high risk of preeclampsia.  This approach has been studied in over 35,000 women, for both prevention of preeclampsia and prevention of progression of the disease. Low dose aspirin has a modest impact on pregnancy outcome; it reduces the risk of preeclampsia, as well as other adverse pregnancy outcomes (eg,  delivery, fetal growth restriction) by about 10 to 20 percent. Low dose aspirin is safe in pregnancy; thus, it is a reasonable strategy in women with a  moderate to high risk of preeclampsia in whom the benefits outweigh the risks.     Clinical Guidelines  Based on the available data, low-dose aspirin is advised for women at high risk for preeclampsia. There is no consensus on the criteria that confer high risk. The [place] Preventive Services Task Force (USPSTF) risk criteria are reasonable.  USPSTF criteria for high risk include one or more of the following:   Previous pregnancy with preeclampsia, especially early onset and with an adverse outcome   Multifetal gestation  Chronic hypertension   Type 1 or 2 diabetes mellitus  Chronic kidney disease  Autoimmune disease (antiphospholipid syndrome, systemic lupus erythematosus)     Women with multiple moderate risk factors for preeclampsia are considered high risk, as well. Identification of women with an appropriate combination of moderate risk factors to be considered high risk is subjective and determined case by case, as the data describing the magnitude of the association between each of these risk factors and development of preeclampsia vary widely and lack consistency. USPSTF criteria for moderate risk include:  Nulliparity  Obesity (body mass index >30 kg/m2)  Family history of preeclampsia in mother or sister  Age ?35 years  Sociodemographic characteristics (, low socioeconomic level)  Personal risk factors (eg, history of low birth weight or small for gestational age, previous adverse pregnancy outcome, >10 year pregnancy interval)     If used, daily low-dose aspirin should be initiated in the 12th or 13th week of gestation, although adverse effects from earlier initiation (eg, preconception) have not been documented. The optimum low dose is unclear; 81 mg is readily available, but 100 or 150 mg (which are not available in some countries including the [place] may be more effective.  In some pregnant women, platelet function is not inhibited by the 81 mg dose but is altered by  higher dosing. Hence, current evidence has suggested a daily dose of 100 to 150 mg of aspirin rather than a lower dose. In the [place], this can be achieved by taking one and one-half 81 mg tablets; however, taking one 81 mg tablet is also reasonable since this is the commercially available dose and has proven efficacy. For prevention of preeclampsia, no trials have evaluated the efficacy of a higher dose of aspirin, which could be achieved easily by taking two 81 mg tablets or splitting a 325 mg tablet. For prevention of myocardial infarction and stroke in nonpregnant individuals, aspirin appears to be equally effective at doses between 75 and 325 mg per day.  The safety of low-dose aspirin use in the second and third trimesters is well established, but questions linger regarding use in the first trimester (possible increase in minor vaginal bleeding or gastroschisis). Early therapy (before 16 weeks) may be important since the pathophysiologic features of preeclampsia develop at this time, weeks before clinical disease is apparent. However, available evidence is inconsistent about the importance of early initiation of therapy, possibly because aspirin has major effects on prostacyclin production and endothelial function throughout gestation.  Aspirin is discontinued 5 to 10 days before expected delivery to diminish the risk of bleeding during delivery; however, no adverse maternal or fetal effects related to low-dose aspirin have been proven.  Major questions remain as to which, if any, subgroups of women are more likely to benefit from low-dose aspirin therapy, when treatment should be started (first or second trimester), and the optimum dose to inhibit placental PGH synthase (cyclooxygenase) and also allow the anti-inflammatory effects of low-dose aspirin.          Despite extensive clinical and epidemiologic research, the etiology of placental abruption is heterogeneous and speculative. Risk factors for placental  abruption can be classified as those associated with an acute etiology;  medical and obstetrical risk factors; and  maternal sociodemographic and behavioral risk factors.     Medical and obstetrical risk factors for placental abruption include the hypertensive disorders of pregnancy, premature rupture of membranes, chorioamnionitis (especially at  gestational ages), ischemic placental disease in the current or previous pregnancy (including previous placental abruption, small for gestational age infant, obesity and preeclampsia), and inherited thrombophilia.  Trauma is a factor for acute abruption and fibroids may increase the risk.         As many as 10 percent of women using cocaine in the third trimester will suffer placental abruption. The pathophysiological effect of cocaine in the mynor of abruption is unknown, but may be related to cocaine-induced acute vasoconstriction leading to ischemia, reflex vasodilatation, and disruption of vascular integrity. We do not routinely perform toxicology screening on all patients with unexplained placental abruption, but we do obtain urine toxicology tests in selected patients.       Smoking is associated with a 2.5 fold increased risk of abruption severe enough to result in fetal death; the risk increases by 40 percent for each pack per day smoked. The effects of cigarette smoking and hypertension are synergistic. A possible mechanism is that the vasoconstrictive effects of cigarette smoking may cause placental hypoperfusion, which could result in decidual ischemia, necrosis, and hemorrhage leading to placental separation. Chorionic villous hemorrhage and intervillous thrombosis are observed in abruptions among cigarette smokers.           Women with placental abruption are at several-fold higher risk of abruption in a subsequent pregnancy. The risk of recurrence has been reported to be 5 to 15 percent, compared to a baseline incidence of 0.4 to 1.3 percent in the  general population. After two consecutive abruptions, the risk of a third rises to 20 to 25 percent. The risk of recurrence is higher after a severe abruption than after a mild abruption, and the woman's sisters appear to be at increased risk of also having an abruption. When the abruption is severe enough to kill the fetus, there is a 7 percent incidence of the same outcome in a future pregnancy.        Unfortunately, there are no studies demonstrating that any intervention lowers this risk. Nonetheless, it is reasonable to identify risk factors for abruption and address those risk factors that are modifiable. Women who smoke or use cocaine should be encouraged to stop. Poorly controlled hypertension should be controlled. These changes have proven health benefits even in the absence of pregnancy. On the other hand, placental abruption resulting from trauma is not likely to recur, so these women can be reassured.        There are no laboratory screening tests that are predictive of an increased risk for abruption. Testing women with a history of abruption for antiphospholipid antibodies or an inherited thrombophilia is not indicated unless other factors are present that could be associated with thrombophilia.     She has type 1 diabetes and has been delivered at 35 weeks for an abruption and then was delivered more in her last pregnancy at 37 weeks for elevated blood pressure.  Therefore I recommended that she be delivered at 38 weeks this pregnancy .    IMPRESSION:  IUP at 20w0d   AMA --NIPT low risk, declines invasive testing   Type I diabetes  History abruption at 35 weeks in Preg #1  History elevated BP and delivery 37 weeks Preg #2  Prior  x 2    RECOMMENDATIONS:  Continue care with Dr. Perales  Check glucoses 4x/day and send to Endo weekly  Fetal heart study at 22-24 wks    Monthly growth US in third trimester with BPP at or beyond 32 weeks  Weekly NST at    32 wks / twice weekly at  34  wks      Hgb  A1C q trimester  Baseline urine P/C ratio , CMP or AST/ALT/creatinine, TSH  EKG     Ophthalmology exam      Insulin as above  Aspirin 81-120mg daily   Delivery at 38 weeks          Thank you for allowing me to participate in the care of your patient.  Please do not hesitate to contact me if additional questions or concerns arise.      Randi Oswald M.D.    40 minutes spent in review of records, patient consultation, documentation and coordination of care.  The relevant clinical matter(s) are summarized above.     Note to patient and family  The 21st Century Cures Act makes medical notes available to patients in the interest of transparency.  However, please be advised that this is a medical document.  It is intended as guij-fu-exlu communication.  It is written and medical language may contain abbreviations or verbiage that are technical and unfamiliar.  It may appear blunt or direct.  Medical documents are intended to carry relevant information, facts as evident, and the clinical opinion of the practitioner.

## (undated) NOTE — LETTER
4/29/2025    Tristin Gonzalez  [address] [facility] [address]    Subject: Diabetes and Traveling with Insulin    To Whom It May Concern:     Tristin Gonzalez  is under my care for Diabetes and requires insulin medication for management of her diabetes. She currently uses an insulin pump to deliver insulin which is required for her survival.  The insulin pump uses infusion sets and reservoirs to deliver insulin.  This device is required to stay attached to the patient, otherwise they could experience a life-threatening consequence known as diabetic ketoacidosis. In addition to the insulin pump, Tristin Gonzalez may be wearing a device on the skin called Continuous Glucose Monitoring System. It cannot be removed from the patient.   TSA full body scans or security magnetometers can cause insulin pump or glucose monitor malfunction. Patients who are currently on an insulin pump or glucose monitoring system should have a pat-down or metal detector check instead.    Low blood sugar is also a possibility which would require treatment with glucose tablets, juice boxes or other acceptable substitutes in order to increase the blood sugar back up to a safe level. Several juice boxes must be available on the airplane and are only commercially available in 4 ounce size or larger. Therefore, it is essential that she have in possession at all times: glucose tablets, juice boxes or other acceptable substitutes in the event a low blood sugar occurs. In addition to  the above items, the patient must have at all times the following items for patient safety:  Lancet device, lancets, glucometer  Testing strips  Alcohol swabs  Glucagon emergency kit  Glucose replacement (juice boxes or tabs) in the event of hypoglycemia (low blood sugar)   The above items cannot be stored in the checked luggage; they must be with the patient in carry-on luggage to avoid instability and access.      If you have any questions regarding this patient and  her medical care and requirements, please contact my office at [phone].  Sincerely,          YEMI Hull

## (undated) NOTE — LETTER
2024      Autumn Perales MD  [address]  [address]  [facility] [address]  Via Fax: [phone]  Patient: Tristin Gonzalez  : 1990    Dear Colleague:  Thank you for referring your patient to me for a Maternal Fetal Medicine evaluation. Please see my attached note for my findings and recommendations.      Should you have any questions or concerns, please do not hesitate to contact me at the number listed below.    Best Regards,      Randi Oswald MD  [facility]   [address]  [place] [id number] [phone]    cc: No Recipients      [facility] Department of Maternal Fetal Medicine  Patient Name: Tristin Gonzalez  Patient : 1990  Physician: Randi Oswald MD    Pt here for growth ultrasound  + FM  No complaints    Outpatient Maternal-Fetal Medicine Consultation    Dear Dr. Perales    Thank you for requesting ultrasound evaluation and maternal fetal medicine consultation on your patient Tristin Gonzalez.  As you are aware she is a 33 year old female  with a lobo pregnancy and an Estimated Date of Delivery: 7/10/24.  A maternal-fetal medicine f/u is today. Her prenatal records and labs were reviewed.    Tearful as her 6 yo dog was diagnosed with cancer and will need to be put down this week.  ROS    HISTORY  OB History    Para Term  AB Living   3 2 2   0     SAB IAB Ectopic Multiple Live Births                  # Outcome Date GA Lbr Peter/2nd Weight Sex Type Anes PTL Lv   3 Current            2 Term            1 Term                Allergies:  No Known Allergies   Current Meds:  Current Outpatient Medications   Medication Sig Dispense Refill    insulin lispro (HUMALOG) 100 UNIT/ML Injection Solution Used to fill insulin pump with max 70u daily.  3-month supply 70 mL 0    prenatal vitamin with DHA 27-0.8-228 MG Oral Cap Take 1 capsule by mouth daily.      cholecalciferol (VITAMIN D3) 125 MCG (5000 UT) Oral Cap Take 1 capsule (5,000 Units  total) by mouth daily.      aspirin 81 MG Oral Tab EC Take 1 tablet (81 mg total) by mouth daily.      ferrous sulfate 325 (65 FE) MG Oral Tab EC Take 1 tablet (325 mg total) by mouth every other day.      insulin glargine (LANTUS SOLOSTAR) 100 UNIT/ML Subcutaneous Solution Pen-injector Inject 17 Units into the skin nightly. To use in the event of pump failure.      Glucose Blood (CONTOUR NEXT TEST) In Vitro Strip Use to test BG up to four times daily. 400 strip 3    Continuous Blood Gluc Sensor (DEXCOM G7 SENSOR) Does not apply Misc 1 each Every 10 days. 9 each 1    glucagon (GVOKE HYPOPEN 2-PACK) 1 MG/0.2ML Subcutaneous SUBQ injection Inject 0.2 mL (1 mg total) into the skin as needed. Patient trained on Gvoke. No substitutions 0.4 mL 1    Insulin Pen Needle (BD PEN NEEDLE CARMEN U/F) 32G X 4 MM Does not apply Misc Inject 1 Pen into the skin daily. Use as insulin pump back up      busPIRone 5 MG Oral Tab Take 2 tablets (10 mg total) by mouth daily. (Patient not taking: Reported on 3/20/2024)      OneTouch Delica Lancets 33G Does not apply Misc 1 Lancet by Finger stick route As Directed.      Glucose Blood (ONETOUCH VERIO) In Vitro Strip 1 each by Other route 4 (four) times daily.      Microlet Lancets Does not apply Misc 1 each by Other route TID & HS.          HISTORY:  Past Medical History:    Insulin pump in place    Type 1 diabetes mellitus (HCC)      Past Surgical History:   Procedure Laterality Date      section level i  2020      section level i  2017    [place] teeth removed        Family History   Problem Relation Age of Onset    Other (multiple sclerosis) Mother     Other (Hemochromatosis) Father     Asthma Brother     Colon Cancer Maternal Grandmother     Other (CABG) Maternal Grandfather     Dementia Maternal Grandfather     Other (Coronary artery disease) Maternal Grandfather     Colon Cancer Paternal Grandmother 70    Blood Cancer Paternal Grandfather 70        Lymphoma?       Social History     Socioeconomic History    Marital status:    Tobacco Use    Smoking status: Never    Smokeless tobacco: Never   Substance and Sexual Activity    Alcohol use: Yes     Alcohol/week: 1.0 standard drink of alcohol     Types: 1 Glasses of wine per week     Comment: 1 per week occasionally    Drug use: Never    Sexual activity: Yes          PHYSICAL EXAMINATION:  /76 (BP Location: Right arm, Patient Position: Sitting, Cuff Size: adult)   Pulse 80   Ht 5' 6\" (1.676 m)   Wt 152 lb (68.9 kg)   BMI 24.53 kg/m²   Physical Exam  Constitutional:       Appearance: Normal appearance.   Abdominal:      Palpations: Abdomen is soft.      Tenderness: There is no abdominal tenderness.   Neurological:      Mental Status: She is alert.   Psychiatric:         Mood and Affect: Mood normal.         Behavior: Behavior normal.           OBSTETRIC ULTRASOUND  The patient had a follow-up growth ultrasound today which revealed normal interval fetal growth.   Ultrasound Findings:  Single IUP in cephalic presentation.    Placenta is posterior.   A 3 vessel cord is noted.  Cardiac activity is present at 141 bpm  EFW 1338 g ( 2 lb 15 oz); 69%.    MVP is 4.3 cm . MONTSERRAT 14.3 cm    The fetal measurements are consistent with established EDC. No gross ultrasound evidence of structural abnormalities are seen today. The patient understands that ultrasound cannot rule out all structural and chromosomal abnormalities.   See PACS/Imaging Tab For Complete Ultrasound Report  I interpreted the results and reviewed them with the patient.    DISCUSSION  During her visit we discussed and reviewed the following issues:  ADVANCED MATERNAL AGE    Background  I reviewed with the patient that pregnancies in women of advanced maternal age (35 or older at delivery) are associated with elevated risks. Specifically, there is a higher rate of:  Fetal malformations  Preeclampsia  Gestational diabetes  Intrauterine fetal death   Please see  previous [facility] detailed discussion.     DIABETES MELLITUS  Diagnosed age 6.  One episode on DKA possible.      She is on a pump.  Managed by Endo.  Diabetes type diagnosed at age 6.  Only has had 1 episode potentially of DKA.  She is overall easily controlled.  No retinopathy.  No kidney disease from the diabetes.     We discussed the risks associated with pregestational diabetes.  There is an increased risk of congenital malformations, fetal growth disturbances, preeclampsia, spontaneous  and intrauterine fetal demise (IUFD).  Infants of diabetic mothers (IDMs) are also at increased risk for hypoglycemia and hyperbilirubinemia.   Please see previous [facility] detailed discussion. Managed by endo.    Prevention of Preeclampsia   Please see previous [facility] detailed discussion.           Despite extensive clinical and epidemiologic research, the etiology of placental abruption is heterogeneous and speculative. Risk factors for placental abruption can be classified as those associated with an acute etiology;  medical and obstetrical risk factors; and  maternal sociodemographic and behavioral risk factors.  Please see previous [facility] detailed discussion.         There are no laboratory screening tests that are predictive of an increased risk for abruption. Testing women with a history of abruption for antiphospholipid antibodies or an inherited thrombophilia is not indicated unless other factors are present that could be associated with thrombophilia.     She has type 1 diabetes and has been delivered at 35 weeks for an abruption and then was delivered more in her last pregnancy at 37 weeks for elevated blood pressure.  Therefore I recommended that she be delivered at 38 weeks this pregnancy .    Pump and dexcom will need to be removed for surgery.       Discomforts of pregnancy discussed.  We discussed using heat packs, shoes with arch support, pregnancy support belt, compression stockings, massage therapy, and physical  therapy as options to manage the discomforts.    IMPRESSION:  IUP at 28w0d   AMA --NIPT low risk, declines invasive testing   Type I diabetes  History abruption at 35 weeks in Preg #1  History elevated BP and delivery 37 weeks Preg #2  Prior  x 2    RECOMMENDATIONS:  Continue care with Dr. Perales  Check glucoses 4x/day and send to Endo weekly  Monthly growth US in third trimester with BPP at or beyond 32 weeks  Weekly NST at    32 wks / twice weekly at  34  wks      Hgb A1C q trimester  Baseline urine P/C ratio , CMP or AST/ALT/creatinine, TSH  EKG     Ophthalmology exam      Insulin as above  Aspirin 81-120mg daily   Delivery at 38 weeks          Thank you for allowing me to participate in the care of your patient.  Please do not hesitate to contact me if additional questions or concerns arise.      Randi Oswald M.D.    20 minutes spent in review of records, patient consultation, documentation and coordination of care.  The relevant clinical matter(s) are summarized above.     Note to patient and family  The 21st Century Cures Act makes medical notes available to patients in the interest of transparency.  However, please be advised that this is a medical document.  It is intended as pmch-in-csaj communication.  It is written and medical language may contain abbreviations or verbiage that are technical and unfamiliar.  It may appear blunt or direct.  Medical documents are intended to carry relevant information, facts as evident, and the clinical opinion of the practitioner.

## (undated) NOTE — LETTER
Dear New MomRosy, we missed you! The nurses of [facility] have tried to reach you by phone to ask if you have any questions regarding your health or the health and care of your new little one.    We hope you are doing well. If, for any reason, you have questions or concerns about your health or your baby’s health, please contact your provider or your pediatrician or family medicine physician regarding your baby.     At [facility], we feel that postpartum support is very important for new families. Please see the enclosed new parent support flyer that lists support programs and resources with both in-person and online options.     Additionally, our Breastfeeding Centers at [facility] and [facility] in [place], offer outpatient visits with our International Board-Certified Lactation   Consultants (IBCLCs) for any breastfeeding concerns or questions you may have.    For issues related to stress, anxiety or depression, we have a Nurturing Mom support group that meets both in-person or online.  There’s also a 24-hour Mom’s Line where you can request a phone call from a clinical therapist for assistance for postpartum depression.    We encourage you to take advantage of these programs and resources as you recover from childbirth and learn to care for your new infant.    Best wishes,    [facility]            [phone]

## (undated) NOTE — LETTER
2024      Autumn Perales MD  [address]  [address]  [facility] [address]  Via Fax: [phone]  Patient: Tristin Gonzalez  : 1990    Dear Colleague:  Thank you for referring your patient to me for a Maternal Fetal Medicine evaluation. Please see my attached note for my findings and recommendations.      Should you have any questions or concerns, please do not hesitate to contact me at the number listed below.    Best Regards,      Randi Oswald MD  [facility]   [address]  [place] [id number] [phone]    cc: No Recipients      [facility] Department of Maternal Fetal Medicine  Patient Name: Tristin Gonzlaez  Patient : 1990  Physician: Randi Oswald MD    Pt here for fetal echo  + FM  No complaints    Outpatient Maternal-Fetal Medicine Consultation    Dear Dr. Perales    Thank you for requesting ultrasound evaluation and maternal fetal medicine consultation on your patient Tristin Gonzalez.  As you are aware she is a 33 year old female  with a lobo pregnancy and an Estimated Date of Delivery: 7/10/24.  A maternal-fetal medicine f/u is today. Her prenatal records and labs were reviewed.    Asks if pump will need to be off or will she be able to manage her pump during .    Having lower abdominal discomfort and feels popping in her hip or leg area when she tries to turn over in bed.  ROS    HISTORY  OB History    Para Term  AB Living   3 2 2   0     SAB IAB Ectopic Multiple Live Births                  # Outcome Date GA Lbr Peter/2nd Weight Sex Delivery Anes PTL Lv   3 Current            2 Term            1 Term                Allergies:  No Known Allergies   Current Meds:  Current Outpatient Medications   Medication Sig Dispense Refill    insulin lispro (HUMALOG) 100 UNIT/ML Injection Solution Used to fill insulin pump with max 70u daily.  3-month supply 70 mL 0    prenatal vitamin with DHA 27-0.8-228 MG Oral Cap Take 1  capsule by mouth daily.      cholecalciferol (VITAMIN D3) 125 MCG (5000 UT) Oral Cap Take 1 capsule (5,000 Units total) by mouth daily.      aspirin 81 MG Oral Tab EC Take 1 tablet (81 mg total) by mouth daily.      ferrous sulfate 325 (65 FE) MG Oral Tab EC Take 1 tablet (325 mg total) by mouth every other day.      Glucose Blood (CONTOUR NEXT TEST) In Vitro Strip Use to test BG up to four times daily. 400 strip 3    Continuous Blood Gluc Sensor (DEXCOM G7 SENSOR) Does not apply Misc 1 each Every 10 days. 9 each 1    glucagon (GVOKE HYPOPEN 2-PACK) 1 MG/0.2ML Subcutaneous SUBQ injection Inject 0.2 mL (1 mg total) into the skin as needed. Patient trained on Gvoke. No substitutions 0.4 mL 1    Insulin Pen Needle (BD PEN NEEDLE CARMEN U/F) 32G X 4 MM Does not apply Misc Inject 1 Pen into the skin daily. Use as insulin pump back up      insulin glargine (LANTUS SOLOSTAR) 100 UNIT/ML Subcutaneous Solution Pen-injector Inject 17 Units into the skin nightly. (Patient not taking: Reported on 3/20/2024)      busPIRone 5 MG Oral Tab Take 2 tablets (10 mg total) by mouth daily. (Patient not taking: Reported on 3/20/2024)      OneTouch Delica Lancets 33G Does not apply Misc 1 Lancet by Finger stick route As Directed.      Glucose Blood (ONETOUCH VERIO) In Vitro Strip 1 each by Other route 4 (four) times daily.      Microlet Lancets Does not apply Misc 1 each by Other route TID & HS.          HISTORY:  Past Medical History:   Diagnosis Date    Insulin pump in place     Type 1 diabetes mellitus (HCC)       Past Surgical History:   Procedure Laterality Date    HC  SECTION LEVEL I  2020      SECTION LEVEL I  2017    WISDOM TEETH REMOVED        Family History   Problem Relation Age of Onset    Other (multiple sclerosis) Mother     Other (Hemochromatosis) Father     Asthma Brother     Colon Cancer Maternal Grandmother     Other (CABG) Maternal Grandfather     Dementia Maternal Grandfather     Other (Coronary  artery disease) Maternal Grandfather     Colon Cancer Paternal Grandmother 70    Blood Cancer Paternal Grandfather 70        Lymphoma?      Social History     Socioeconomic History    Marital status:    Tobacco Use    Smoking status: Never    Smokeless tobacco: Never   Substance and Sexual Activity    Alcohol use: Yes     Alcohol/week: 1.0 standard drink of alcohol     Types: 1 Glasses of wine per week     Comment: 1 per week occasionally    Drug use: Never    Sexual activity: Yes          PHYSICAL EXAMINATION:  /70 (BP Location: Right arm, Patient Position: Sitting, Cuff Size: adult)   Pulse 79   Ht 5' 6\" (1.676 m)   Wt 148 lb (67.1 kg)   BMI 23.89 kg/m²   Physical Exam  Constitutional:       Appearance: Normal appearance.   Abdominal:      Palpations: Abdomen is soft.      Tenderness: There is no abdominal tenderness.   Neurological:      Mental Status: She is alert.   Psychiatric:         Mood and Affect: Mood normal.         Behavior: Behavior normal.         OBSTETRIC ULTRASOUND  The patient had a fetal echocardiogram today which revealed normal fetal cardiac anatomy.  FETAL ECHOCARDIOGRAM:    Single IUP in breech presentation.    Placenta is posterior.   A 3 vessel cord is noted.  Cardiac activity is present at 137 bpm  MVP is 5.1 cm .      A transabdominal 2D Doppler, fetal echocardiogram is performed. There is a four-chamber heart of appropriate cardiac dimensions. There is situs solitus with levocardia. Intracardiac connection appear to be normal.  The  atrioventricular valves appear normal. There is no evidence of pericardial or pleural effusion. The A-V conduction is one to one and the heart rate is appropriate for gestational age. No evidence of fetal arrhythmias is seen during today's study. There is a right to left shunting across the patent laci ovale. There is a normal appearance of the aorta and branching pattern of the head vessels.    Normal fetal Doppler interrogations    There  appears to be a structurally  normal fetal heart and rhythm. The patient was made aware of the limitations of the fetal heart study: malformations such as but not limiting to minor valve , VSD, anomalous pulmonary venus return, or coarctation of the aorta maybe missed. I discussed the results with the patient.        See PACS/Imaging Tab For Complete Ultrasound Report  I interpreted the results and reviewed them with the patient.    DISCUSSION  During her visit we discussed and reviewed the following issues:  ADVANCED MATERNAL AGE    Background  I reviewed with the patient that pregnancies in women of advanced maternal age (35 or older at delivery) are associated with elevated risks. Specifically, there is a higher rate of:  Fetal malformations  Preeclampsia  Gestational diabetes  Intrauterine fetal death   Please see previous [facility] detailed discussion.     DIABETES MELLITUS  Diabnosed age 6.  One episode on DKA possible.      She is on a pump.  Managed by Endo.  Diabetes type diagnosed at age 6.  Only has had 1 episode potentially of DKA.  She is overall easily controlled.  No retinopathy.  No kidney disease from the diabetes.     We discussed the risks associated with pregestational diabetes.  There is an increased risk of congenital malformations, fetal growth disturbances, preeclampsia, spontaneous  and intrauterine fetal demise (IUFD).  Infants of diabetic mothers (IDMs) are also at increased risk for hypoglycemia and hyperbilirubinemia.   Please see previous [facility] detailed discussion. Managed by endo.    Prevention of Preeclampsia   Please see previous [facility] detailed discussion.           Despite extensive clinical and epidemiologic research, the etiology of placental abruption is heterogeneous and speculative. Risk factors for placental abruption can be classified as those associated with an acute etiology;  medical and obstetrical risk factors; and  maternal sociodemographic and behavioral risk factors.   Please see previous [facility] detailed discussion.         There are no laboratory screening tests that are predictive of an increased risk for abruption. Testing women with a history of abruption for antiphospholipid antibodies or an inherited thrombophilia is not indicated unless other factors are present that could be associated with thrombophilia.     She has type 1 diabetes and has been delivered at 35 weeks for an abruption and then was delivered more in her last pregnancy at 37 weeks for elevated blood pressure.  Therefore I recommended that she be delivered at 38 weeks this pregnancy .    Goal will be to have the pump on before and after surgery.  During surgery, it is possible pump may not be able to be worn. Dexcom cannot be worn during surgery.         Discomforts of pregnancy discussed.  We discussed using heat packs, shoes with arch support, pregnancy support belt, compression stockings, massage therapy, and physical therapy as options to manage the discomforts.    IMPRESSION:  IUP at 24w0d   AMA --NIPT low risk, declines invasive testing   Type I diabetes  History abruption at 35 weeks in Preg #1  History elevated BP and delivery 37 weeks Preg #2  Prior  x 2    RECOMMENDATIONS:  Continue care with Dr. Perales  Check glucoses 4x/day and send to Endo weekly  Monthly growth US in third trimester with BPP at or beyond 32 weeks  Weekly NST at    32 wks / twice weekly at  34  wks      Hgb A1C q trimester  Baseline urine P/C ratio , CMP or AST/ALT/creatinine, TSH  EKG     Ophthalmology exam      Insulin as above  Aspirin 81-120mg daily   Delivery at 38 weeks          Thank you for allowing me to participate in the care of your patient.  Please do not hesitate to contact me if additional questions or concerns arise.      Randi Oswald M.D.    30 minutes spent in review of records, patient consultation, documentation and coordination of care.  The relevant clinical matter(s) are summarized above.     Note to  patient and family  The 21st Century Cures Act makes medical notes available to patients in the interest of transparency.  However, please be advised that this is a medical document.  It is intended as [id number] communication.  It is written and medical language may contain abbreviations or verbiage that are technical and unfamiliar.  It may appear blunt or direct.  Medical documents are intended to carry relevant information, facts as evident, and the clinical opinion of the practitioner.